# Patient Record
Sex: FEMALE | Race: WHITE | NOT HISPANIC OR LATINO | Employment: PART TIME | URBAN - NONMETROPOLITAN AREA
[De-identification: names, ages, dates, MRNs, and addresses within clinical notes are randomized per-mention and may not be internally consistent; named-entity substitution may affect disease eponyms.]

---

## 2018-10-25 ENCOUNTER — OFFICE VISIT (OUTPATIENT)
Dept: ENDOCRINOLOGY | Facility: CLINIC | Age: 48
End: 2018-10-25

## 2018-10-25 VITALS
WEIGHT: 265.1 LBS | BODY MASS INDEX: 41.61 KG/M2 | SYSTOLIC BLOOD PRESSURE: 124 MMHG | OXYGEN SATURATION: 97 % | DIASTOLIC BLOOD PRESSURE: 78 MMHG | HEIGHT: 67 IN | HEART RATE: 86 BPM

## 2018-10-25 DIAGNOSIS — E11.649 TYPE 2 DIABETES MELLITUS WITH HYPOGLYCEMIA WITHOUT COMA, WITH LONG-TERM CURRENT USE OF INSULIN (HCC): Primary | ICD-10-CM

## 2018-10-25 DIAGNOSIS — Z79.4 TYPE 2 DIABETES MELLITUS WITH HYPOGLYCEMIA WITHOUT COMA, WITH LONG-TERM CURRENT USE OF INSULIN (HCC): Primary | ICD-10-CM

## 2018-10-25 PROBLEM — E11.65 TYPE 2 DIABETES MELLITUS WITH HYPERGLYCEMIA, WITH LONG-TERM CURRENT USE OF INSULIN: Status: ACTIVE | Noted: 2018-10-25

## 2018-10-25 PROCEDURE — 99204 OFFICE O/P NEW MOD 45 MIN: CPT | Performed by: INTERNAL MEDICINE

## 2018-10-25 RX ORDER — FLASH GLUCOSE SENSOR
1 KIT MISCELLANEOUS AS NEEDED
Qty: 2 EACH | Refills: 11 | Status: SHIPPED | OUTPATIENT
Start: 2018-10-25 | End: 2018-10-29 | Stop reason: SDUPTHER

## 2018-10-25 RX ORDER — OXYCODONE AND ACETAMINOPHEN 10; 325 MG/1; MG/1
1 TABLET ORAL EVERY 8 HOURS PRN
Status: ON HOLD | COMMUNITY
End: 2022-08-20 | Stop reason: SDUPTHER

## 2018-10-25 RX ORDER — ALPRAZOLAM 1 MG/1
1 TABLET ORAL 2 TIMES DAILY PRN
COMMUNITY

## 2018-10-25 RX ORDER — FLASH GLUCOSE SENSOR
1 KIT MISCELLANEOUS ONCE
Qty: 1 DEVICE | Refills: 1 | Status: SHIPPED | OUTPATIENT
Start: 2018-10-25 | End: 2018-10-25

## 2018-10-25 RX ORDER — OMEPRAZOLE 40 MG/1
40 CAPSULE, DELAYED RELEASE ORAL DAILY
COMMUNITY

## 2018-10-25 RX ORDER — POLYETHYLENE GLYCOL 3350 17 G/17G
17 POWDER, FOR SOLUTION ORAL DAILY
COMMUNITY
End: 2021-09-01

## 2018-10-25 RX ORDER — BUMETANIDE 1 MG/1
1 TABLET ORAL DAILY PRN
COMMUNITY

## 2018-10-26 ENCOUNTER — TELEPHONE (OUTPATIENT)
Dept: ENDOCRINOLOGY | Facility: CLINIC | Age: 48
End: 2018-10-26

## 2018-10-26 NOTE — TELEPHONE ENCOUNTER
Lucila Julio Key: [id number] - Rx #: [id number] Need help? Call us at [phone]   Outcome   Additional Information Required   This medication is excluded from the patient's benefit. For more information, please reach out to [facility] directly at [phone]. Message from [facility]: Drug is not covered by plan   DrugToujeo Max SoloStar 300UNIT/ML SC SOPN   [employer] Electronic PA Form   Original Claim [id number] Prior Auth In Progress - Check Fax

## 2018-10-29 RX ORDER — FLASH GLUCOSE SENSOR
1 KIT MISCELLANEOUS AS NEEDED
Qty: 2 EACH | Refills: 11 | Status: SHIPPED | OUTPATIENT
Start: 2018-10-29 | End: 2018-11-06

## 2018-11-02 ENCOUNTER — TELEPHONE (OUTPATIENT)
Dept: ENDOCRINOLOGY | Facility: CLINIC | Age: 48
End: 2018-11-02

## 2018-11-02 NOTE — TELEPHONE ENCOUNTER
Pt states that you told her that if she developed a UTI that you would send in an antibiotic if you would send that to [facility] in [place].    Also, she said that you had ordered her a continuous monitor for her BS.  The pharmacy sent it back because the code on it was wrong and they have never received it back.  Could you fix that for her as well?  Thank you.

## 2018-11-02 NOTE — TELEPHONE ENCOUNTER
Pt states that you told her that if she developed a UTI that you would send in an antibiotic if you would send that to [facility] in [place].     Also, she said that you had ordered her a continuous monitor for her BS.  The pharmacy sent it back because the code on it was wrong and they have never received it back.  Could you fix that for her as well?  Thank you.    Above is message from front.    I have not seen anything about the meter.yet  Thanks, Estrella

## 2018-11-05 ENCOUNTER — TELEPHONE (OUTPATIENT)
Dept: ENDOCRINOLOGY | Facility: CLINIC | Age: 48
End: 2018-11-05

## 2018-11-05 RX ORDER — FLUCONAZOLE 150 MG/1
TABLET ORAL
Qty: 2 TABLET | Refills: 6 | Status: SHIPPED | OUTPATIENT
Start: 2018-11-05 | End: 2019-07-10

## 2018-11-05 RX ORDER — NITROFURANTOIN 25; 75 MG/1; MG/1
100 CAPSULE ORAL 2 TIMES DAILY
Qty: 20 CAPSULE | Refills: 0 | Status: SHIPPED | OUTPATIENT
Start: 2018-11-05 | End: 2018-12-07 | Stop reason: SDUPTHER

## 2018-11-06 ENCOUNTER — TELEPHONE (OUTPATIENT)
Dept: ENDOCRINOLOGY | Facility: CLINIC | Age: 48
End: 2018-11-06

## 2018-11-06 RX ORDER — FLASH GLUCOSE SENSOR
1 KIT MISCELLANEOUS
Qty: 2 EACH | Refills: 11 | Status: SHIPPED | OUTPATIENT
Start: 2018-11-06 | End: 2020-10-04

## 2018-11-06 RX ORDER — FLASH GLUCOSE SCANNING READER
1 EACH MISCELLANEOUS DAILY
Qty: 1 DEVICE | Refills: 1 | Status: SHIPPED | OUTPATIENT
Start: 2018-11-06 | End: 2020-10-04

## 2018-11-06 NOTE — TELEPHONE ENCOUNTER
SEFERINO Amaya left voicemail and said they did not get a diagnosis when they received a script for a Freestyle Taiwo. Please call Jose Luis GENTILE [phone]

## 2018-11-06 NOTE — TELEPHONE ENCOUNTER
SEFERINO Amaya left voicemail and said they did not get a diagnosis when they received a script for a Freestyle Taiwo. Please call Jose Luis GENTILE [phone]

## 2018-11-07 ENCOUNTER — TELEPHONE (OUTPATIENT)
Dept: ENDOCRINOLOGY | Facility: CLINIC | Age: 48
End: 2018-11-07

## 2018-11-16 ENCOUNTER — TELEPHONE (OUTPATIENT)
Dept: ENDOCRINOLOGY | Facility: CLINIC | Age: 48
End: 2018-11-16

## 2018-12-07 ENCOUNTER — OFFICE VISIT (OUTPATIENT)
Dept: ENDOCRINOLOGY | Facility: CLINIC | Age: 48
End: 2018-12-07

## 2018-12-07 VITALS
HEIGHT: 67 IN | BODY MASS INDEX: 40.97 KG/M2 | HEART RATE: 88 BPM | WEIGHT: 261 LBS | SYSTOLIC BLOOD PRESSURE: 142 MMHG | DIASTOLIC BLOOD PRESSURE: 80 MMHG

## 2018-12-07 DIAGNOSIS — I10 ESSENTIAL HYPERTENSION: ICD-10-CM

## 2018-12-07 DIAGNOSIS — Z79.4 TYPE 2 DIABETES MELLITUS WITH HYPERGLYCEMIA, WITH LONG-TERM CURRENT USE OF INSULIN (HCC): Primary | ICD-10-CM

## 2018-12-07 DIAGNOSIS — E66.01 CLASS 3 SEVERE OBESITY WITH BODY MASS INDEX (BMI) OF 40.0 TO 44.9 IN ADULT, UNSPECIFIED OBESITY TYPE, UNSPECIFIED WHETHER SERIOUS COMORBIDITY PRESENT (HCC): ICD-10-CM

## 2018-12-07 DIAGNOSIS — E11.65 TYPE 2 DIABETES MELLITUS WITH HYPERGLYCEMIA, WITH LONG-TERM CURRENT USE OF INSULIN (HCC): Primary | ICD-10-CM

## 2018-12-07 PROBLEM — E66.813 CLASS 3 SEVERE OBESITY WITH BODY MASS INDEX (BMI) OF 40.0 TO 44.9 IN ADULT: Status: ACTIVE | Noted: 2018-12-07

## 2018-12-07 PROCEDURE — 99214 OFFICE O/P EST MOD 30 MIN: CPT | Performed by: NURSE PRACTITIONER

## 2018-12-07 RX ORDER — NITROFURANTOIN 25; 75 MG/1; MG/1
100 CAPSULE ORAL 2 TIMES DAILY
Qty: 20 CAPSULE | Refills: 0 | Status: SHIPPED | OUTPATIENT
Start: 2018-12-07 | End: 2020-06-25

## 2018-12-07 RX ORDER — FLUCONAZOLE 150 MG/1
150 TABLET ORAL ONCE
Qty: 1 TABLET | Refills: 2 | Status: SHIPPED | OUTPATIENT
Start: 2018-12-07 | End: 2018-12-07

## 2018-12-07 NOTE — PROGRESS NOTES
Subjective    Lucila Kim is a 48 y.o. female. she is here today for follow-up.    History of Present Illness          Primary Care Provider     Tricia Stokes,      Duration since 2010      Timing - Diabetes is Constant     Quality -  needs improvement     Severity -  severe     Complications - retinopathy     Current symptoms/problems  none      Alleviating Factors: Compliance    July 2018 , had Lap Band Surgery      Aggravating Factors :  None      Side Effects  none     Current diet  Admits to high carb intake      Current exercise walking     Current monitoring regimen: home blood tests - checking 4 x daily       Home blood sugar records:     60 up to 179      Hypoglycemia unawareness, nocturnal and if skipped meals           The following portions of the patient's history were reviewed and updated as appropriate:   Past Medical History:   Diagnosis Date   • Type 2 diabetes mellitus with hyperglycemia, with long-term current use of insulin (CMS/[facility]) 10/25/2018     History reviewed. No pertinent surgical history.  Family History   Problem Relation Age of Onset   • Diabetes Mother      OB History     No data available        Current Outpatient Medications   Medication Sig Dispense Refill   • ALPRAZolam (XANAX) 1 MG tablet Take 1 mg by mouth 2 (Two) Times a Day As Needed for Anxiety.     • bumetanide (BUMEX) 1 MG tablet Take 1 mg by mouth Daily.     • Continuous Blood Gluc  (FREESTYLE AARON 14 DAY READER) device 1 each Daily. Use as indicated, Dx is E11.9 1 Device 1   • Continuous Blood Gluc Sensor (FREESTYLE AARON 14 DAY SENSOR) misc 1 each Every 14 (Fourteen) Days. Dx is E11.9 2 each 11   • Dulaglutide 0.75 MG/0.5ML solution pen-injector Inject 0.75 mg under the skin into the appropriate area as directed 1 (One) Time Per Week. 4 pen 11   • Empagliflozin-Metformin HCl ER (SYNJARDY XR) 5-1000 MG tablet sustained-release 24 hour Take 2 tablet/day by mouth Daily With Breakfast. 180 tablet  11   • fluconazole (DIFLUCAN) 150 MG tablet Take 1 tablet day of infection and 1 tablet 3 days later 2 tablet 6   • fluconazole (DIFLUCAN) 150 MG tablet Take 1 tablet by mouth 1 (One) Time for 1 dose. 1 tablet 2   • Insulin Glargine (TOUJEO MAX SOLOSTAR) 300 UNIT/ML solution pen-injector Inject 100 Units under the skin into the appropriate area as directed Daily. Start with 20 units qhs but may titrate up to 100 units qhs 15 pen 11   • Insulin Regular Human (AFREZZA) 4 & 8 & 12 units powder Inhale 4-32 Units 3 (Three) Times a Day With Meals. Max dose 96 units per day 360 each 11   • nitrofurantoin, macrocrystal-monohydrate, (MACROBID) 100 MG capsule Take 1 capsule by mouth 2 (Two) Times a Day. 20 capsule 0   • omeprazole (priLOSEC) 40 MG capsule Take 40 mg by mouth Daily.     • oxyCODONE-acetaminophen (PERCOCET)  MG per tablet Take 1 tablet by mouth Every 8 (Eight) Hours As Needed for Moderate Pain .     • polyethylene glycol (MIRALAX) packet Take 17 g by mouth Daily.       No current facility-administered medications for this visit.      Allergies   Allergen Reactions   • Ultram [Tramadol Hcl] Shortness Of Breath     Social History     Socioeconomic History   • Marital status: Unknown     Spouse name: Not on file   • Number of children: Not on file   • Years of education: Not on file   • Highest education level: Not on file   Tobacco Use   • Smoking status: Never Smoker   • Smokeless tobacco: Never Used   Substance and Sexual Activity   • Alcohol use: No   • Drug use: No   • Sexual activity: Defer       Review of Systems  Review of Systems   Constitutional: Negative for activity change, appetite change, diaphoresis and fatigue.   HENT: Negative for facial swelling, sneezing, sore throat, tinnitus, trouble swallowing and voice change.    Eyes: Negative for photophobia, pain, discharge, redness, itching and visual disturbance.   Respiratory: Negative for apnea, cough, choking, chest tightness and shortness of  "breath.    Cardiovascular: Negative for chest pain, palpitations and leg swelling.   Gastrointestinal: Negative for abdominal distention, abdominal pain, constipation, diarrhea, nausea and vomiting.   Endocrine: Negative for cold intolerance, heat intolerance, polydipsia, polyphagia and polyuria.   Genitourinary: Negative for difficulty urinating, dysuria, frequency, hematuria and urgency.   Musculoskeletal: Negative for arthralgias, back pain, gait problem, joint swelling, myalgias, neck pain and neck stiffness.   Skin: Negative for color change, pallor, rash and wound.   Neurological: Negative for dizziness, tremors, weakness, light-headedness, numbness and headaches.   Hematological: Negative for adenopathy. Does not bruise/bleed easily.   Psychiatric/Behavioral: Negative for behavioral problems, confusion and sleep disturbance.        Objective    /80 (BP Location: Left arm, Patient Position: Sitting, Cuff Size: Adult)   Pulse 88   Ht 170.2 cm (67\")   Wt 118 kg (261 lb)   BMI 40.88 kg/m²   Physical Exam   Constitutional: She is oriented to person, place, and time. She appears well-developed and well-nourished. No distress.   HENT:   Head: Normocephalic and atraumatic.   Right Ear: External ear normal.   Left Ear: External ear normal.   Nose: Nose normal.   Eyes: Conjunctivae and EOM are normal. Pupils are equal, round, and reactive to light.   Neck: Normal range of motion. Neck supple. No tracheal deviation present. No thyromegaly present.   Cardiovascular: Normal rate, regular rhythm and normal heart sounds.   No murmur heard.  Pulmonary/Chest: Effort normal and breath sounds normal. No respiratory distress. She has no wheezes.   Abdominal: Soft. Bowel sounds are normal. There is no tenderness. There is no rebound and no guarding.   Musculoskeletal: Normal range of motion. She exhibits no edema, tenderness or deformity.   Neurological: She is alert and oriented to person, place, and time. No cranial " nerve deficit.   Skin: Skin is warm and dry. No rash noted.   Psychiatric: She has a normal mood and affect. Her behavior is normal. Judgment and thought content normal.       Lab Review  No results found for: GLUCOSE, NA, K, CL, CO2, BUN, CREATININE, HGBA1C, TRIG, LDL    Assessment/Plan      1. Type 2 diabetes mellitus with hyperglycemia, with long-term current use of insulin (CMS/[facility])    2. Essential hypertension    3. Class 3 severe obesity with body mass index (BMI) of 40.0 to 44.9 in adult, unspecified obesity type, unspecified whether serious comorbidity present (CMS/[facility])    .    Medications prescribed:  Outpatient Encounter Medications as of 12/7/2018   Medication Sig Dispense Refill   • ALPRAZolam (XANAX) 1 MG tablet Take 1 mg by mouth 2 (Two) Times a Day As Needed for Anxiety.     • bumetanide (BUMEX) 1 MG tablet Take 1 mg by mouth Daily.     • Continuous Blood Gluc  (FREESTYLE AARON 14 DAY READER) device 1 each Daily. Use as indicated, Dx is E11.9 1 Device 1   • Continuous Blood Gluc Sensor (FREESTYLE AARON 14 DAY SENSOR) misc 1 each Every 14 (Fourteen) Days. Dx is E11.9 2 each 11   • Dulaglutide 0.75 MG/0.5ML solution pen-injector Inject 0.75 mg under the skin into the appropriate area as directed 1 (One) Time Per Week. 4 pen 11   • Empagliflozin-Metformin HCl ER (SYNJARDY XR) 5-1000 MG tablet sustained-release 24 hour Take 2 tablet/day by mouth Daily With Breakfast. 180 tablet 11   • fluconazole (DIFLUCAN) 150 MG tablet Take 1 tablet day of infection and 1 tablet 3 days later 2 tablet 6   • fluconazole (DIFLUCAN) 150 MG tablet Take 1 tablet by mouth 1 (One) Time for 1 dose. 1 tablet 2   • Insulin Glargine (TOUJEO MAX SOLOSTAR) 300 UNIT/ML solution pen-injector Inject 100 Units under the skin into the appropriate area as directed Daily. Start with 20 units qhs but may titrate up to 100 units qhs 15 pen 11   • Insulin Regular Human (AFREZZA) 4 & 8 & 12 units powder Inhale 4-32 Units 3 (Three) Times  a Day With Meals. Max dose 96 units per day 360 each 11   • nitrofurantoin, macrocrystal-monohydrate, (MACROBID) 100 MG capsule Take 1 capsule by mouth 2 (Two) Times a Day. 20 capsule 0   • omeprazole (priLOSEC) 40 MG capsule Take 40 mg by mouth Daily.     • oxyCODONE-acetaminophen (PERCOCET)  MG per tablet Take 1 tablet by mouth Every 8 (Eight) Hours As Needed for Moderate Pain .     • polyethylene glycol (MIRALAX) packet Take 17 g by mouth Daily.     • [DISCONTINUED] nitrofurantoin, macrocrystal-monohydrate, (MACROBID) 100 MG capsule Take 1 capsule by mouth 2 (Two) Times a Day. 20 capsule 0     No facility-administered encounter medications on file as of 12/7/2018.        Orders placed during this encounter include:  No orders of the defined types were placed in this encounter.    Patient has type 2 diabetes with hyperglycemia and hypoglycemia     Glycemic Management:     On mixed insulin 70/30 --- 80 bid --stopped       Toujeo 75 units once daily --- taking 100 units --- decrease to 90 units      To assess adequacy your sugar should hold steady ( within 40 points ) when not eating     Example , you go to bed 130 -- you wake up 90 to 170      If you drop more than 40 points while not eating - back off by 5 units  If you rise by 40 points while not eating for 3 days - increase by 3 units        ===========================================        Stop metformin       synjardy xr 5/1000, 2 tabs w bkfast--- stop due to chronic yeast infection     Go back to the plain metformin               ===========================================     Trulicity 0.75 mg weekly      If nausea try to eat less     If vomiting, please stop      6% chance of vomiting         ===========================================          Afrezza      Inhale 4 units for every 15 grams of carbohydrate that you eat     Ideally eat up to 45 to 50 grams of carbs per meal     The goal is to not rise more than 50 points , 2hours post meals     If the  2 hour reading is more than 180 --- you can inhale another 4 units of afrezza to bring it down         Since the premeal goal is 80 to 130 this is the ideal scenario     Before the meal 130, eat 45 grams - inhale 12 units, 2 hours post you are 180 , before the next meal down to 130 --------overnight toujeo keeps at 130 -- you wake up 130         Patient checks blood glucose levels 4 times daily and has been for the last 90 days         Approve for [employer] Personal Use        #1  Patient has diabetes mellitus, insulin-dependent.     #2 She performs blood glucose testing 4 times daily and blood glucose log was brought to office with variability from .     #3  She is requiring  Basal insulin  and Prandial Insulin 3 times daily for a total of 4 injections per day.     #4 Based on blood glucose readings we are making adjustments.      #5 I have personally seen patient within the past 6 months     #6 We plan on seeing her every 2-3 months for continuous adjustment of her diabetes regimen      #7 patient has hypoglycemia with unawareness.     #8 patient has day-to-day variation in her mealtime which confounds the degree of insulin dosing with multiple daily injections.     #9 patient has completed diabetes education program with us.     #10 she has demonstrated the ability to self monitor her glucose.         #11 Patient is motivated in improving  diabetes control               Lipid Management       No statin   Blood Pressure Management:         No bp meds           Microvascular Complication Monitoring:       Eye Exam Evaluation, within 1 year     -----------     Last Microalbumin-Proteinuria Assessment     No results found for: MALBCRERATIO     No results found for: UTPCR     -----------        Neuropathy, none                Weight Related:       Patient's Body mass index is 40.88 kg/m². BMI is above normal parameters. Recommendations include: educational material.          Diet interventions: moderate (500 kCal/d)  deficit diet.        Bone Health     No results found for: PTH, CALCIUM, CAION, PHOS, [id number]     Thyroid Health     No results found for: TSH                 Other Diabetes Related Aspects         No results found for: [id number]              4. Follow-up: Return in about 3 months (around 3/7/2019) for Recheck.

## 2019-03-13 ENCOUNTER — OFFICE VISIT (OUTPATIENT)
Dept: ENDOCRINOLOGY | Facility: CLINIC | Age: 49
End: 2019-03-13

## 2019-03-13 VITALS
SYSTOLIC BLOOD PRESSURE: 126 MMHG | BODY MASS INDEX: 43.16 KG/M2 | DIASTOLIC BLOOD PRESSURE: 80 MMHG | HEIGHT: 67 IN | HEART RATE: 96 BPM | WEIGHT: 275 LBS

## 2019-03-13 DIAGNOSIS — E11.65 TYPE 2 DIABETES MELLITUS WITH HYPERGLYCEMIA, WITH LONG-TERM CURRENT USE OF INSULIN (HCC): Primary | ICD-10-CM

## 2019-03-13 DIAGNOSIS — E27.5 HYPERADRENALISM (HCC): ICD-10-CM

## 2019-03-13 DIAGNOSIS — Z79.4 TYPE 2 DIABETES MELLITUS WITH HYPERGLYCEMIA, WITH LONG-TERM CURRENT USE OF INSULIN (HCC): Primary | ICD-10-CM

## 2019-03-13 DIAGNOSIS — M79.10 MYALGIA, UNSPECIFIED SITE: ICD-10-CM

## 2019-03-13 LAB — HBA1C MFR BLD: 8.3 %

## 2019-03-13 PROCEDURE — 99214 OFFICE O/P EST MOD 30 MIN: CPT | Performed by: NURSE PRACTITIONER

## 2019-03-13 RX ORDER — DEXAMETHASONE 1 MG
1 TABLET ORAL NIGHTLY
Qty: 1 TABLET | Refills: 0 | Status: SHIPPED | OUTPATIENT
Start: 2019-03-13 | End: 2019-07-10

## 2019-03-13 NOTE — PROGRESS NOTES
Subjective    Dulcemadi Kim is a 48 y.o. female. she is here today for follow-up.    History of Present Illness        Primary Care Provider     Tricia Stokes,      Duration since 2010      Timing - Diabetes is Constant     Quality -  needs improvement     Severity -  severe     Complications - retinopathy     Current symptoms/problems  none      Alleviating Factors: Compliance       July 2018 , had Lap Band Surgery      Aggravating Factors :  None      Side Effects  none     Current diet  Admits to high carb intake      Current exercise walking     Current monitoring regimen: home blood tests - checking 4 x daily     Lab Results   Component Value Date    HGBA1C 8.3 02/25/2019             Home blood sugar records:     [employer] downloaded and reviewed     Average 218          No lows      Hypoglycemia unawareness, nocturnal and if skipped meals               The following portions of the patient's history were reviewed and updated as appropriate:   Past Medical History:   Diagnosis Date   • Type 2 diabetes mellitus with hyperglycemia, with long-term current use of insulin (CMS/[facility]) 10/25/2018     History reviewed. No pertinent surgical history.  Family History   Problem Relation Age of Onset   • Diabetes Mother      OB History     No data available        Current Outpatient Medications   Medication Sig Dispense Refill   • ALPRAZolam (XANAX) 1 MG tablet Take 1 mg by mouth 2 (Two) Times a Day As Needed for Anxiety.     • bumetanide (BUMEX) 1 MG tablet Take 1 mg by mouth Daily.     • Continuous Blood Gluc  (FREESTYLE AARON 14 DAY READER) device 1 each Daily. Use as indicated, Dx is E11.9 1 Device 1   • Continuous Blood Gluc Sensor (FREESTYLE AARON 14 DAY SENSOR) misc 1 each Every 14 (Fourteen) Days. Dx is E11.9 2 each 11   • dexamethasone (DECADRON) 1 MG tablet Take 1 tablet by mouth Every Night. Take 1  Tablet the night before blood work 1 tablet 0   • Dulaglutide 0.75 MG/0.5ML solution  pen-injector Inject 0.75 mg under the skin into the appropriate area as directed 1 (One) Time Per Week. 4 pen 11   • Dulaglutide 1.5 MG/0.5ML solution pen-injector Inject 1.5 mg under the skin into the appropriate area as directed 1 (One) Time Per Week. 4 pen 11   • Empagliflozin-Metformin HCl ER (SYNJARDY XR) 5-1000 MG tablet sustained-release 24 hour Take 2 tablet/day by mouth Daily With Breakfast. 180 tablet 11   • fluconazole (DIFLUCAN) 150 MG tablet Take 1 tablet day of infection and 1 tablet 3 days later 2 tablet 6   • Insulin Glargine (TOUJEO MAX SOLOSTAR) 300 UNIT/ML solution pen-injector Inject 100 Units under the skin into the appropriate area as directed Daily. Start with 20 units qhs but may titrate up to 100 units qhs 15 pen 11   • Insulin Regular Human (AFREZZA) 4 & 8 & 12 units powder Inhale 4-32 Units 3 (Three) Times a Day With Meals. Max dose 96 units per day 360 each 11   • nitrofurantoin, macrocrystal-monohydrate, (MACROBID) 100 MG capsule Take 1 capsule by mouth 2 (Two) Times a Day. 20 capsule 0   • omeprazole (priLOSEC) 40 MG capsule Take 40 mg by mouth Daily.     • oxyCODONE-acetaminophen (PERCOCET)  MG per tablet Take 1 tablet by mouth Every 8 (Eight) Hours As Needed for Moderate Pain .     • polyethylene glycol (MIRALAX) packet Take 17 g by mouth Daily.       No current facility-administered medications for this visit.      Allergies   Allergen Reactions   • Ultram [Tramadol Hcl] Shortness Of Breath     Social History     Socioeconomic History   • Marital status: Unknown     Spouse name: Not on file   • Number of children: Not on file   • Years of education: Not on file   • Highest education level: Not on file   Tobacco Use   • Smoking status: Never Smoker   • Smokeless tobacco: Never Used   Substance and Sexual Activity   • Alcohol use: No   • Drug use: No   • Sexual activity: Defer       Review of Systems  Review of Systems   Constitutional: Negative for activity change, appetite  "change, diaphoresis and fatigue.   HENT: Negative for facial swelling, sneezing, sore throat, tinnitus, trouble swallowing and voice change.    Eyes: Negative for photophobia, pain, discharge, redness, itching and visual disturbance.   Respiratory: Negative for apnea, cough, choking, chest tightness and shortness of breath.    Cardiovascular: Negative for chest pain, palpitations and leg swelling.   Gastrointestinal: Negative for abdominal distention, abdominal pain, constipation, diarrhea, nausea and vomiting.   Endocrine: Negative for cold intolerance, heat intolerance, polydipsia, polyphagia and polyuria.   Genitourinary: Negative for difficulty urinating, dysuria, frequency, hematuria and urgency.   Musculoskeletal: Negative for arthralgias, back pain, gait problem, joint swelling, myalgias, neck pain and neck stiffness.   Skin: Negative for color change, pallor, rash and wound.   Neurological: Negative for dizziness, tremors, seizures, speech difficulty, weakness, light-headedness, numbness and headaches.   Hematological: Negative for adenopathy. Does not bruise/bleed easily.   Psychiatric/Behavioral: Negative for behavioral problems, confusion and sleep disturbance.        Objective    /80 (BP Location: Right arm, Patient Position: Sitting, Cuff Size: Adult)   Pulse 96   Ht 170.2 cm (67\")   Wt 125 kg (275 lb)   BMI 43.07 kg/m²   Physical Exam   Constitutional: She is oriented to person, place, and time. She appears well-developed and well-nourished. No distress.   HENT:   Head: Normocephalic and atraumatic.   Right Ear: External ear normal.   Left Ear: External ear normal.   Nose: Nose normal.   Eyes: Conjunctivae and EOM are normal. Pupils are equal, round, and reactive to light.   Neck: Normal range of motion. Neck supple. No tracheal deviation present. No thyromegaly present.   Cardiovascular: Normal rate, regular rhythm and normal heart sounds.   No murmur heard.  Pulmonary/Chest: Effort normal " and breath sounds normal. No respiratory distress. She has no wheezes.   Abdominal: Soft. Bowel sounds are normal. There is no tenderness. There is no rebound and no guarding.   Rounded abdomen   Musculoskeletal: Normal range of motion. She exhibits no edema, tenderness or deformity.   Neurological: She is alert and oriented to person, place, and time. No cranial nerve deficit.   Skin: Skin is warm and dry. No rash noted.   Striae on abdomen   Psychiatric: She has a normal mood and affect. Her behavior is normal. Judgment and thought content normal.       Lab Review  Hemoglobin A1C (no units)   Date Value   02/25/2019 8.3       Assessment/Plan      1. Type 2 diabetes mellitus with hyperglycemia, with long-term current use of insulin (CMS/[facility])    2. Hyperadrenalism (CMS/[facility])    3. Myalgia, unspecified site     .    Medications prescribed:  Outpatient Encounter Medications as of 3/13/2019   Medication Sig Dispense Refill   • ALPRAZolam (XANAX) 1 MG tablet Take 1 mg by mouth 2 (Two) Times a Day As Needed for Anxiety.     • bumetanide (BUMEX) 1 MG tablet Take 1 mg by mouth Daily.     • Continuous Blood Gluc  (FREESTYLE AARON 14 DAY READER) device 1 each Daily. Use as indicated, Dx is E11.9 1 Device 1   • Continuous Blood Gluc Sensor (FREESTYLE AARON 14 DAY SENSOR) misc 1 each Every 14 (Fourteen) Days. Dx is E11.9 2 each 11   • dexamethasone (DECADRON) 1 MG tablet Take 1 tablet by mouth Every Night. Take 1  Tablet the night before blood work 1 tablet 0   • Dulaglutide 0.75 MG/0.5ML solution pen-injector Inject 0.75 mg under the skin into the appropriate area as directed 1 (One) Time Per Week. 4 pen 11   • Dulaglutide 1.5 MG/0.5ML solution pen-injector Inject 1.5 mg under the skin into the appropriate area as directed 1 (One) Time Per Week. 4 pen 11   • Empagliflozin-Metformin HCl ER (SYNJARDY XR) 5-1000 MG tablet sustained-release 24 hour Take 2 tablet/day by mouth Daily With Breakfast. 180 tablet 11   •  fluconazole (DIFLUCAN) 150 MG tablet Take 1 tablet day of infection and 1 tablet 3 days later 2 tablet 6   • Insulin Glargine (TOUJEO MAX SOLOSTAR) 300 UNIT/ML solution pen-injector Inject 100 Units under the skin into the appropriate area as directed Daily. Start with 20 units qhs but may titrate up to 100 units qhs 15 pen 11   • Insulin Regular Human (AFREZZA) 4 & 8 & 12 units powder Inhale 4-32 Units 3 (Three) Times a Day With Meals. Max dose 96 units per day 360 each 11   • nitrofurantoin, macrocrystal-monohydrate, (MACROBID) 100 MG capsule Take 1 capsule by mouth 2 (Two) Times a Day. 20 capsule 0   • omeprazole (priLOSEC) 40 MG capsule Take 40 mg by mouth Daily.     • oxyCODONE-acetaminophen (PERCOCET)  MG per tablet Take 1 tablet by mouth Every 8 (Eight) Hours As Needed for Moderate Pain .     • polyethylene glycol (MIRALAX) packet Take 17 g by mouth Daily.       No facility-administered encounter medications on file as of 3/13/2019.        Orders placed during this encounter include:  Orders Placed This Encounter   Procedures   • Hemoglobin A1c     This order was created through External Result Entry   • Cortisol - AM   • Dexamethasone, Serum     Measure after taking dexamethasone 1 mg at 11 p.m. The night before    • Salivary Cortisol X3, Timed - Saliva, Oral Cavity     Collect at 11 p.m. For 3 consecutive nights   And collect at 8 am for 3 consecutive days         Patient has type 2 diabetes with hyperglycemia and hypoglycemia     Glycemic Management:        Lab Results   Component Value Date    HGBA1C 8.3 02/25/2019          On mixed insulin 70/30 --- 80 bid --stopped       Toujeo 75 units once daily --- taking 100 units --- decrease to 90 units      To assess adequacy your sugar should hold steady ( within 40 points ) when not eating     Example , you go to bed 130 -- you wake up 90 to 170      If you drop more than 40 points while not eating - back off by 5 units  If you rise by 40 points while not  eating for 3 days - increase by 3 units        ===========================================        Stop metformin       synjardy xr 5/1000, 2 tabs w bkfast--- stop due to chronic yeast infection      Go back to the plain metformin               ===========================================     Trulicity 0.75 mg weekly --- increase to 1.5 mg weekly     If nausea try to eat less     If vomiting, please stop      6% chance of vomiting         ===========================================          Afrezza      Inhale 4 units for every 15 grams of carbohydrate that you eat---increase to 6 units per 15 grams of CHO     Ideally eat up to 45 to 50 grams of carbs per meal     The goal is to not rise more than 50 points , 2hours post meals     If the 2 hour reading is more than 180 --- you can inhale another 4 units of afrezza to bring it down         Since the premeal goal is 80 to 130 this is the ideal scenario     Before the meal 130, eat 45 grams - inhale 12 units, 2 hours post you are 180 , before the next meal down to 130 --------overnight toujeo keeps at 130 -- you wake up 130         Patient checks blood glucose levels 4 times daily and has been for the last 90 days                 Lipid Management        No statin   Blood Pressure Management:          No bp meds           Microvascular Complication Monitoring:       Eye Exam Evaluation, within 1 year     -----------     Last Microalbumin-Proteinuria Assessment     No results found for: MALBCRERATIO     No results found for: UTPCR     -----------        Neuropathy, none              Weight Related:         Patient's Body mass index is 43.07 kg/m². BMI is above normal parameters. Recommendations include: educational material.             Diet interventions: moderate (500 kCal/d) deficit diet.    Rule out Cushing's --- weigh gain, rounding facies, lose of muscle tone in legs     Will collect salivary time 3 at 8 am and 11 pm     Dexamethasone suppression         Bone  Health     No results found for: PTH, CALCIUM, CAION, PHOS, [id number]     Thyroid Health     No results found for: TSH                 Other Diabetes Related Aspects         No results found for: [id number]                4. Follow-up: Return in about 6 weeks (around 4/24/2019) for Recheck.

## 2019-04-12 ENCOUNTER — APPOINTMENT (OUTPATIENT)
Dept: LAB | Facility: HOSPITAL | Age: 49
End: 2019-04-12

## 2019-04-12 DIAGNOSIS — E27.5 HYPERADRENALISM (HCC): ICD-10-CM

## 2019-04-12 DIAGNOSIS — E66.01 CLASS 3 SEVERE OBESITY WITH BODY MASS INDEX (BMI) OF 40.0 TO 44.9 IN ADULT, UNSPECIFIED OBESITY TYPE, UNSPECIFIED WHETHER SERIOUS COMORBIDITY PRESENT (HCC): ICD-10-CM

## 2019-04-12 DIAGNOSIS — Z79.4 TYPE 2 DIABETES MELLITUS WITH HYPERGLYCEMIA, WITH LONG-TERM CURRENT USE OF INSULIN (HCC): Primary | ICD-10-CM

## 2019-04-12 DIAGNOSIS — I10 ESSENTIAL HYPERTENSION: ICD-10-CM

## 2019-04-12 DIAGNOSIS — E11.65 TYPE 2 DIABETES MELLITUS WITH HYPERGLYCEMIA, WITH LONG-TERM CURRENT USE OF INSULIN (HCC): Primary | ICD-10-CM

## 2019-04-12 PROCEDURE — G0480 DRUG TEST DEF 1-7 CLASSES: HCPCS | Performed by: NURSE PRACTITIONER

## 2019-04-12 PROCEDURE — 36415 COLL VENOUS BLD VENIPUNCTURE: CPT | Performed by: NURSE PRACTITIONER

## 2019-04-12 PROCEDURE — 82533 TOTAL CORTISOL: CPT | Performed by: NURSE PRACTITIONER

## 2019-04-13 LAB — CORTIS AM PEAK SERPL-MCNC: 0.7 UG/DL (ref 6.2–19.4)

## 2019-04-21 LAB — DEXAMETHASONE SERPL-MCNC: 171 NG/DL

## 2019-04-22 ENCOUNTER — TELEPHONE (OUTPATIENT)
Dept: ENDOCRINOLOGY | Facility: CLINIC | Age: 49
End: 2019-04-22

## 2019-04-22 NOTE — TELEPHONE ENCOUNTER
----- Message from IVETTE Allred sent at 4/22/2019  8:07 AM CDT -----  Please let her know the dexamethasone test had a normal supression so no excess cortisol; the salivary test is not results

## 2019-04-25 ENCOUNTER — APPOINTMENT (OUTPATIENT)
Dept: LAB | Facility: HOSPITAL | Age: 49
End: 2019-04-25

## 2019-04-25 PROCEDURE — 36415 COLL VENOUS BLD VENIPUNCTURE: CPT

## 2019-04-26 ENCOUNTER — OFFICE VISIT (OUTPATIENT)
Dept: ENDOCRINOLOGY | Facility: CLINIC | Age: 49
End: 2019-04-26

## 2019-04-26 VITALS
HEIGHT: 67 IN | BODY MASS INDEX: 42.69 KG/M2 | HEART RATE: 85 BPM | SYSTOLIC BLOOD PRESSURE: 142 MMHG | DIASTOLIC BLOOD PRESSURE: 78 MMHG | WEIGHT: 272 LBS

## 2019-04-26 DIAGNOSIS — I10 ESSENTIAL HYPERTENSION: ICD-10-CM

## 2019-04-26 DIAGNOSIS — E55.9 VITAMIN D DEFICIENCY: ICD-10-CM

## 2019-04-26 DIAGNOSIS — E11.65 TYPE 2 DIABETES MELLITUS WITH HYPERGLYCEMIA, WITH LONG-TERM CURRENT USE OF INSULIN (HCC): Primary | ICD-10-CM

## 2019-04-26 DIAGNOSIS — Z79.4 TYPE 2 DIABETES MELLITUS WITH HYPERGLYCEMIA, WITH LONG-TERM CURRENT USE OF INSULIN (HCC): Primary | ICD-10-CM

## 2019-04-26 PROCEDURE — 99214 OFFICE O/P EST MOD 30 MIN: CPT | Performed by: NURSE PRACTITIONER

## 2019-04-26 NOTE — PROGRESS NOTES
Subjective    Dulcemadi Kim is a 48 y.o. female. she is here today for follow-up.    History of Present Illness        Primary Care Provider     Tricia Stokes,      Duration since 2010      Timing - Diabetes is Constant     Quality -  needs improvement     Severity -  severe     Complications - retinopathy     Current symptoms/problems  none      Alleviating Factors: Compliance        July 2018 , had Lap Band Surgery      Aggravating Factors :  None      Side Effects  none     Current diet  Admits to high carb intake      Current exercise walking     Current monitoring regimen: home blood tests - checking 4 x daily            Lab Results   Component Value Date     HGBA1C 8.3 02/25/2019               Home blood sugar records:      Taiwo home use -- did not bring to office         this am was 104     States most goal        Hypoglycemia unawareness, nocturnal and if skipped meals               The following portions of the patient's history were reviewed and updated as appropriate:   Past Medical History:   Diagnosis Date   • Type 2 diabetes mellitus with hyperglycemia, with long-term current use of insulin (CMS/[facility]) 10/25/2018     History reviewed. No pertinent surgical history.  Family History   Problem Relation Age of Onset   • Diabetes Mother      OB History     No data available        Current Outpatient Medications   Medication Sig Dispense Refill   • ALPRAZolam (XANAX) 1 MG tablet Take 1 mg by mouth 2 (Two) Times a Day As Needed for Anxiety.     • bumetanide (BUMEX) 1 MG tablet Take 1 mg by mouth Daily.     • Continuous Blood Gluc  (FREESTYLE TAIWO 14 DAY READER) device 1 each Daily. Use as indicated, Dx is E11.9 1 Device 1   • Continuous Blood Gluc Sensor (FREESTYLE TAIWO 14 DAY SENSOR) misc 1 each Every 14 (Fourteen) Days. Dx is E11.9 2 each 11   • dexamethasone (DECADRON) 1 MG tablet Take 1 tablet by mouth Every Night. Take 1  Tablet the night before blood work 1 tablet 0   •  Dulaglutide 0.75 MG/0.5ML solution pen-injector Inject 0.75 mg under the skin into the appropriate area as directed 1 (One) Time Per Week. 4 pen 11   • Dulaglutide 1.5 MG/0.5ML solution pen-injector Inject 1.5 mg under the skin into the appropriate area as directed 1 (One) Time Per Week. 4 pen 11   • Empagliflozin-Metformin HCl ER (SYNJARDY XR) 5-1000 MG tablet sustained-release 24 hour Take 2 tablet/day by mouth Daily With Breakfast. 180 tablet 11   • fluconazole (DIFLUCAN) 150 MG tablet Take 1 tablet day of infection and 1 tablet 3 days later 2 tablet 6   • Insulin Glargine (TOUJEO MAX SOLOSTAR) 300 UNIT/ML solution pen-injector Inject 100 Units under the skin into the appropriate area as directed Daily. Start with 20 units qhs but may titrate up to 100 units qhs 15 pen 11   • Insulin Regular Human (AFREZZA) 4 & 8 & 12 units powder Inhale 4-32 Units 3 (Three) Times a Day With Meals. Max dose 96 units per day 360 each 11   • nitrofurantoin, macrocrystal-monohydrate, (MACROBID) 100 MG capsule Take 1 capsule by mouth 2 (Two) Times a Day. 20 capsule 0   • omeprazole (priLOSEC) 40 MG capsule Take 40 mg by mouth Daily.     • oxyCODONE-acetaminophen (PERCOCET)  MG per tablet Take 1 tablet by mouth Every 8 (Eight) Hours As Needed for Moderate Pain .     • polyethylene glycol (MIRALAX) packet Take 17 g by mouth Daily.       No current facility-administered medications for this visit.      Allergies   Allergen Reactions   • Ultram [Tramadol Hcl] Shortness Of Breath     Social History     Socioeconomic History   • Marital status: Single     Spouse name: Not on file   • Number of children: Not on file   • Years of education: Not on file   • Highest education level: Not on file   Tobacco Use   • Smoking status: Never Smoker   • Smokeless tobacco: Never Used   Substance and Sexual Activity   • Alcohol use: No   • Drug use: No   • Sexual activity: Defer       Review of Systems  Review of Systems   Constitutional: Negative  "for activity change, appetite change, diaphoresis and fatigue.   HENT: Negative for facial swelling, sneezing, sore throat, tinnitus, trouble swallowing and voice change.    Eyes: Negative for photophobia, pain, discharge, redness, itching and visual disturbance.   Respiratory: Negative for apnea, cough, choking, chest tightness and shortness of breath.    Cardiovascular: Negative for chest pain, palpitations and leg swelling.   Gastrointestinal: Negative for abdominal distention, abdominal pain, constipation, diarrhea, nausea and vomiting.   Endocrine: Negative for cold intolerance, heat intolerance, polydipsia, polyphagia and polyuria.   Genitourinary: Negative for difficulty urinating, dysuria, frequency, hematuria and urgency.   Musculoskeletal: Negative for arthralgias, back pain, gait problem, joint swelling, myalgias, neck pain and neck stiffness.   Skin: Negative for color change, pallor, rash and wound.   Neurological: Negative for dizziness, tremors, weakness, light-headedness, numbness and headaches.   Hematological: Negative for adenopathy. Does not bruise/bleed easily.   Psychiatric/Behavioral: Negative for behavioral problems, confusion and sleep disturbance.        Objective    /78 (BP Location: Left arm, Patient Position: Sitting, Cuff Size: Adult)   Pulse 85   Ht 170.2 cm (67\")   Wt 123 kg (272 lb)   BMI 42.60 kg/m²   Physical Exam   Constitutional: She is oriented to person, place, and time. She appears well-developed and well-nourished. No distress.   HENT:   Head: Normocephalic and atraumatic.   Right Ear: External ear normal.   Left Ear: External ear normal.   Nose: Nose normal.   Eyes: Conjunctivae and EOM are normal. Pupils are equal, round, and reactive to light.   Neck: Normal range of motion. Neck supple. No tracheal deviation present. No thyromegaly present.   Cardiovascular: Normal rate, regular rhythm and normal heart sounds.   No murmur heard.  Pulmonary/Chest: Effort normal " and breath sounds normal. No respiratory distress. She has no wheezes.   Abdominal: Soft. Bowel sounds are normal. There is no tenderness. There is no rebound and no guarding.   Musculoskeletal: Normal range of motion. She exhibits no edema, tenderness or deformity.   Neurological: She is alert and oriented to person, place, and time. No cranial nerve deficit.   Skin: Skin is warm and dry. No rash noted.   Psychiatric: She has a normal mood and affect. Her behavior is normal. Judgment and thought content normal.       Lab Review  Hemoglobin A1C (no units)   Date Value   02/25/2019 8.3       Assessment/Plan      1. Type 2 diabetes mellitus with hyperglycemia, with long-term current use of insulin (CMS/[facility])    2. Essential hypertension    3. Vitamin D deficiency    .    Medications prescribed:  Outpatient Encounter Medications as of 4/26/2019   Medication Sig Dispense Refill   • ALPRAZolam (XANAX) 1 MG tablet Take 1 mg by mouth 2 (Two) Times a Day As Needed for Anxiety.     • bumetanide (BUMEX) 1 MG tablet Take 1 mg by mouth Daily.     • Continuous Blood Gluc  (FREESTYLE AARON 14 DAY READER) device 1 each Daily. Use as indicated, Dx is E11.9 1 Device 1   • Continuous Blood Gluc Sensor (FREESTYLE AARON 14 DAY SENSOR) misc 1 each Every 14 (Fourteen) Days. Dx is E11.9 2 each 11   • dexamethasone (DECADRON) 1 MG tablet Take 1 tablet by mouth Every Night. Take 1  Tablet the night before blood work 1 tablet 0   • Dulaglutide 0.75 MG/0.5ML solution pen-injector Inject 0.75 mg under the skin into the appropriate area as directed 1 (One) Time Per Week. 4 pen 11   • Dulaglutide 1.5 MG/0.5ML solution pen-injector Inject 1.5 mg under the skin into the appropriate area as directed 1 (One) Time Per Week. 4 pen 11   • Empagliflozin-Metformin HCl ER (SYNJARDY XR) 5-1000 MG tablet sustained-release 24 hour Take 2 tablet/day by mouth Daily With Breakfast. 180 tablet 11   • fluconazole (DIFLUCAN) 150 MG tablet Take 1 tablet day  of infection and 1 tablet 3 days later 2 tablet 6   • Insulin Glargine (TOUJEO MAX SOLOSTAR) 300 UNIT/ML solution pen-injector Inject 100 Units under the skin into the appropriate area as directed Daily. Start with 20 units qhs but may titrate up to 100 units qhs 15 pen 11   • Insulin Regular Human (AFREZZA) 4 & 8 & 12 units powder Inhale 4-32 Units 3 (Three) Times a Day With Meals. Max dose 96 units per day 360 each 11   • nitrofurantoin, macrocrystal-monohydrate, (MACROBID) 100 MG capsule Take 1 capsule by mouth 2 (Two) Times a Day. 20 capsule 0   • omeprazole (priLOSEC) 40 MG capsule Take 40 mg by mouth Daily.     • oxyCODONE-acetaminophen (PERCOCET)  MG per tablet Take 1 tablet by mouth Every 8 (Eight) Hours As Needed for Moderate Pain .     • polyethylene glycol (MIRALAX) packet Take 17 g by mouth Daily.       No facility-administered encounter medications on file as of 4/26/2019.        Orders placed during this encounter include:  Orders Placed This Encounter   Procedures   • Comprehensive Metabolic Panel   • Hemoglobin A1c   • TSH   • Protein / Creatinine Ratio, Urine - Urine, Clean Catch   • Microalbumin / Creatinine Urine Ratio - Urine, Clean Catch   • Vitamin B12   • Vitamin D 25 Hydroxy   • Lipid Panel   • CBC & Differential     Order Specific Question:   Manual Differential     Answer:   No     Patient has type 2 diabetes with hyperglycemia and hypoglycemia     Glycemic Management:               Lab Results   Component Value Date     HGBA1C 8.3 02/25/2019            On mixed insulin 70/30 --- 80 bid --stopped       Toujeo taking 100 units      To assess adequacy your sugar should hold steady ( within 40 points ) when not eating     Example , you go to bed 130 -- you wake up 90 to 170      If you drop more than 40 points while not eating - back off by 5 units  If you rise by 40 points while not eating for 3 days - increase by 3 units        ===========================================        Stop  metformin       synjardy xr 5/1000, 2 tabs w bkfast--- stop due to chronic yeast infection      Go back to the plain metformin --- stopped taking               ===========================================     Trulicity  1.5 mg weekly     If nausea try to eat less     If vomiting, please stop      6% chance of vomiting         ===========================================          Afrezza      Inhale 4 units for every 15 grams of carbohydrate that you eat---increase to 6 units per 15 grams of CHO     Ideally eat up to 45 to 50 grams of carbs per meal     The goal is to not rise more than 50 points , 2hours post meals     If the 2 hour reading is more than 180 --- you can inhale another 4 units of afrezza to bring it down         Since the premeal goal is 80 to 130 this is the ideal scenario     Before the meal 130, eat 45 grams - inhale 12 units, 2 hours post you are 180 , before the next meal down to 130 --------overnight toujeo keeps at 130 -- you wake up 130         Patient checks blood glucose levels 4 times daily and has been for the last 90 days                 Lipid Management        No statin   Blood Pressure Management:          No bp meds           Microvascular Complication Monitoring:       Eye Exam Evaluation, within 1 year     -----------     Last Microalbumin-Proteinuria Assessment     No results found for: MALBCRERATIO     No results found for: UTPCR     -----------        Neuropathy, none              Weight Related:         Patient's Body mass index is 42.6 kg/m². BMI is above normal parameters. Recommendations include: educational material.                Diet interventions: moderate (500 kCal/d) deficit diet.     Rule out Cushing's --- weigh gain, rounding facies, lose of muscle tone in legs --ruled out with dex. Stim test     Will collect salivary time 3 at 8 am and 11 pm --no results      Dexamethasone suppression       Component      Latest Ref Rng & Units 4/12/2019   Cortisol - AM      6.2 - 19.4  ug/dL 0.7 (L)   Dexamethasone, Serum      ng/dL 171       Normal response to the dex. Stimulation test           Bone Health     vitamin d def.     Reassess      Thyroid Health     No results found for: TSH                 Other Diabetes Related Aspects         No results found for: [id number]                 4. Follow-up: Return for Recheck.

## 2019-05-13 ENCOUNTER — OFFICE VISIT (OUTPATIENT)
Dept: PRIMARY CARE CLINIC | Age: 49
End: 2019-05-13
Payer: MEDICARE

## 2019-05-13 VITALS
BODY MASS INDEX: 43.47 KG/M2 | OXYGEN SATURATION: 98 % | DIASTOLIC BLOOD PRESSURE: 80 MMHG | TEMPERATURE: 98.4 F | HEART RATE: 92 BPM | WEIGHT: 277 LBS | HEIGHT: 67 IN | SYSTOLIC BLOOD PRESSURE: 138 MMHG

## 2019-05-13 DIAGNOSIS — E66.01 MORBID OBESITY WITH BMI OF 40.0-44.9, ADULT (HCC): ICD-10-CM

## 2019-05-13 DIAGNOSIS — E11.42 TYPE 2 DIABETES MELLITUS WITH DIABETIC POLYNEUROPATHY, WITH LONG-TERM CURRENT USE OF INSULIN (HCC): ICD-10-CM

## 2019-05-13 DIAGNOSIS — R25.2 MUSCLE CRAMPS: ICD-10-CM

## 2019-05-13 DIAGNOSIS — Z79.4 TYPE 2 DIABETES MELLITUS WITH DIABETIC POLYNEUROPATHY, WITH LONG-TERM CURRENT USE OF INSULIN (HCC): ICD-10-CM

## 2019-05-13 DIAGNOSIS — F41.9 ANXIETY: Primary | ICD-10-CM

## 2019-05-13 DIAGNOSIS — B37.31 YEAST VAGINITIS: ICD-10-CM

## 2019-05-13 PROCEDURE — 99204 OFFICE O/P NEW MOD 45 MIN: CPT | Performed by: PEDIATRICS

## 2019-05-13 RX ORDER — PREGABALIN 75 MG/1
75 CAPSULE ORAL EVERY 12 HOURS
COMMUNITY

## 2019-05-13 RX ORDER — OXYCODONE AND ACETAMINOPHEN 10; 325 MG/1; MG/1
1 TABLET ORAL EVERY 8 HOURS PRN
COMMUNITY

## 2019-05-13 RX ORDER — PHENOL 1.4 %
10 AEROSOL, SPRAY (ML) MUCOUS MEMBRANE NIGHTLY
COMMUNITY

## 2019-05-13 RX ORDER — OMEPRAZOLE 40 MG/1
40 CAPSULE, DELAYED RELEASE ORAL DAILY
COMMUNITY

## 2019-05-13 RX ORDER — FLUCONAZOLE 150 MG/1
150 TABLET ORAL DAILY
Qty: 7 TABLET | Refills: 0 | Status: SHIPPED | OUTPATIENT
Start: 2019-05-13 | End: 2019-05-20

## 2019-05-13 RX ORDER — BUMETANIDE 1 MG/1
1 TABLET ORAL PRN
COMMUNITY

## 2019-05-13 RX ORDER — CITALOPRAM 40 MG/1
40 TABLET ORAL DAILY
Qty: 30 TABLET | Refills: 3 | Status: SHIPPED | OUTPATIENT
Start: 2019-05-13 | End: 2019-06-05 | Stop reason: SDUPTHER

## 2019-05-13 RX ORDER — ALPRAZOLAM 1 MG/1
1 TABLET ORAL NIGHTLY PRN
Qty: 60 TABLET | Refills: 0 | Status: SHIPPED | OUTPATIENT
Start: 2019-05-13 | End: 2019-08-29 | Stop reason: SDUPTHER

## 2019-05-13 RX ORDER — HYDROXYZINE HYDROCHLORIDE 25 MG/1
25 TABLET, FILM COATED ORAL EVERY 8 HOURS PRN
Qty: 60 TABLET | Refills: 3 | Status: SHIPPED | OUTPATIENT
Start: 2019-05-13 | End: 2019-06-12

## 2019-05-13 SDOH — HEALTH STABILITY: MENTAL HEALTH: HOW OFTEN DO YOU HAVE A DRINK CONTAINING ALCOHOL?: NEVER

## 2019-05-13 ASSESSMENT — ENCOUNTER SYMPTOMS
VOMITING: 0
EYE REDNESS: 0
EYE ITCHING: 0
SINUS PRESSURE: 0
RHINORRHEA: 0
EYE DISCHARGE: 0
NAUSEA: 0
WHEEZING: 0
ABDOMINAL PAIN: 0
CONSTIPATION: 0
COUGH: 0
DIARRHEA: 0
VOICE CHANGE: 0
TROUBLE SWALLOWING: 0
ALLERGIC/IMMUNOLOGIC NEGATIVE: 1
PHOTOPHOBIA: 0
EYE PAIN: 0
SORE THROAT: 0
ABDOMINAL DISTENTION: 0
BLOOD IN STOOL: 0
EYES NEGATIVE: 1
SHORTNESS OF BREATH: 0

## 2019-05-13 ASSESSMENT — PATIENT HEALTH QUESTIONNAIRE - PHQ9
SUM OF ALL RESPONSES TO PHQ QUESTIONS 1-9: 0
2. FEELING DOWN, DEPRESSED OR HOPELESS: 0
SUM OF ALL RESPONSES TO PHQ9 QUESTIONS 1 & 2: 0
1. LITTLE INTEREST OR PLEASURE IN DOING THINGS: 0
SUM OF ALL RESPONSES TO PHQ QUESTIONS 1-9: 0

## 2019-05-13 NOTE — PROGRESS NOTES
[facility], [address]  Phone [phone]   Fax [phone]      OFFICE VISIT: 2019    Aliza JARVIS Crescencio-: 1970      HPI  Reason For Visit:  Jael Fragoso is a 51 y.o. Health Maintenance    New Patient (New patient here to establish care. PCP was Dr. Carpio Offer before. She sees Dr. Merced Veloz in [facility] for her DM. ) and Discuss Medications (Wants to discuss anxiety medication- she used to take Xanax but has not had this since March)      Patient presents to establish care. She is complaining of anxiety. She is wanting something to take for her anxiety. In the past she has taken Xanax 1 mg which was helpful for her. She is not on any SSRI medication. She does see pain management and takes oxycodone 10 mg and averages 90 of these on a monthly basis. Active cumulative morphine equivalent score is 45. In addition she also takes Lyrica 75 mg twice daily    She is not sleeping well at all  She states that she does not sleep well due to her anxiety. She has had multiple other medications, but nothing else seems to work for her. She has been   Type II diabetes mellitus  Medications: Toujeo insulin 100 units daily   Trulicity 1.5 mg weekly  Symptoms: Still not very well controlled from a diabetes standpoint  Last hemoglobin A1c = 8.5      GERD:  Medication:   Omeprazole 40 mg daily  Symptoms: Excellently controlled on present medication regimen     height is 5' 7\" (1.702 m) and weight is 277 lb (125.6 kg). Her temporal temperature is 98.4 °F (36.9 °C). Her blood pressure is 138/80 and her pulse is 92. Her oxygen saturation is 98%. Body mass index is 43.38 kg/m². I have reviewed the following with the Ms. Crescencio   Lab Review  No visits with results within 6 Month(s) from this visit. Latest known visit with results is:   No results found for any previous visit. Copies of these are in the chart.     Current Outpatient Medications Medication Sig Dispense Refill    Dulaglutide (TRULICITY) 1.5 IO/8.9PJ SOPN Inject 1.5 mg into the skin once a week       Insulin Glargine (TOUJEO SOLOSTAR SC) Inject 100 mg into the skin daily      bumetanide (BUMEX) 1 MG tablet Take 1 mg by mouth as needed      omeprazole (PRILOSEC) 40 MG delayed release capsule Take 40 mg by mouth daily      Melatonin 10 MG TABS Take 10 mg by mouth nightly      oxyCODONE-acetaminophen (PERCOCET)  MG per tablet Take 1 tablet by mouth every 8 hours as needed for Pain (Pain management- [facility]).  pregabalin (LYRICA) 75 MG capsule Take 75 mg by mouth every 12 hours.  citalopram (CELEXA) 40 MG tablet Take 1 tablet by mouth daily 30 tablet 3    ALPRAZolam (XANAX) 1 MG tablet Take 1 tablet by mouth nightly as needed for Sleep for up to 30 days. 60 tablet 0    hydrOXYzine (ATARAX) 25 MG tablet Take 1 tablet by mouth every 8 hours as needed for Itching 60 tablet 3    fluconazole (DIFLUCAN) 150 MG tablet Take 1 tablet by mouth daily for 7 days 7 tablet 0    Semaglutide (OZEMPIC) 1 MG/DOSE SOPN Inject 1 mg into the skin once a week 3 pen 5    empagliflozin (JARDIANCE) 10 MG tablet Take 1 tablet by mouth daily 30 tablet 5     No current facility-administered medications for this visit.         Allergies: Ultram [tramadol hcl]     Past Medical History:   Diagnosis Date    Anxiety     Type 2 diabetes mellitus without complication ([facility] [address]        Family History   Problem Relation Age of Onset    Diabetes Father     Cancer Father         Lung cancer    Cancer Paternal Grandfather         unsure       Past Surgical History:   Procedure Laterality Date     SECTION  2011    GASTRIC BAND  2017    HYSTERECTOMY, TOTAL ABDOMINAL      Still has tubes    SHOULDER SURGERY Left     rotator cuff repair       Social History     Tobacco Use    Smoking status: Never Smoker    Smokeless tobacco: Never Used   Substance Use Topics    Alcohol use: Never     Frequency: Never        Review of Systems   Constitutional: Positive for fatigue. Negative for appetite change (appetite normal), chills, fever and unexpected weight change. Weight is stable   HENT: Negative for congestion, dental problem, ear pain, hearing loss, postnasal drip, rhinorrhea, sinus pressure, sore throat, tinnitus, trouble swallowing and voice change. No headache, lightheadedness or dizziness   Eyes: Negative. Negative for photophobia, pain, discharge, redness, itching and visual disturbance. No change in vision. No blurred vision. No double vision   Respiratory: Negative for cough, shortness of breath (at rest or on exertion that is new or changes) and wheezing. No history of asthma or pneumonia. No orthopnea. No pain with deep excursions   Cardiovascular: Negative for chest pain (or pressure), palpitations and leg swelling. Gastrointestinal: Negative for abdominal distention, abdominal pain, blood in stool (or melena), constipation, diarrhea, nausea and vomiting. No acid reflux   Endocrine: Negative. Negative for cold intolerance, heat intolerance, polydipsia and polyuria. Genitourinary: Negative. Negative for pelvic pain and urgency. No problems urinating. No problems with incontinence. No irregular menses. Musculoskeletal: Positive for arthralgias. Negative for neck pain (no change in range of motion). Skin: Negative. Negative for rash. Allergic/Immunologic: Negative. Neurological: Negative for dizziness, tremors, syncope, weakness, light-headedness, numbness (or tingling sensation) and headaches. Hematological: Negative for adenopathy. Does not bruise/bleed easily. Psychiatric/Behavioral: Positive for dysphoric mood. Negative for confusion (or difficulty with memory) and sleep disturbance. The patient is nervous/anxious. Physical Exam   Constitutional: She is oriented to person, place, and time.  She appears well-developed and reassess. If needed, we can increase this to 60 mg daily. She does have panic attacks now and this may require higher dose in order to control. ALPRAZolam (XANAX) 1 MG tablet  I did agree to prescribe this as going without it has made her very anxious. I did explain to her that my goal is to get her off of this medication over time. hydrOXYzine (ATARAX) 25 MG tablet  This will be provided to help facilitate anxiety relief without using Xanax and it may also help with sleep    2. Type 2 diabetes mellitus with diabetic polyneuropathy, with long-term current use of insulin (HCC) E11.42 Semaglutide (OZEMPIC) 1 MG/DOSE  This will replace Trulicity. This will provide better glycemic control and even greater weight loss relative to Trulicity. Z79.4 empagliflozin (JARDIANCE) 10 MG tablet  We will utilize this medication to facilitate weight loss as well. This will also facilitate improved glycemic control. We did discuss the potential for yeast vaginitis. We will start with Diflucan 150 mg daily ×3 days and then she will take Diflucan 150 mg weekly thereafter for the 1st month. Once blood sugars are better controlled, we may not have as increased risk of mycotic infections      Comprehensive Metabolic Panel     Hemoglobin A1C   3. Yeast vaginitis B37.3 fluconazole (DIFLUCAN) 150 MG tablet  Use as indicated above    4. Muscle cramps R25.2 Comprehensive Metabolic Panel  She is having some muscle cramps. We did recommend potentially using Gatorade like sports drinks with little or no calories to supplement and potentially prevent muscle cramps. We also discussed that she may not need her diuretic with Jardiance onboard. Orders Placed This Encounter   Procedures    Comprehensive Metabolic Panel    Hemoglobin A1C        Return in about 1 month (around 6/13/2019).

## 2019-06-05 DIAGNOSIS — F41.9 ANXIETY: ICD-10-CM

## 2019-06-05 RX ORDER — CITALOPRAM 40 MG/1
40 TABLET ORAL DAILY
Qty: 90 TABLET | Refills: 1 | Status: SHIPPED | OUTPATIENT
Start: 2019-06-05 | End: 2019-08-29

## 2019-06-05 NOTE — TELEPHONE ENCOUNTER
Received fax from pharmacy requesting refill on pts medication(s). Pt was last seen in office on 5/13/2019  and has a follow up scheduled for 6/13/2019. Will send request to  Dr. Lawyer De La Garza  for patient.      Requested Prescriptions     Pending Prescriptions Disp Refills    citalopram (CELEXA) 40 MG tablet 90 tablet 1     Sig: Take 1 tablet by mouth daily

## 2019-07-08 ENCOUNTER — TELEPHONE (OUTPATIENT)
Dept: ADMINISTRATIVE | Age: 49
End: 2019-07-08

## 2019-07-09 ENCOUNTER — TELEPHONE (OUTPATIENT)
Dept: PRIMARY CARE CLINIC | Age: 49
End: 2019-07-09

## 2019-07-09 DIAGNOSIS — Z79.4 TYPE 2 DIABETES MELLITUS WITH DIABETIC POLYNEUROPATHY, WITH LONG-TERM CURRENT USE OF INSULIN (HCC): ICD-10-CM

## 2019-07-09 DIAGNOSIS — E11.42 TYPE 2 DIABETES MELLITUS WITH DIABETIC POLYNEUROPATHY, WITH LONG-TERM CURRENT USE OF INSULIN (HCC): ICD-10-CM

## 2019-07-09 DIAGNOSIS — R25.2 MUSCLE CRAMPS: ICD-10-CM

## 2019-07-09 LAB
ALBUMIN SERPL-MCNC: 4.1 G/DL (ref 3.5–5.2)
ALP BLD-CCNC: 77 U/L (ref 35–104)
ALT SERPL-CCNC: 23 U/L (ref 5–33)
ANION GAP SERPL CALCULATED.3IONS-SCNC: 15 MMOL/L (ref 7–19)
AST SERPL-CCNC: 20 U/L (ref 5–32)
BILIRUB SERPL-MCNC: 0.4 MG/DL (ref 0.2–1.2)
BUN BLDV-MCNC: 9 MG/DL (ref 6–20)
CALCIUM SERPL-MCNC: 8.7 MG/DL (ref 8.6–10)
CHLORIDE BLD-SCNC: 99 MMOL/L (ref 98–111)
CO2: 27 MMOL/L (ref 22–29)
CREAT SERPL-MCNC: 0.7 MG/DL (ref 0.5–0.9)
GFR NON-AFRICAN AMERICAN: >60
GLUCOSE BLD-MCNC: 216 MG/DL (ref 74–109)
HBA1C MFR BLD: 9.9 % (ref 4–6)
POTASSIUM SERPL-SCNC: 3.7 MMOL/L (ref 3.5–5)
SODIUM BLD-SCNC: 141 MMOL/L (ref 136–145)
TOTAL PROTEIN: 7.4 G/DL (ref 6.6–8.7)

## 2019-07-09 NOTE — TELEPHONE ENCOUNTER
----- Message from [address] [facility],[address], DO sent at 7/9/2019  3:30 PM CDT -----  Hemoglobin A1c is 9.9 which indicates poor blood sugar control on present medication regimen.   We are going to need to adjust her medications to maintain better control blood sugars

## 2019-07-09 NOTE — TELEPHONE ENCOUNTER
Pt aware and voiced understanding. Informed patient of any recommendations from providers. Will call with any further questions. Patient sees dr Yuliana Ash tomorrow.

## 2019-07-09 NOTE — TELEPHONE ENCOUNTER
----- Message from [address] [facility],[address], DO sent at 7/9/2019  2:47 PM CDT -----  Your metabolic profile is normal.  This includes kidney and liver functions as well as electrolytes. Blood sugar was 216 at the time of the lab draw.   We are still waiting on the hemoglobin A1c

## 2019-07-10 ENCOUNTER — OFFICE VISIT (OUTPATIENT)
Dept: ENDOCRINOLOGY | Facility: CLINIC | Age: 49
End: 2019-07-10

## 2019-07-10 VITALS
BODY MASS INDEX: 43.7 KG/M2 | WEIGHT: 278.4 LBS | OXYGEN SATURATION: 97 % | HEIGHT: 67 IN | HEART RATE: 84 BPM | DIASTOLIC BLOOD PRESSURE: 76 MMHG | SYSTOLIC BLOOD PRESSURE: 136 MMHG

## 2019-07-10 DIAGNOSIS — E55.9 VITAMIN D DEFICIENCY: ICD-10-CM

## 2019-07-10 DIAGNOSIS — Z79.4 TYPE 2 DIABETES MELLITUS WITH HYPERGLYCEMIA, WITH LONG-TERM CURRENT USE OF INSULIN (HCC): Primary | ICD-10-CM

## 2019-07-10 DIAGNOSIS — E66.01 CLASS 3 SEVERE OBESITY WITH BODY MASS INDEX (BMI) OF 40.0 TO 44.9 IN ADULT, UNSPECIFIED OBESITY TYPE, UNSPECIFIED WHETHER SERIOUS COMORBIDITY PRESENT (HCC): ICD-10-CM

## 2019-07-10 DIAGNOSIS — E27.5 HYPERADRENALISM (HCC): ICD-10-CM

## 2019-07-10 DIAGNOSIS — I10 ESSENTIAL HYPERTENSION: ICD-10-CM

## 2019-07-10 DIAGNOSIS — E11.65 TYPE 2 DIABETES MELLITUS WITH HYPERGLYCEMIA, WITH LONG-TERM CURRENT USE OF INSULIN (HCC): Primary | ICD-10-CM

## 2019-07-10 PROCEDURE — 99214 OFFICE O/P EST MOD 30 MIN: CPT | Performed by: INTERNAL MEDICINE

## 2019-07-10 RX ORDER — FLUCONAZOLE 150 MG/1
TABLET ORAL
Qty: 4 TABLET | Refills: 11 | Status: SHIPPED | OUTPATIENT
Start: 2019-07-10 | End: 2020-10-04 | Stop reason: SDUPTHER

## 2019-07-10 NOTE — PROGRESS NOTES
Subjective    Dulcemadi Kim is a 49 y.o. female. she is here today for follow-up.    Diabetes   Pertinent negatives for hypoglycemia include no confusion, dizziness, headaches, pallor or tremors. Pertinent negatives for diabetes include no chest pain, no fatigue, no polydipsia, no polyphagia, no polyuria and no weakness.           Primary Care Provider     Tricia Stokes, DO     Duration since 2010      Timing - Diabetes is Constant     Quality -  needs improvement     Severity -  severe     Complications - retinopathy     Current symptoms/problems  none      Alleviating Factors: Compliance        July 2018 , had Lap Band Surgery      Aggravating Factors :  None      Side Effects  none     Current diet  Admits to high carb intake      Current exercise walking     Current monitoring regimen: home blood tests - checking 4 x daily      Lab Results   Component Value Date    HGBA1C 9.9 (H) 07/09/2019          Home blood sugar records:      Taiwo home use -- did not bring to office         this am was 104     States most goal        Hypoglycemia unawareness, nocturnal and if skipped meals               The following portions of the patient's history were reviewed and updated as appropriate:   Past Medical History:   Diagnosis Date   • Type 2 diabetes mellitus with hyperglycemia, with long-term current use of insulin (CMS/[facility]) 10/25/2018     No past surgical history on file.  Family History   Problem Relation Age of Onset   • Diabetes Mother      OB History     No data available        Current Outpatient Medications   Medication Sig Dispense Refill   • ALPRAZolam (XANAX) 1 MG tablet Take 1 mg by mouth 2 (Two) Times a Day As Needed for Anxiety.     • bumetanide (BUMEX) 1 MG tablet Take 1 mg by mouth Daily.     • Continuous Blood Gluc  (FREESTYLE TAIWO 14 DAY READER) device 1 each Daily. Use as indicated, Dx is E11.9 1 Device 1   • Continuous Blood Gluc Sensor (FREESTYLE TAIWO 14 DAY SENSOR) misc 1  each Every 14 (Fourteen) Days. Dx is E11.9 2 each 11   • Dulaglutide 1.5 MG/0.5ML solution pen-injector Inject 1.5 mg under the skin into the appropriate area as directed 1 (One) Time Per Week. 4 pen 11   • fluconazole (DIFLUCAN) 150 MG tablet Take 1 tablet day of infection and 1 tablet 3 days later 2 tablet 6   • Insulin Glargine (TOUJEO MAX SOLOSTAR) 300 UNIT/ML solution pen-injector Inject 100 Units under the skin into the appropriate area as directed Daily. Start with 20 units qhs but may titrate up to 100 units qhs 15 pen 11   • nitrofurantoin, macrocrystal-monohydrate, (MACROBID) 100 MG capsule Take 1 capsule by mouth 2 (Two) Times a Day. 20 capsule 0   • omeprazole (priLOSEC) 40 MG capsule Take 40 mg by mouth Daily.     • oxyCODONE-acetaminophen (PERCOCET)  MG per tablet Take 1 tablet by mouth Every 8 (Eight) Hours As Needed for Moderate Pain .     • polyethylene glycol (MIRALAX) packet Take 17 g by mouth Daily.     • dexamethasone (DECADRON) 1 MG tablet Take 1 tablet by mouth Every Night. Take 1  Tablet the night before blood work 1 tablet 0   • Insulin Regular Human (AFREZZA) 4 & 8 & 12 units powder Inhale 4-32 Units 3 (Three) Times a Day With Meals. Max dose 96 units per day 360 each 11     No current facility-administered medications for this visit.      Allergies   Allergen Reactions   • Ultram [Tramadol Hcl] Shortness Of Breath     Social History     Socioeconomic History   • Marital status: Single     Spouse name: Not on file   • Number of children: Not on file   • Years of education: Not on file   • Highest education level: Not on file   Tobacco Use   • Smoking status: Never Smoker   • Smokeless tobacco: Never Used   Substance and Sexual Activity   • Alcohol use: No   • Drug use: No   • Sexual activity: Defer       Review of Systems  Review of Systems   Constitutional: Negative for activity change, appetite change, diaphoresis and fatigue.   HENT: Negative for facial swelling, sneezing, sore  "throat, tinnitus, trouble swallowing and voice change.    Eyes: Negative for photophobia, pain, discharge, redness, itching and visual disturbance.   Respiratory: Negative for apnea, cough, choking, chest tightness and shortness of breath.    Cardiovascular: Negative for chest pain, palpitations and leg swelling.   Gastrointestinal: Negative for abdominal distention, abdominal pain, constipation, diarrhea, nausea and vomiting.   Endocrine: Negative for cold intolerance, heat intolerance, polydipsia, polyphagia and polyuria.   Genitourinary: Negative for difficulty urinating, dysuria, frequency, hematuria and urgency.   Musculoskeletal: Negative for arthralgias, back pain, gait problem, joint swelling, myalgias, neck pain and neck stiffness.   Skin: Negative for color change, pallor, rash and wound.   Neurological: Negative for dizziness, tremors, weakness, light-headedness, numbness and headaches.   Hematological: Negative for adenopathy. Does not bruise/bleed easily.   Psychiatric/Behavioral: Negative for behavioral problems, confusion and sleep disturbance.        Objective    /76   Pulse 84   Ht 170.2 cm (67\")   Wt 126 kg (278 lb 6.4 oz)   SpO2 97%   BMI 43.60 kg/m²   Physical Exam   Constitutional: She is oriented to person, place, and time. She appears well-developed and well-nourished. No distress.   HENT:   Head: Normocephalic and atraumatic.   Right Ear: External ear normal.   Left Ear: External ear normal.   Nose: Nose normal.   Eyes: Conjunctivae and EOM are normal. Pupils are equal, round, and reactive to light.   Neck: Normal range of motion. Neck supple. No tracheal deviation present. No thyromegaly present.   Cardiovascular: Normal rate, regular rhythm and normal heart sounds.   No murmur heard.  Pulmonary/Chest: Effort normal and breath sounds normal. No respiratory distress. She has no wheezes.   Abdominal: Soft. Bowel sounds are normal. There is no tenderness. There is no rebound and no " guarding.   Musculoskeletal: Normal range of motion. She exhibits no edema, tenderness or deformity.   Neurological: She is alert and oriented to person, place, and time. No cranial nerve deficit.   Skin: Skin is warm and dry. No rash noted.   Psychiatric: She has a normal mood and affect. Her behavior is normal. Judgment and thought content normal.       Lab Review  Glucose (mg/dL)   Date Value   07/09/2019 216 (H)     Sodium (mmol/L)   Date Value   07/09/2019 141     Potassium (mmol/L)   Date Value   07/09/2019 3.7     Chloride (mmol/L)   Date Value   07/09/2019 99     CO2 (mmol/L)   Date Value   07/09/2019 27     BUN (mg/dL)   Date Value   07/09/2019 9     Creatinine (mg/dL)   Date Value   07/09/2019 0.7     Hemoglobin A1C   Date Value   07/09/2019 9.9 % (H)   02/25/2019 8.3       Assessment/Plan      1. Type 2 diabetes mellitus with hyperglycemia, with long-term current use of insulin (CMS/[facility])    2. Essential hypertension    3. Vitamin D deficiency    4. Hyperadrenalism (CMS/[facility])    5. Class 3 severe obesity with body mass index (BMI) of 40.0 to 44.9 in adult, unspecified obesity type, unspecified whether serious comorbidity present (CMS/[facility])    .    Medications prescribed:  Outpatient Encounter Medications as of 7/10/2019   Medication Sig Dispense Refill   • ALPRAZolam (XANAX) 1 MG tablet Take 1 mg by mouth 2 (Two) Times a Day As Needed for Anxiety.     • bumetanide (BUMEX) 1 MG tablet Take 1 mg by mouth Daily.     • Continuous Blood Gluc  (FREESTYLE AARON 14 DAY READER) device 1 each Daily. Use as indicated, Dx is E11.9 1 Device 1   • Continuous Blood Gluc Sensor (FREESTYLE AARON 14 DAY SENSOR) misc 1 each Every 14 (Fourteen) Days. Dx is E11.9 2 each 11   • Dulaglutide 1.5 MG/0.5ML solution pen-injector Inject 1.5 mg under the skin into the appropriate area as directed 1 (One) Time Per Week. 4 pen 11   • fluconazole (DIFLUCAN) 150 MG tablet Take 1 tablet day of infection and 1 tablet 3 days later 2  tablet 6   • Insulin Glargine (TOUJEO MAX SOLOSTAR) 300 UNIT/ML solution pen-injector Inject 100 Units under the skin into the appropriate area as directed Daily. Start with 20 units qhs but may titrate up to 100 units qhs 15 pen 11   • nitrofurantoin, macrocrystal-monohydrate, (MACROBID) 100 MG capsule Take 1 capsule by mouth 2 (Two) Times a Day. 20 capsule 0   • omeprazole (priLOSEC) 40 MG capsule Take 40 mg by mouth Daily.     • oxyCODONE-acetaminophen (PERCOCET)  MG per tablet Take 1 tablet by mouth Every 8 (Eight) Hours As Needed for Moderate Pain .     • polyethylene glycol (MIRALAX) packet Take 17 g by mouth Daily.     • dexamethasone (DECADRON) 1 MG tablet Take 1 tablet by mouth Every Night. Take 1  Tablet the night before blood work 1 tablet 0   • Insulin Regular Human (AFREZZA) 4 & 8 & 12 units powder Inhale 4-32 Units 3 (Three) Times a Day With Meals. Max dose 96 units per day 360 each 11   • [DISCONTINUED] Dulaglutide 0.75 MG/0.5ML solution pen-injector Inject 0.75 mg under the skin into the appropriate area as directed 1 (One) Time Per Week. 4 pen 11   • [DISCONTINUED] Empagliflozin-Metformin HCl ER (SYNJARDY XR) 5-1000 MG tablet sustained-release 24 hour Take 2 tablet/day by mouth Daily With Breakfast. 180 tablet 11     No facility-administered encounter medications on file as of 7/10/2019.        Orders placed during this encounter include:  No orders of the defined types were placed in this encounter.    Patient has type 2 diabetes with hyperglycemia and hypoglycemia     Glycemic Management:               Lab Results   Component Value Date     HGBA1C 8.3 02/25/2019              Toujeo taking 100 units           ===========================================            synjardy xr 5/1000, 2 tabs w bkfast--- stop due to chronic yeast infection             ===========================================     Trulicity  1.5 mg weekly       ===========================================          Afrezza          Up to 32 units with meals needed because of lipohypertrophy     Has been using         Lipid Management      No results found for: CHOL, CHLPL, TRIG, HDL, LDL, LDLDIRECT    No statin       Blood Pressure Management:          No bp meds           Microvascular Complication Monitoring:       Eye Exam Evaluation, within 1 year     -----------     Last Microalbumin-Proteinuria Assessment     No results found for: MALBCRERATIO     No results found for: UTPCR     -----------        Neuropathy, none              Weight Related:         Patient's Body mass index is 43.6 kg/m². BMI is above normal parameters. Recommendations include: educational material.                Diet interventions: moderate (500 kCal/d) deficit diet.     Rule out Cushing's --- weigh gain, rounding facies, lose of muscle tone in legs --ruled out with dex. Stim test     Will collect salivary time 3 at 8 am and 11 pm --no results      Dexamethasone suppression       Component      Latest Ref Rng & Units 4/12/2019   Cortisol - AM      6.2 - 19.4 ug/dL 0.7 (L)   Dexamethasone, Serum      ng/dL 171       Normal response to the dex. Stimulation test           Bone Health     vitamin d def.     Reassess      Thyroid Health     No results found for: TSH                 Other Diabetes Related Aspects         No results found for: [id number]                 4. Follow-up: No Follow-up on file.

## 2019-08-29 ENCOUNTER — OFFICE VISIT (OUTPATIENT)
Dept: PRIMARY CARE CLINIC | Age: 49
End: 2019-08-29
Payer: MEDICARE

## 2019-08-29 VITALS
HEIGHT: 67 IN | SYSTOLIC BLOOD PRESSURE: 126 MMHG | DIASTOLIC BLOOD PRESSURE: 80 MMHG | TEMPERATURE: 98 F | HEART RATE: 86 BPM | BODY MASS INDEX: 41.75 KG/M2 | OXYGEN SATURATION: 98 % | WEIGHT: 266 LBS

## 2019-08-29 DIAGNOSIS — F41.9 ANXIETY: ICD-10-CM

## 2019-08-29 DIAGNOSIS — E11.42 TYPE 2 DIABETES MELLITUS WITH DIABETIC POLYNEUROPATHY, WITHOUT LONG-TERM CURRENT USE OF INSULIN (HCC): Primary | ICD-10-CM

## 2019-08-29 DIAGNOSIS — G89.4 CHRONIC PAIN SYNDROME: ICD-10-CM

## 2019-08-29 DIAGNOSIS — M62.08 DIASTASIS RECTI: ICD-10-CM

## 2019-08-29 DIAGNOSIS — G47.10 HYPERSOMNIA: ICD-10-CM

## 2019-08-29 PROCEDURE — 1036F TOBACCO NON-USER: CPT | Performed by: PEDIATRICS

## 2019-08-29 PROCEDURE — 3046F HEMOGLOBIN A1C LEVEL >9.0%: CPT | Performed by: PEDIATRICS

## 2019-08-29 PROCEDURE — 2022F DILAT RTA XM EVC RTNOPTHY: CPT | Performed by: PEDIATRICS

## 2019-08-29 PROCEDURE — G8427 DOCREV CUR MEDS BY ELIG CLIN: HCPCS | Performed by: PEDIATRICS

## 2019-08-29 PROCEDURE — 3017F COLORECTAL CA SCREEN DOC REV: CPT | Performed by: PEDIATRICS

## 2019-08-29 PROCEDURE — G8417 CALC BMI ABV UP PARAM F/U: HCPCS | Performed by: PEDIATRICS

## 2019-08-29 PROCEDURE — 99214 OFFICE O/P EST MOD 30 MIN: CPT | Performed by: PEDIATRICS

## 2019-08-29 RX ORDER — ALPRAZOLAM 1 MG/1
1 TABLET ORAL NIGHTLY PRN
Qty: 60 TABLET | Refills: 0 | Status: CANCELLED | OUTPATIENT
Start: 2019-08-29 | End: 2019-09-28

## 2019-08-29 RX ORDER — ESCITALOPRAM OXALATE 20 MG/1
20 TABLET ORAL DAILY
Qty: 30 TABLET | Refills: 3 | Status: SHIPPED | OUTPATIENT
Start: 2019-08-29 | End: 2019-09-23 | Stop reason: ALTCHOICE

## 2019-08-29 RX ORDER — ALPRAZOLAM 1 MG/1
1 TABLET ORAL NIGHTLY PRN
Qty: 60 TABLET | Refills: 0 | Status: SHIPPED | OUTPATIENT
Start: 2019-08-29 | End: 2019-09-28

## 2019-08-29 ASSESSMENT — ENCOUNTER SYMPTOMS
ALLERGIC/IMMUNOLOGIC NEGATIVE: 1
EYE PAIN: 0
ABDOMINAL PAIN: 0
EYE ITCHING: 0
SHORTNESS OF BREATH: 0
BLOOD IN STOOL: 0
ABDOMINAL DISTENTION: 0
EYE REDNESS: 0
VOICE CHANGE: 0
SINUS PRESSURE: 0
RHINORRHEA: 0
EYES NEGATIVE: 1
PHOTOPHOBIA: 0
SORE THROAT: 0
TROUBLE SWALLOWING: 0
WHEEZING: 0
DIARRHEA: 0
EYE DISCHARGE: 0
CONSTIPATION: 0
NAUSEA: 0
VOMITING: 0
COUGH: 0

## 2019-09-23 ENCOUNTER — OFFICE VISIT (OUTPATIENT)
Dept: PRIMARY CARE CLINIC | Age: 49
End: 2019-09-23
Payer: MEDICARE

## 2019-09-23 VITALS
HEIGHT: 67 IN | HEART RATE: 85 BPM | OXYGEN SATURATION: 97 % | WEIGHT: 266 LBS | DIASTOLIC BLOOD PRESSURE: 86 MMHG | SYSTOLIC BLOOD PRESSURE: 136 MMHG | BODY MASS INDEX: 41.75 KG/M2 | TEMPERATURE: 97.6 F

## 2019-09-23 DIAGNOSIS — G47.10 HYPERSOMNIA: ICD-10-CM

## 2019-09-23 DIAGNOSIS — F41.9 ANXIETY: Primary | ICD-10-CM

## 2019-09-23 PROCEDURE — 99213 OFFICE O/P EST LOW 20 MIN: CPT | Performed by: PEDIATRICS

## 2019-09-23 PROCEDURE — G8427 DOCREV CUR MEDS BY ELIG CLIN: HCPCS | Performed by: PEDIATRICS

## 2019-09-23 PROCEDURE — G8417 CALC BMI ABV UP PARAM F/U: HCPCS | Performed by: PEDIATRICS

## 2019-09-23 PROCEDURE — 3017F COLORECTAL CA SCREEN DOC REV: CPT | Performed by: PEDIATRICS

## 2019-09-23 PROCEDURE — 1036F TOBACCO NON-USER: CPT | Performed by: PEDIATRICS

## 2019-09-23 RX ORDER — CLONAZEPAM 1 MG/1
1 TABLET ORAL NIGHTLY PRN
Qty: 30 TABLET | Refills: 0 | Status: SHIPPED | OUTPATIENT
Start: 2019-09-23 | End: 2019-11-04 | Stop reason: ALTCHOICE

## 2019-09-23 RX ORDER — FLUOXETINE HYDROCHLORIDE 40 MG/1
40 CAPSULE ORAL DAILY
Qty: 30 CAPSULE | Refills: 3 | Status: SHIPPED | OUTPATIENT
Start: 2019-09-23 | End: 2019-10-25 | Stop reason: SDUPTHER

## 2019-09-23 ASSESSMENT — ENCOUNTER SYMPTOMS
EYES NEGATIVE: 1
ALLERGIC/IMMUNOLOGIC NEGATIVE: 1
GASTROINTESTINAL NEGATIVE: 1
RESPIRATORY NEGATIVE: 1

## 2019-09-23 NOTE — PROGRESS NOTES
ABDOMINAL      Still has tubes    SHOULDER SURGERY Left 1999    rotator cuff repair       Social History     Tobacco Use    Smoking status: Never Smoker    Smokeless tobacco: Never Used   Substance Use Topics    Alcohol use: Never     Frequency: Never        Review of Systems   Constitutional: Positive for fatigue. HENT: Negative. Eyes: Negative. Respiratory: Negative. Cardiovascular: Negative. Gastrointestinal: Negative. Endocrine: Negative. Genitourinary: Negative. Musculoskeletal: Positive for arthralgias. Skin: Negative. Allergic/Immunologic: Negative. Neurological: Negative. Hematological: Negative. Psychiatric/Behavioral: Positive for sleep disturbance (she feels tired all the time. ). The patient is nervous/anxious (with panic attacks). Physical Exam   Constitutional: She is oriented to person, place, and time. She appears well-developed and well-nourished. No distress. HENT:   Head: Normocephalic and atraumatic. Right Ear: External ear normal.   Left Ear: External ear normal.   Nose: Nose normal.   Mouth/Throat: Oropharynx is clear and moist.   Eyes: Conjunctivae are normal. Right eye exhibits no discharge. Left eye exhibits no discharge. Neck: Normal range of motion. Neck supple. No JVD present. No thyromegaly present. Cardiovascular: Normal rate, regular rhythm and normal heart sounds. Exam reveals no gallop and no friction rub. No murmur heard. Pulmonary/Chest: Effort normal and breath sounds normal. No respiratory distress. She has no wheezes. She has no rales. Abdominal: Soft. Bowel sounds are normal. She exhibits no mass. There is no tenderness. There is no rebound and no guarding. Abdominal obesity with diastases recti   Musculoskeletal: Normal range of motion. She exhibits no edema or tenderness. Lymphadenopathy:     She has no cervical adenopathy. Neurological: She is alert and oriented to person, place, and time.  She exhibits normal muscle tone. Skin: Skin is warm and dry. No rash noted. Nursing note and vitals reviewed. ASSESSMENT      ICD-10-CM    1. Anxiety F41.9 FLUoxetine (PROZAC) 40 MG capsule     clonazePAM (KLONOPIN) 1 MG tablet   2. Hypersomnia G47.10        PLAN      ICD-10-CM    1. Anxiety F41.9 FLUoxetine (PROZAC) 40 MG capsule     clonazePAM (KLONOPIN) 1 MG tablet   2. Hypersomnia G47.10 There may be a component of michelle, but she does not feel so. Declined sleep study for now. Will follow        No orders of the defined types were placed in this encounter. No follow-ups on file.

## 2019-09-23 NOTE — PATIENT INSTRUCTIONS
Imagine yourself in any setting that helps you feel calm. You can use audiotapes, books, or a teacher to guide you. How to relax your body  · Do something active. Exercise or activity can help reduce stress. Walking is a great way to get started. Even everyday activities such as housecleaning or yard work can help. · Do breathing exercises. For example:  ? From a standing position, bend forward from the waist with your knees slightly bent. Let your arms dangle close to the floor. ? Breathe in slowly and deeply as you return to a standing position. Roll up slowly and lift your head last.  ? Hold your breath for just a few seconds in the standing position. ? Breathe out slowly and bend forward from the waist.  · Try yoga or chad chi. These techniques combine exercise and meditation. You may need some training at first to learn them. What can you do to prevent stress? · Manage your time. This helps you find time to do the things you want and need to do. · Get enough sleep. Your body recovers from the stresses of the day while you are sleeping. · Get support. Your family, friends, and community can make a difference in how you experience stress. Where can you learn more? Go to https://Akellapepiceweb.Nistica. org and sign in to your [employer] account. Enter O757 in the [employer] box to learn more about \"Learning About Stress. \"     If you do not have an account, please click on the \"Sign Up Now\" link. Current as of: June 28, 2018  Content Version: 12.1  © 0652-8801 Healthwise, Incorporated. Care instructions adapted under license by [facility] ([facility]). If you have questions about a medical condition or this instruction, always ask your healthcare professional. [address] any warranty or liability for your use of this information. Patient Education        Learning About Guided Imagery for Stress  What are guided imagery and stress?     Stress is what you feel when you If you can, close your eyes. Put your arms on the armrests, or fold your hands in your lap. · Take a deep breath through your nose. Breathe in slowly, and then let the air out completely through your mouth. · Do it again slowly. Keep breathing like this. Gather up any tension in your body, and send it out with every breath. · Let a feeling of warmth spread from your lungs to your neck and head, down your arms to your fingertips, through your body and into your legs, all the way down to your toes. Stay this way for a moment. · Now imagine a pleasant day. You're at a park or at a place you've visited in the past where you felt at peace. · In your mind's eye, look at what lies in front of you. Maybe you see the sun, filtered through trees. Maybe clouds are drifting by. · Look to one side, and then the other. Notice the feel of the air around you. Notice how it feels on your face and on your arms. · Stay here for a while. Let it become real for you. Follow-up care is a key part of your treatment and safety. Be sure to make and go to all appointments, and call your doctor if you are having problems. It's also a good idea to know your test results and keep a list of the medicines you take. Where can you learn more? Go to https://Nereus [email or web address] org and sign in to your [employer] account. Enter N291 in the [employer] box to learn more about \"Learning About Guided Imagery for Stress. \"     If you do not have an account, please click on the \"Sign Up Now\" link. Current as of: June 28, 2018  Content Version: 12.1  © 6891-7609 Healthwise, [employer]. Care instructions adapted under license by [facility] ([facility]). If you have questions about a medical condition or this instruction, always ask your healthcare professional. [address] any warranty or liability for your use of this information.          Patient Education        Learning About Mindfulness for Stress  What are mindfulness and stress? Stress is what you feel when you have to handle more than you are used to. A lot of things can cause stress. You may feel stress when you go on a job interview, take a test, or run a race. This kind of short-term stress is normal and even useful. It can help you if you need to work hard or react quickly. Stress also can last a long time. Long-term stress is caused by stressful situations or events. Examples of long-term stress include long-term health problems, ongoing problems at work, and conflicts in your family. Long-term stress can harm your health. Mindfulness is a focus only on things happening in the present moment. It's a process of purposefully paying attention to and being aware of your surroundings, your emotions, your thoughts, and how your body feels. You are aware of these things, but you aren't judging these experiences as \"good\" or \"bad. \" Mindfulness can help you learn to calm your mind and body to help you cope with illness, pain, and stress. How does mindfulness help to relieve stress? Mindfulness can help quiet your mind and relax your body. Studies show that it can help some people sleep better, feel less anxious, and bring their blood pressure down. And it's been shown to help some people live and cope better with certain health problems like heart disease, depression, chronic pain, and cancer. How do you practice mindfulness? To be mindful is to pay attention, to be present, and to be accepting. · When you're mindful, you do just one thing and you pay close attention to that one thing. For example, you may sit quietly and notice your emotions or how your food tastes and smells. · When you're present, you focus on the things that are happening right now. You let go of your thoughts about the past and the future. When you dwell on the past or the future, you miss moments that can heal and strengthen you.  You may miss moments like hearing a child laugh or seeing a friendly face when you think you're all alone. · When you're accepting, you don't  the present moment. Instead you accept your thoughts and feelings as they come. You can practice anytime, anywhere, and in any way you choose. You can practice in many ways. Here are a few ideas:  · While doing your chores, like washing the dishes, let your mind focus on what's in your hand. What does the dish feel like? Is the water warm or cold? · Go outside and take a few deep breaths. What is the air like? Is it warm or cold? · When you can, take some time at the start of your day to sit alone and think. · Take a slow walk by yourself. Count your steps while you breathe in and out. · Try yoga breathing exercises, stretches, and poses to strengthen and relax your muscles. · At work, if you can, try to stop for a few moments each hour. Note how your body feels. Let yourself regroup and let your mind settle before you return to what you were doing. · If you struggle with anxiety or \"worry thoughts,\" imagine your mind as a blue rich and your worry thoughts as clouds. Now imagine those worry thoughts floating across your mind's rich. Just let them pass by as you watch. Follow-up care is a key part of your treatment and safety. Be sure to make and go to all appointments, and call your doctor if you are having problems. It's also a good idea to know your test results and keep a list of the medicines you take. Where can you learn more? Go to https://Crediteraaden.Gentis. org and sign in to your [employer] account. Enter A226 in the [employer] box to learn more about \"Learning About Mindfulness for Stress. \"     If you do not have an account, please click on the \"Sign Up Now\" link. Current as of: June 28, 2018  Content Version: 12.1  © 7373-6701 Healthwise, Incorporated. Care instructions adapted under license by [facility] ([facility]).  If you have questions about a medical condition or this instruction, always ask

## 2019-10-25 ENCOUNTER — OFFICE VISIT (OUTPATIENT)
Dept: PRIMARY CARE CLINIC | Age: 49
End: 2019-10-25
Payer: MEDICARE

## 2019-10-25 ENCOUNTER — TELEPHONE (OUTPATIENT)
Dept: ENDOCRINOLOGY | Facility: CLINIC | Age: 49
End: 2019-10-25

## 2019-10-25 VITALS
TEMPERATURE: 96.2 F | OXYGEN SATURATION: 96 % | BODY MASS INDEX: 41.21 KG/M2 | WEIGHT: 262.6 LBS | HEIGHT: 67 IN | HEART RATE: 80 BPM

## 2019-10-25 DIAGNOSIS — F41.9 ANXIETY: ICD-10-CM

## 2019-10-25 DIAGNOSIS — Z12.31 ENCOUNTER FOR SCREENING MAMMOGRAM FOR MALIGNANT NEOPLASM OF BREAST: ICD-10-CM

## 2019-10-25 DIAGNOSIS — F41.0 PANIC ATTACKS: Primary | ICD-10-CM

## 2019-10-25 DIAGNOSIS — Z12.11 ENCOUNTER FOR SCREENING FOR MALIGNANT NEOPLASM OF COLON: ICD-10-CM

## 2019-10-25 DIAGNOSIS — F33.1 MODERATE EPISODE OF RECURRENT MAJOR DEPRESSIVE DISORDER (HCC): ICD-10-CM

## 2019-10-25 DIAGNOSIS — F51.02 ADJUSTMENT INSOMNIA: ICD-10-CM

## 2019-10-25 DIAGNOSIS — Z12.39 ENCOUNTER FOR SCREENING BREAST EXAMINATION: ICD-10-CM

## 2019-10-25 DIAGNOSIS — K44.9 HIATAL HERNIA: ICD-10-CM

## 2019-10-25 PROCEDURE — 1036F TOBACCO NON-USER: CPT | Performed by: PEDIATRICS

## 2019-10-25 PROCEDURE — 3017F COLORECTAL CA SCREEN DOC REV: CPT | Performed by: PEDIATRICS

## 2019-10-25 PROCEDURE — G8427 DOCREV CUR MEDS BY ELIG CLIN: HCPCS | Performed by: PEDIATRICS

## 2019-10-25 PROCEDURE — G8417 CALC BMI ABV UP PARAM F/U: HCPCS | Performed by: PEDIATRICS

## 2019-10-25 PROCEDURE — 99214 OFFICE O/P EST MOD 30 MIN: CPT | Performed by: PEDIATRICS

## 2019-10-25 PROCEDURE — G8484 FLU IMMUNIZE NO ADMIN: HCPCS | Performed by: PEDIATRICS

## 2019-10-25 RX ORDER — FLUOXETINE HYDROCHLORIDE 40 MG/1
80 CAPSULE ORAL DAILY
Qty: 60 CAPSULE | Refills: 5 | Status: SHIPPED | OUTPATIENT
Start: 2019-10-25 | End: 2019-11-05 | Stop reason: SDUPTHER

## 2019-10-25 RX ORDER — LORAZEPAM 1 MG/1
1 TABLET ORAL EVERY 8 HOURS PRN
Qty: 30 TABLET | Refills: 0 | Status: SHIPPED | OUTPATIENT
Start: 2019-10-25 | End: 2019-11-24

## 2019-10-25 ASSESSMENT — ENCOUNTER SYMPTOMS
EYES NEGATIVE: 1
GASTROINTESTINAL NEGATIVE: 1
ALLERGIC/IMMUNOLOGIC NEGATIVE: 1
RESPIRATORY NEGATIVE: 1

## 2019-10-25 NOTE — TELEPHONE ENCOUNTER
LM with patient. Received a call from [employer] [facility] stating they need a letter from Michael confirming that the patient dropped and broke her sohan reader and that is why pt is requesting new reader. Please call office back to let us know.

## 2019-11-04 ENCOUNTER — OFFICE VISIT (OUTPATIENT)
Dept: GASTROENTEROLOGY | Age: 49
End: 2019-11-04
Payer: MEDICARE

## 2019-11-04 VITALS
HEART RATE: 84 BPM | HEIGHT: 67 IN | DIASTOLIC BLOOD PRESSURE: 78 MMHG | OXYGEN SATURATION: 98 % | WEIGHT: 262 LBS | BODY MASS INDEX: 41.12 KG/M2 | SYSTOLIC BLOOD PRESSURE: 120 MMHG

## 2019-11-04 DIAGNOSIS — K92.0 HEMATEMESIS, PRESENCE OF NAUSEA NOT SPECIFIED: ICD-10-CM

## 2019-11-04 DIAGNOSIS — K21.9 GASTROESOPHAGEAL REFLUX DISEASE, ESOPHAGITIS PRESENCE NOT SPECIFIED: ICD-10-CM

## 2019-11-04 DIAGNOSIS — R13.10 DYSPHAGIA, UNSPECIFIED TYPE: ICD-10-CM

## 2019-11-04 DIAGNOSIS — Z12.11 COLON CANCER SCREENING: Primary | ICD-10-CM

## 2019-11-04 PROCEDURE — 99204 OFFICE O/P NEW MOD 45 MIN: CPT | Performed by: NURSE PRACTITIONER

## 2019-11-04 ASSESSMENT — ENCOUNTER SYMPTOMS
BLOOD IN STOOL: 0
RECTAL PAIN: 0
CONSTIPATION: 1
COUGH: 0
NAUSEA: 0
ABDOMINAL PAIN: 0
TROUBLE SWALLOWING: 1
SHORTNESS OF BREATH: 0
ABDOMINAL DISTENTION: 0
DIARRHEA: 0
ANAL BLEEDING: 0
CHOKING: 0
VOMITING: 1

## 2019-11-05 DIAGNOSIS — F41.9 ANXIETY: ICD-10-CM

## 2019-11-05 DIAGNOSIS — F33.1 MODERATE EPISODE OF RECURRENT MAJOR DEPRESSIVE DISORDER (HCC): ICD-10-CM

## 2019-11-05 RX ORDER — FLUOXETINE HYDROCHLORIDE 40 MG/1
80 CAPSULE ORAL DAILY
Qty: 180 CAPSULE | Refills: 1 | Status: SHIPPED | OUTPATIENT
Start: 2019-11-05 | End: 2020-07-15

## 2019-11-07 ENCOUNTER — OFFICE VISIT (OUTPATIENT)
Dept: ENDOCRINOLOGY | Facility: CLINIC | Age: 49
End: 2019-11-07

## 2019-11-07 VITALS
HEIGHT: 67 IN | BODY MASS INDEX: 41.11 KG/M2 | HEART RATE: 79 BPM | WEIGHT: 261.9 LBS | OXYGEN SATURATION: 99 % | DIASTOLIC BLOOD PRESSURE: 80 MMHG | SYSTOLIC BLOOD PRESSURE: 126 MMHG

## 2019-11-07 DIAGNOSIS — E55.9 VITAMIN D DEFICIENCY: ICD-10-CM

## 2019-11-07 DIAGNOSIS — Z79.4 TYPE 2 DIABETES MELLITUS WITH HYPERGLYCEMIA, WITH LONG-TERM CURRENT USE OF INSULIN (HCC): Primary | ICD-10-CM

## 2019-11-07 DIAGNOSIS — E78.5 HYPERLIPIDEMIA, UNSPECIFIED HYPERLIPIDEMIA TYPE: ICD-10-CM

## 2019-11-07 DIAGNOSIS — E11.65 TYPE 2 DIABETES MELLITUS WITH HYPERGLYCEMIA, WITH LONG-TERM CURRENT USE OF INSULIN (HCC): Primary | ICD-10-CM

## 2019-11-07 DIAGNOSIS — I10 ESSENTIAL HYPERTENSION: ICD-10-CM

## 2019-11-07 PROCEDURE — 99214 OFFICE O/P EST MOD 30 MIN: CPT | Performed by: NURSE PRACTITIONER

## 2019-11-07 RX ORDER — AMOXICILLIN 875 MG/1
875 TABLET, COATED ORAL 2 TIMES DAILY
COMMUNITY
End: 2019-11-07 | Stop reason: ALTCHOICE

## 2019-11-07 RX ORDER — LORAZEPAM 1 MG/1
1 TABLET ORAL 2 TIMES DAILY
COMMUNITY
Start: 2019-10-25 | End: 2019-11-24

## 2019-11-07 RX ORDER — FLUOXETINE HYDROCHLORIDE 40 MG/1
80 CAPSULE ORAL DAILY
Refills: 5 | COMMUNITY
Start: 2019-10-25 | End: 2020-09-29

## 2019-11-07 NOTE — PROGRESS NOTES
Subjective    Lucila JESSICA Kim is a 49 y.o. female. she is here today for follow-up.    History of Present Illness       Primary Care Provider     Tricia Stokes,     Reason -- diabetes        Duration since 2010      Timing - Diabetes is Constant    She had a steroid shot on Monday of this week      Quality -  needs improvement     Severity -  severe     Complications - retinopathy     Current symptoms/problems  none      Alleviating Factors: Compliance        July 2018 , had Lap Band Surgery      Aggravating Factors :  None      Side Effects  none     Current diet  Admits to high carb intake      Current exercise walking     Current monitoring regimen: home blood tests - checking 4 x daily       Lab Results   Component Value Date    HGBA1C 9.9 (H) 07/09/2019                Home blood sugar records:      Taiwo home use --she  Lost the taiwo reader        143 yesterday       This am was 209          Hypoglycemia unawareness, nocturnal and if skipped meals            The following portions of the patient's history were reviewed and updated as appropriate:   Past Medical History:   Diagnosis Date   • Type 2 diabetes mellitus with hyperglycemia, with long-term current use of insulin (CMS/[facility]) 10/25/2018     No past surgical history on file.  Family History   Problem Relation Age of Onset   • Diabetes Mother      OB History     No data available        Current Outpatient Medications   Medication Sig Dispense Refill   • ALPRAZolam (XANAX) 1 MG tablet Take 1 mg by mouth 2 (Two) Times a Day As Needed for Anxiety.     • bumetanide (BUMEX) 1 MG tablet Take 1 mg by mouth Daily.     • Continuous Blood Gluc  (FREESTYLE TAIWO 14 DAY READER) device 1 each Daily. Use as indicated, Dx is E11.9 1 Device 1   • Continuous Blood Gluc Sensor (FREESTYLE TAIWO 14 DAY SENSOR) misc 1 each Every 14 (Fourteen) Days. Dx is E11.9 2 each 11   • Dulaglutide 1.5 MG/0.5ML solution pen-injector Inject 1.5 mg under the skin  into the appropriate area as directed 1 (One) Time Per Week. 4 pen 11   • Empagliflozin (JARDIANCE) 10 MG tablet Take 1 tablet by mouth Daily. Before bkfast 30 tablet 11   • fluconazole (DIFLUCAN) 150 MG tablet Take 1 tablet weekly 4 tablet 11   • FLUoxetine (PROzac) 40 MG capsule Take 80 mg by mouth Daily.  5   • Insulin Glargine (TOUJEO MAX SOLOSTAR) 300 UNIT/ML solution pen-injector Inject 100 Units under the skin into the appropriate area as directed Daily. Start with 20 units qhs but may titrate up to 100 units qhs 15 pen 11   • Insulin Regular Human (AFREZZA) 4 & 8 & 12 units powder Inhale 4-32 Units 3 (Three) Times a Day With Meals. Max dose 96 units per day 360 each 11   • LORazepam (ATIVAN) 1 MG tablet Take 1 mg by mouth 2 (Two) Times a Day.     • metFORMIN (GLUCOPHAGE) 1000 MG tablet Take 1 tablet by mouth 2 (Two) Times a Day With Meals. 60 tablet 11   • nitrofurantoin, macrocrystal-monohydrate, (MACROBID) 100 MG capsule Take 1 capsule by mouth 2 (Two) Times a Day. 20 capsule 0   • omeprazole (priLOSEC) 40 MG capsule Take 40 mg by mouth Daily.     • oxyCODONE-acetaminophen (PERCOCET)  MG per tablet Take 1 tablet by mouth Every 8 (Eight) Hours As Needed for Moderate Pain .     • polyethylene glycol (MIRALAX) packet Take 17 g by mouth Daily.       No current facility-administered medications for this visit.      Allergies   Allergen Reactions   • Ultram [Tramadol Hcl] Shortness Of Breath     Social History     Socioeconomic History   • Marital status: Single     Spouse name: Not on file   • Number of children: Not on file   • Years of education: Not on file   • Highest education level: Not on file   Tobacco Use   • Smoking status: Never Smoker   • Smokeless tobacco: Never Used   Substance and Sexual Activity   • Alcohol use: No   • Drug use: No   • Sexual activity: Defer       Review of Systems  Review of Systems   Constitutional: Negative for activity change, appetite change, diaphoresis and fatigue.  "  HENT: Negative for facial swelling, sneezing, sore throat, tinnitus, trouble swallowing and voice change.    Eyes: Negative for photophobia, pain, discharge, redness, itching and visual disturbance.   Respiratory: Negative for apnea, cough, choking, chest tightness and shortness of breath.    Cardiovascular: Negative for chest pain, palpitations and leg swelling.   Gastrointestinal: Negative for abdominal distention, abdominal pain, constipation, diarrhea, nausea and vomiting.   Endocrine: Negative for cold intolerance, heat intolerance, polydipsia, polyphagia and polyuria.   Genitourinary: Negative for difficulty urinating, dysuria, frequency, hematuria and urgency.   Musculoskeletal: Negative for arthralgias, back pain, gait problem, joint swelling, myalgias, neck pain and neck stiffness.   Skin: Negative for color change, pallor, rash and wound.   Neurological: Negative for dizziness, tremors, weakness, light-headedness, numbness and headaches.   Hematological: Negative for adenopathy. Does not bruise/bleed easily.   Psychiatric/Behavioral: Negative for behavioral problems, confusion and sleep disturbance.        Objective    /80   Pulse 79   Ht 170.2 cm (67\")   Wt 119 kg (261 lb 14.4 oz)   SpO2 99%   BMI 41.02 kg/m²   Physical Exam   Constitutional: She is oriented to person, place, and time. She appears well-developed and well-nourished. No distress.   HENT:   Head: Normocephalic and atraumatic.   Right Ear: External ear normal.   Left Ear: External ear normal.   Nose: Nose normal.   Eyes: Conjunctivae and EOM are normal. Pupils are equal, round, and reactive to light.   Neck: Normal range of motion. Neck supple. No tracheal deviation present. No thyromegaly present.   Cardiovascular: Normal rate, regular rhythm and normal heart sounds.   No murmur heard.  Pulmonary/Chest: Effort normal and breath sounds normal. No respiratory distress. She has no wheezes.   Abdominal: Soft. Bowel sounds are " normal. There is no tenderness. There is no rebound and no guarding.   Musculoskeletal: Normal range of motion. She exhibits no edema, tenderness or deformity.   Neurological: She is alert and oriented to person, place, and time. No cranial nerve deficit.   Skin: Skin is warm and dry. No rash noted.   Psychiatric: She has a normal mood and affect. Her behavior is normal. Judgment and thought content normal.       Lab Review  Glucose (mg/dL)   Date Value   07/09/2019 216 (H)     Sodium (mmol/L)   Date Value   07/09/2019 141     Potassium (mmol/L)   Date Value   07/09/2019 3.7     Chloride (mmol/L)   Date Value   07/09/2019 99     CO2 (mmol/L)   Date Value   07/09/2019 27     BUN (mg/dL)   Date Value   07/09/2019 9     Creatinine (mg/dL)   Date Value   07/09/2019 0.7     Hemoglobin A1C   Date Value   07/09/2019 9.9 % (H)   02/25/2019 8.3       Assessment/Plan      1. Type 2 diabetes mellitus with hyperglycemia, with long-term current use of insulin (CMS/[facility])    2. Essential hypertension    3. Vitamin D deficiency    4. Hyperlipidemia, unspecified hyperlipidemia type    .    Medications prescribed:  Outpatient Encounter Medications as of 11/7/2019   Medication Sig Dispense Refill   • ALPRAZolam (XANAX) 1 MG tablet Take 1 mg by mouth 2 (Two) Times a Day As Needed for Anxiety.     • bumetanide (BUMEX) 1 MG tablet Take 1 mg by mouth Daily.     • Continuous Blood Gluc  (FREESTYLE AARON 14 DAY READER) device 1 each Daily. Use as indicated, Dx is E11.9 1 Device 1   • Continuous Blood Gluc Sensor (FREESTYLE AARON 14 DAY SENSOR) misc 1 each Every 14 (Fourteen) Days. Dx is E11.9 2 each 11   • Dulaglutide 1.5 MG/0.5ML solution pen-injector Inject 1.5 mg under the skin into the appropriate area as directed 1 (One) Time Per Week. 4 pen 11   • Empagliflozin (JARDIANCE) 10 MG tablet Take 1 tablet by mouth Daily. Before bkfast 30 tablet 11   • fluconazole (DIFLUCAN) 150 MG tablet Take 1 tablet weekly 4 tablet 11   •  FLUoxetine (PROzac) 40 MG capsule Take 80 mg by mouth Daily.  5   • Insulin Glargine (TOUJEO MAX SOLOSTAR) 300 UNIT/ML solution pen-injector Inject 100 Units under the skin into the appropriate area as directed Daily. Start with 20 units qhs but may titrate up to 100 units qhs 15 pen 11   • Insulin Regular Human (AFREZZA) 4 & 8 & 12 units powder Inhale 4-32 Units 3 (Three) Times a Day With Meals. Max dose 96 units per day 360 each 11   • LORazepam (ATIVAN) 1 MG tablet Take 1 mg by mouth 2 (Two) Times a Day.     • metFORMIN (GLUCOPHAGE) 1000 MG tablet Take 1 tablet by mouth 2 (Two) Times a Day With Meals. 60 tablet 11   • nitrofurantoin, macrocrystal-monohydrate, (MACROBID) 100 MG capsule Take 1 capsule by mouth 2 (Two) Times a Day. 20 capsule 0   • omeprazole (priLOSEC) 40 MG capsule Take 40 mg by mouth Daily.     • oxyCODONE-acetaminophen (PERCOCET)  MG per tablet Take 1 tablet by mouth Every 8 (Eight) Hours As Needed for Moderate Pain .     • polyethylene glycol (MIRALAX) packet Take 17 g by mouth Daily.     • [DISCONTINUED] amoxicillin (AMOXIL) 875 MG tablet Take 875 mg by mouth 2 (Two) Times a Day.       No facility-administered encounter medications on file as of 11/7/2019.        Orders placed during this encounter include:  Orders Placed This Encounter   Procedures   • Comprehensive Metabolic Panel     Standing Status:   Future     Standing Expiration Date:   11/7/2020   • Hemoglobin A1c     Standing Status:   Future     Standing Expiration Date:   11/7/2020   • Lipid Panel     Standing Status:   Future     Standing Expiration Date:   11/7/2020   • Vitamin D 25 Hydroxy     Standing Status:   Future     Standing Expiration Date:   11/7/2020   • Vitamin B12     Standing Status:   Future     Standing Expiration Date:   11/7/2020   • TSH     Standing Status:   Future     Standing Expiration Date:   11/7/2020   • Protein / Creatinine Ratio, Urine - Urine, Clean Catch     Standing Status:   Future      Standing Expiration Date:   11/7/2020   • Microalbumin / Creatinine Urine Ratio - Urine, Clean Catch     Standing Status:   Future     Standing Expiration Date:   11/7/2020   • CBC & Differential     Standing Status:   Future     Standing Expiration Date:   11/7/2020     Order Specific Question:   Manual Differential     Answer:   No     Patient has type 2 diabetes with hyperglycemia and hypoglycemia     Glycemic Management:      Lab Results   Component Value Date    HGBA1C 9.9 (H) 07/09/2019                  Toujeo taking 100 units           ===========================================            synjardy xr 5/1000, 2 tabs w bkfast--- stop due to chronic yeast infection            ===========================================     Trulicity  1.5 mg weekly       ===========================================          Afrezza         Up to 32 units with meals needed because of lipohypertrophy      Not taking like she is supposed           Patient had the taiwo and has lost the reader she has searched and cannot find it       Approve for Taiwo Personal Use        #1  Patient has diabetes mellitus, insulin-dependent.     #2 She performs blood glucose testing 4 times daily and blood glucose log was brought to office with variability from .     #3  She injects Basal insulin  and Prandial Insulin 3 times daily for a total of 4 injections per day.     #4 Based on blood glucose readings we are making adjustments.      #5 I have personally seen patient within the past 6 months     #6 We plan on seeing her every 2-3 months for continuous adjustment of her diabetes regimen      #7 patient has hypoglycemia with unawareness.     #8 patient has day-to-day variation in her mealtime which confounds the degree of insulin dosing with multiple daily injections.     #9 patient has completed diabetes education program with us.     #10 she has demonstrated the ability to self monitor her glucose.         #11 Patient is motivated in  improving  diabetes control             This document has been electronically signed by IVETTE Allred on November 7, 2019 1:49 PM         Lipid Management      No results found for: CHOL, CHLPL, TRIG, HDL, LDL, LDLDIRECT     No statin         Blood Pressure Management:          No bp meds           Microvascular Complication Monitoring:       Eye Exam Evaluation, within 1 year     -----------     Last Microalbumin-Proteinuria Assessment     No results found for: MALBCRERATIO     No results found for: UTPCR     -----------        Neuropathy, none              Weight Related:      Patient's Body mass index is 41.02 kg/m². BMI is above normal parameters. Recommendations include: educational material.     Rule out Cushing's --- weigh gain, rounding facies, lose of muscle tone in legs --ruled out with dex. Stim test     Will collect salivary time 3 at 8 am and 11 pm --no results      Dexamethasone suppression         Component      Latest Ref Rng & Units 4/12/2019   Cortisol - AM      6.2 - 19.4 ug/dL 0.7 (L)   Dexamethasone, Serum      ng/dL 171         Normal response to the dex. Stimulation test            Bone Health     vitamin d def.      Reassess      Thyroid Health     No results found for: TSH                   Other Diabetes Related Aspects         No results found for: [id number]                 4. Follow-up: Return for Recheck.

## 2019-11-15 RX ORDER — INSULIN GLARGINE 300 U/ML
INJECTION, SOLUTION SUBCUTANEOUS
Qty: 30 ML | Refills: 5 | Status: SHIPPED | OUTPATIENT
Start: 2019-11-15 | End: 2020-10-04 | Stop reason: SDUPTHER

## 2020-03-23 ENCOUNTER — OFFICE VISIT (OUTPATIENT)
Dept: ENDOCRINOLOGY | Facility: CLINIC | Age: 50
End: 2020-03-23

## 2020-03-23 VITALS
BODY MASS INDEX: 40.62 KG/M2 | SYSTOLIC BLOOD PRESSURE: 120 MMHG | HEIGHT: 67 IN | HEART RATE: 101 BPM | DIASTOLIC BLOOD PRESSURE: 70 MMHG | WEIGHT: 258.8 LBS

## 2020-03-23 DIAGNOSIS — E78.5 HYPERLIPIDEMIA, UNSPECIFIED HYPERLIPIDEMIA TYPE: ICD-10-CM

## 2020-03-23 DIAGNOSIS — I10 ESSENTIAL HYPERTENSION: ICD-10-CM

## 2020-03-23 DIAGNOSIS — E55.9 VITAMIN D DEFICIENCY: ICD-10-CM

## 2020-03-23 DIAGNOSIS — E27.5 HYPERADRENALISM (HCC): ICD-10-CM

## 2020-03-23 DIAGNOSIS — E11.65 TYPE 2 DIABETES MELLITUS WITH HYPERGLYCEMIA, WITH LONG-TERM CURRENT USE OF INSULIN (HCC): Primary | ICD-10-CM

## 2020-03-23 DIAGNOSIS — Z79.4 TYPE 2 DIABETES MELLITUS WITH HYPERGLYCEMIA, WITH LONG-TERM CURRENT USE OF INSULIN (HCC): Primary | ICD-10-CM

## 2020-03-23 LAB — HBA1C MFR BLD: 7.6 %

## 2020-03-23 PROCEDURE — 99214 OFFICE O/P EST MOD 30 MIN: CPT | Performed by: INTERNAL MEDICINE

## 2020-03-23 RX ORDER — PREGABALIN 75 MG/1
150 CAPSULE ORAL 2 TIMES DAILY
COMMUNITY
Start: 2020-02-23

## 2020-03-23 RX ORDER — LISINOPRIL 10 MG/1
10 TABLET ORAL DAILY
COMMUNITY

## 2020-03-23 NOTE — PROGRESS NOTES
Subjective    Dulcemadi Kim is a 49 y.o. female. she is here today for follow-up.    Diabetes   Pertinent negatives for hypoglycemia include no confusion, dizziness, headaches, pallor or tremors. Pertinent negatives for diabetes include no chest pain, no fatigue, no polydipsia, no polyphagia, no polyuria and no weakness.          Primary Care Provider   Tricia Stokes, DO      Diabetes        Duration since 2010      Timing - Diabetes is Constant    Quality -  needs improvement     Severity -  severe     Complications - retinopathy     Alleviating Factors: Compliance        July 2018 , had Lap Band Surgery      Aggravating Factors :  Gets steroid injections for right shoulder pain        Current diet  Admits to high carb intake      Current exercise walking     Current monitoring regimen: home blood tests - checking 4 x daily       Lab Results   Component Value Date    HGBA1C 7.6 03/13/2020                Home blood sugar records:      [employer]wo home use but didn't bring it     Has kept losing weight                The following portions of the patient's history were reviewed and updated as appropriate:   Past Medical History:   Diagnosis Date   • Type 2 diabetes mellitus with hyperglycemia, with long-term current use of insulin (CMS/[facility]) 10/25/2018     No past surgical history on file.  Family History   Problem Relation Age of Onset   • Diabetes Mother      OB History    None       Current Outpatient Medications   Medication Sig Dispense Refill   • ALPRAZolam (XANAX) 1 MG tablet Take 1 mg by mouth 2 (Two) Times a Day As Needed for Anxiety.     • bumetanide (BUMEX) 1 MG tablet Take 1 mg by mouth Daily.     • Continuous Blood Gluc  (FREESTYLE TAIWO 14 DAY READER) device 1 each Daily. Use as indicated, Dx is E11.9 1 Device 1   • Continuous Blood Gluc Sensor (FREESTYLE TAIWO 14 DAY SENSOR) misc 1 each Every 14 (Fourteen) Days. Dx is E11.9 2 each 11   • Dulaglutide 1.5 MG/0.5ML solution pen-injector  Inject 1.5 mg under the skin into the appropriate area as directed 1 (One) Time Per Week. 4 pen 11   • Empagliflozin (JARDIANCE) 10 MG tablet Take 1 tablet by mouth Daily. Before bkfast 30 tablet 11   • fluconazole (DIFLUCAN) 150 MG tablet Take 1 tablet weekly 4 tablet 11   • FLUoxetine (PROzac) 40 MG capsule Take 80 mg by mouth Daily.  5   • Insulin Regular Human (AFREZZA) 4 & 8 & 12 units powder Inhale 4-32 Units 3 (Three) Times a Day With Meals. Max dose 96 units per day 360 each 11   • lisinopril (PRINIVIL,ZESTRIL) 10 MG tablet Take 10 mg by mouth Daily.     • metFORMIN (GLUCOPHAGE) 1000 MG tablet Take 1 tablet by mouth 2 (Two) Times a Day With Meals. 60 tablet 11   • nitrofurantoin, macrocrystal-monohydrate, (MACROBID) 100 MG capsule Take 1 capsule by mouth 2 (Two) Times a Day. 20 capsule 0   • omeprazole (priLOSEC) 40 MG capsule Take 40 mg by mouth Daily.     • oxyCODONE-acetaminophen (PERCOCET)  MG per tablet Take 1 tablet by mouth Every 8 (Eight) Hours As Needed for Moderate Pain .     • polyethylene glycol (MIRALAX) packet Take 17 g by mouth Daily.     • pregabalin (LYRICA) 75 MG capsule      • TOUJEO MAX SOLOSTAR 300 UNIT/ML solution pen-injector injection INJECT 100 UNITS SUBQ DAILY *BEGIN WITH 20 UNITS AT BED BY MAY TITRATE UP  UNITS AT BED 30 mL 5     No current facility-administered medications for this visit.      Allergies   Allergen Reactions   • Ultram [Tramadol Hcl] Shortness Of Breath     Social History     Socioeconomic History   • Marital status: Single     Spouse name: Not on file   • Number of children: Not on file   • Years of education: Not on file   • Highest education level: Not on file   Tobacco Use   • Smoking status: Never Smoker   • Smokeless tobacco: Never Used   Substance and Sexual Activity   • Alcohol use: No   • Drug use: No   • Sexual activity: Defer       Review of Systems  Review of Systems   Constitutional: Negative for activity change, appetite change,  "diaphoresis and fatigue.   HENT: Negative for facial swelling, sneezing, sore throat, tinnitus, trouble swallowing and voice change.    Eyes: Negative for photophobia, pain, discharge, redness, itching and visual disturbance.   Respiratory: Negative for apnea, cough, choking, chest tightness and shortness of breath.    Cardiovascular: Negative for chest pain, palpitations and leg swelling.   Gastrointestinal: Negative for abdominal distention, abdominal pain, constipation, diarrhea, nausea and vomiting.   Endocrine: Negative for cold intolerance, heat intolerance, polydipsia, polyphagia and polyuria.   Genitourinary: Negative for difficulty urinating, dysuria, frequency, hematuria and urgency.   Musculoskeletal: Positive for arthralgias. Negative for back pain, gait problem, joint swelling, myalgias, neck pain and neck stiffness.        Right shoulder pain    Skin: Negative for color change, pallor, rash and wound.   Neurological: Negative for dizziness, tremors, weakness, light-headedness, numbness and headaches.   Hematological: Negative for adenopathy. Does not bruise/bleed easily.   Psychiatric/Behavioral: Negative for behavioral problems, confusion and sleep disturbance.        Objective    /70   Pulse 101   Ht 170.2 cm (67\")   Wt 117 kg (258 lb 12.8 oz)   BMI 40.53 kg/m²   Physical Exam   Constitutional: She is oriented to person, place, and time. She appears well-developed and well-nourished. No distress.   HENT:   Head: Normocephalic and atraumatic.   Right Ear: External ear normal.   Left Ear: External ear normal.   Nose: Nose normal.   Eyes: Pupils are equal, round, and reactive to light. Conjunctivae and EOM are normal.   Neck: Normal range of motion. Neck supple. No tracheal deviation present. No thyromegaly present.   Cardiovascular: Normal rate, regular rhythm and normal heart sounds.   No murmur heard.  Pulmonary/Chest: Effort normal and breath sounds normal. No respiratory distress. She has " no wheezes.   Abdominal: Soft. Bowel sounds are normal. There is no tenderness. There is no rebound and no guarding.   Musculoskeletal: Normal range of motion. She exhibits no edema, tenderness or deformity.   Neurological: She is alert and oriented to person, place, and time. No cranial nerve deficit.   Skin: Skin is warm and dry. No rash noted.   Psychiatric: She has a normal mood and affect. Her behavior is normal. Judgment and thought content normal.       Lab Review  Glucose (mg/dL)   Date Value   07/09/2019 216 (H)     Sodium (mmol/L)   Date Value   07/09/2019 141     Potassium (mmol/L)   Date Value   07/09/2019 3.7     Chloride (mmol/L)   Date Value   07/09/2019 99     CO2 (mmol/L)   Date Value   07/09/2019 27     BUN (mg/dL)   Date Value   07/09/2019 9     Creatinine (mg/dL)   Date Value   07/09/2019 0.7     Hemoglobin A1C   Date Value   03/13/2020 7.6   07/09/2019 9.9 % (H)   02/25/2019 8.3       Assessment/Plan      1. Type 2 diabetes mellitus with hyperglycemia, with long-term current use of insulin (CMS/[facility])    2. Essential hypertension    3. Hyperlipidemia, unspecified hyperlipidemia type    4. Hyperadrenalism (CMS/[facility])    5. Vitamin D deficiency    .    Medications prescribed:  Outpatient Encounter Medications as of 3/23/2020   Medication Sig Dispense Refill   • ALPRAZolam (XANAX) 1 MG tablet Take 1 mg by mouth 2 (Two) Times a Day As Needed for Anxiety.     • bumetanide (BUMEX) 1 MG tablet Take 1 mg by mouth Daily.     • Continuous Blood Gluc  (FREESTYLE AARON 14 DAY READER) device 1 each Daily. Use as indicated, Dx is E11.9 1 Device 1   • Continuous Blood Gluc Sensor (FREESTYLE AARON 14 DAY SENSOR) misc 1 each Every 14 (Fourteen) Days. Dx is E11.9 2 each 11   • Dulaglutide 1.5 MG/0.5ML solution pen-injector Inject 1.5 mg under the skin into the appropriate area as directed 1 (One) Time Per Week. 4 pen 11   • Empagliflozin (JARDIANCE) 10 MG tablet Take 1 tablet by mouth Daily. Before bkfast 30  tablet 11   • fluconazole (DIFLUCAN) 150 MG tablet Take 1 tablet weekly 4 tablet 11   • FLUoxetine (PROzac) 40 MG capsule Take 80 mg by mouth Daily.  5   • Insulin Regular Human (AFREZZA) 4 & 8 & 12 units powder Inhale 4-32 Units 3 (Three) Times a Day With Meals. Max dose 96 units per day 360 each 11   • lisinopril (PRINIVIL,ZESTRIL) 10 MG tablet Take 10 mg by mouth Daily.     • metFORMIN (GLUCOPHAGE) 1000 MG tablet Take 1 tablet by mouth 2 (Two) Times a Day With Meals. 60 tablet 11   • nitrofurantoin, macrocrystal-monohydrate, (MACROBID) 100 MG capsule Take 1 capsule by mouth 2 (Two) Times a Day. 20 capsule 0   • omeprazole (priLOSEC) 40 MG capsule Take 40 mg by mouth Daily.     • oxyCODONE-acetaminophen (PERCOCET)  MG per tablet Take 1 tablet by mouth Every 8 (Eight) Hours As Needed for Moderate Pain .     • polyethylene glycol (MIRALAX) packet Take 17 g by mouth Daily.     • pregabalin (LYRICA) 75 MG capsule      • TOUJEO MAX SOLOSTAR 300 UNIT/ML solution pen-injector injection INJECT 100 UNITS SUBQ DAILY *BEGIN WITH 20 UNITS AT BED BY MAY TITRATE UP  UNITS AT BED 30 mL 5     No facility-administered encounter medications on file as of 3/23/2020.        Orders placed during this encounter include:  Orders Placed This Encounter   Procedures   • Hemoglobin A1c     This order was created through External Result Entry     Patient has type 2 diabetes with hyperglycemia and hypoglycemia     Glycemic Management:      Lab Results   Component Value Date    HGBA1C 7.6 03/13/2020                  Toujeo taking 100 units     Now doing 50          ===========================================            synjardy xr 5/1000, 2 tabs w bkfast--- stop due to chronic yeast infection            ===========================================     Trulicity  1.5 mg weekly       ===========================================          Afrezza         Up to 32 units with meals needed because of lipohypertrophy      Doing only 8 w  meals , eating much better       Metformin      Lipid Management      No results found for: CHOL, CHLPL, TRIG, HDL, LDL, LDLDIRECT     No statin    labs with PCP , not on statin      Blood Pressure Management:          On lisinopril 10 mg daily         Microvascular Complication Monitoring:       Eye Exam Evaluation, within 1 year     -----------     Last Microalbumin-Proteinuria Assessment     No results found for: MALBCRERATIO     No results found for: UTPCR     -----------        Neuropathy, none              Weight Related:      Patient's Body mass index is 40.53 kg/m². BMI is above normal parameters. Recommendations include: educational material.     Rule out Cushing's --- weigh gain, rounding facies, lose of muscle tone in legs --ruled out with dex. Stim test     Will collect salivary time 3 at 8 am and 11 pm --no results      Dexamethasone suppression         Component      Latest Ref Rng & Units 4/12/2019   Cortisol - AM      6.2 - 19.4 ug/dL 0.7 (L)   Dexamethasone, Serum      ng/dL 171         Normal response to the dex. Stimulation test            Bone Health     vitamin d def.      Reassess      Thyroid Health     No results found for: TSH                   Other Diabetes Related Aspects         No results found for: [id number]                 4. Follow-up: No follow-ups on file.

## 2020-06-25 ENCOUNTER — OFFICE VISIT (OUTPATIENT)
Dept: ENDOCRINOLOGY | Facility: CLINIC | Age: 50
End: 2020-06-25

## 2020-06-25 VITALS
DIASTOLIC BLOOD PRESSURE: 80 MMHG | HEART RATE: 74 BPM | BODY MASS INDEX: 40.78 KG/M2 | OXYGEN SATURATION: 99 % | HEIGHT: 67 IN | SYSTOLIC BLOOD PRESSURE: 124 MMHG | WEIGHT: 259.8 LBS

## 2020-06-25 DIAGNOSIS — E78.5 HYPERLIPIDEMIA, UNSPECIFIED HYPERLIPIDEMIA TYPE: ICD-10-CM

## 2020-06-25 DIAGNOSIS — E27.5 HYPERADRENALISM (HCC): ICD-10-CM

## 2020-06-25 DIAGNOSIS — E65 LIPOHYPERTROPHY: ICD-10-CM

## 2020-06-25 DIAGNOSIS — Z79.4 TYPE 2 DIABETES MELLITUS WITH HYPERGLYCEMIA, WITH LONG-TERM CURRENT USE OF INSULIN (HCC): Primary | ICD-10-CM

## 2020-06-25 DIAGNOSIS — I10 ESSENTIAL HYPERTENSION: ICD-10-CM

## 2020-06-25 DIAGNOSIS — E55.9 VITAMIN D DEFICIENCY: ICD-10-CM

## 2020-06-25 DIAGNOSIS — F40.231 FEAR OF INJECTIONS: ICD-10-CM

## 2020-06-25 DIAGNOSIS — E11.65 TYPE 2 DIABETES MELLITUS WITH HYPERGLYCEMIA, WITH LONG-TERM CURRENT USE OF INSULIN (HCC): Primary | ICD-10-CM

## 2020-06-25 PROCEDURE — 95251 CONT GLUC MNTR ANALYSIS I&R: CPT | Performed by: INTERNAL MEDICINE

## 2020-06-25 PROCEDURE — 99214 OFFICE O/P EST MOD 30 MIN: CPT | Performed by: INTERNAL MEDICINE

## 2020-06-25 NOTE — PROGRESS NOTES
Subjective    Dulcemadi Kim is a 50 y.o. female. she is here today for follow-up.    Diabetes   Pertinent negatives for hypoglycemia include no confusion, dizziness, headaches, pallor or tremors. Pertinent negatives for diabetes include no chest pain, no fatigue, no polydipsia, no polyphagia, no polyuria and no weakness.          Primary Care Provider   Tricia Stokes, DO      Diabetes        Duration since 2010      Timing - Diabetes is Constant    Quality -  Near goal, see taiwo      Severity -  severe     Complications - retinopathy     Alleviating Factors: Compliance        July 2018 , had Lap Band Surgery      Aggravating Factors :  Gets steroid injections for right shoulder pain        Current diet  Admits to high carb intake      Current exercise walking     Current monitoring regimen: home blood tests - up to 12 x daily       Lab Results   Component Value Date    HGBA1C 7.6 03/13/2020                Home blood sugar records:      Taiwo for the past 2 weeks     June 12, to June 25    In range 64%    low2 %    High 34%    Rise mainly post lunch and supper     Hypoglycemia in early am , MN to 2 am                The following portions of the patient's history were reviewed and updated as appropriate:   Past Medical History:   Diagnosis Date   • Type 2 diabetes mellitus with hyperglycemia, with long-term current use of insulin (CMS/[facility]) 10/25/2018     No past surgical history on file.  Family History   Problem Relation Age of Onset   • Diabetes Mother      OB History    None       Current Outpatient Medications   Medication Sig Dispense Refill   • ALPRAZolam (XANAX) 1 MG tablet Take 1 mg by mouth 2 (Two) Times a Day As Needed for Anxiety.     • bumetanide (BUMEX) 1 MG tablet Take 1 mg by mouth Daily.     • Continuous Blood Gluc  (FREESTYLE TAIWO 14 DAY READER) device 1 each Daily. Use as indicated, Dx is E11.9 1 Device 1   • Continuous Blood Gluc Sensor (FREESTYLE TAIWO 14 DAY SENSOR)  misc 1 each Every 14 (Fourteen) Days. Dx is E11.9 2 each 11   • Dulaglutide 1.5 MG/0.5ML solution pen-injector Inject 1.5 mg under the skin into the appropriate area as directed 1 (One) Time Per Week. 4 pen 11   • Empagliflozin (JARDIANCE) 10 MG tablet Take 1 tablet by mouth Daily. Before bkfast 30 tablet 11   • fluconazole (DIFLUCAN) 150 MG tablet Take 1 tablet weekly 4 tablet 11   • FLUoxetine (PROzac) 40 MG capsule Take 80 mg by mouth Daily.  5   • Insulin Regular Human (Afrezza) 4 & 8 & 12 units powder Inhale 4-32 Units 3 (Three) Times a Day With Meals. Max dose 96 units per day 360 each 11   • lisinopril (PRINIVIL,ZESTRIL) 10 MG tablet Take 10 mg by mouth Daily.     • metFORMIN (GLUCOPHAGE) 1000 MG tablet Take 1 tablet by mouth 2 (Two) Times a Day With Meals. 60 tablet 11   • omeprazole (priLOSEC) 40 MG capsule Take 40 mg by mouth Daily.     • oxyCODONE-acetaminophen (PERCOCET)  MG per tablet Take 1 tablet by mouth Every 8 (Eight) Hours As Needed for Moderate Pain .     • polyethylene glycol (MIRALAX) packet Take 17 g by mouth Daily.     • pregabalin (LYRICA) 75 MG capsule      • TOUJEO MAX SOLOSTAR 300 UNIT/ML solution pen-injector injection INJECT 100 UNITS SUBQ DAILY *BEGIN WITH 20 UNITS AT BED BY MAY TITRATE UP  UNITS AT BED 30 mL 5     No current facility-administered medications for this visit.      Allergies   Allergen Reactions   • Ultram [Tramadol Hcl] Shortness Of Breath     Social History     Socioeconomic History   • Marital status: Single     Spouse name: Not on file   • Number of children: Not on file   • Years of education: Not on file   • Highest education level: Not on file   Tobacco Use   • Smoking status: Never Smoker   • Smokeless tobacco: Never Used   Substance and Sexual Activity   • Alcohol use: No   • Drug use: No   • Sexual activity: Defer       Review of Systems  Review of Systems   Constitutional: Negative for activity change, appetite change, diaphoresis and fatigue.  "  HENT: Negative for facial swelling, sneezing, sore throat, tinnitus, trouble swallowing and voice change.    Eyes: Negative for photophobia, pain, discharge, redness, itching and visual disturbance.   Respiratory: Negative for apnea, cough, choking, chest tightness and shortness of breath.    Cardiovascular: Negative for chest pain, palpitations and leg swelling.   Gastrointestinal: Negative for abdominal distention, abdominal pain, constipation, diarrhea, nausea and vomiting.   Endocrine: Negative for cold intolerance, heat intolerance, polydipsia, polyphagia and polyuria.   Genitourinary: Negative for difficulty urinating, dysuria, frequency, hematuria and urgency.   Musculoskeletal: Positive for arthralgias. Negative for back pain, gait problem, joint swelling, myalgias, neck pain and neck stiffness.        Right shoulder pain    Skin: Negative for color change, pallor, rash and wound.   Neurological: Negative for dizziness, tremors, weakness, light-headedness, numbness and headaches.   Hematological: Negative for adenopathy. Does not bruise/bleed easily.   Psychiatric/Behavioral: Negative for behavioral problems, confusion and sleep disturbance.        Objective    /80 (BP Location: Right arm, Patient Position: Sitting, Cuff Size: Large Adult)   Pulse 74   Ht 170.2 cm (67\")   Wt 118 kg (259 lb 12.8 oz)   SpO2 99%   BMI 40.69 kg/m²   Physical Exam   Constitutional: She is oriented to person, place, and time. She appears well-developed and well-nourished. No distress.   HENT:   Head: Normocephalic and atraumatic.   Right Ear: External ear normal.   Left Ear: External ear normal.   Nose: Nose normal.   Eyes: Pupils are equal, round, and reactive to light. Conjunctivae and EOM are normal.   Neck: Normal range of motion. Neck supple. No tracheal deviation present. No thyromegaly present.   Cardiovascular: Normal rate, regular rhythm and normal heart sounds.   No murmur heard.  Pulmonary/Chest: Effort " normal and breath sounds normal. No respiratory distress. She has no wheezes.   Abdominal: Soft. Bowel sounds are normal. There is no tenderness. There is no rebound and no guarding.   Musculoskeletal: Normal range of motion. She exhibits no edema, tenderness or deformity.   Neurological: She is alert and oriented to person, place, and time. No cranial nerve deficit.   Skin: Skin is warm and dry. No rash noted.   Psychiatric: She has a normal mood and affect. Her behavior is normal. Judgment and thought content normal.       Lab Review  Glucose (mg/dL)   Date Value   07/09/2019 216 (H)     Sodium (mmol/L)   Date Value   07/09/2019 141     Potassium (mmol/L)   Date Value   07/09/2019 3.7     Chloride (mmol/L)   Date Value   07/09/2019 99     CO2 (mmol/L)   Date Value   07/09/2019 27     BUN (mg/dL)   Date Value   07/09/2019 9     Creatinine (mg/dL)   Date Value   07/09/2019 0.7     Hemoglobin A1C   Date Value   03/13/2020 7.6   07/09/2019 9.9 % (H)   02/25/2019 8.3       Assessment/Plan      1. Type 2 diabetes mellitus with hyperglycemia, with long-term current use of insulin (CMS/[facility])    2. Essential hypertension    3. Hyperlipidemia, unspecified hyperlipidemia type    4. Hyperadrenalism (CMS/[facility])    5. Vitamin D deficiency    .    Medications prescribed:  Outpatient Encounter Medications as of 6/25/2020   Medication Sig Dispense Refill   • ALPRAZolam (XANAX) 1 MG tablet Take 1 mg by mouth 2 (Two) Times a Day As Needed for Anxiety.     • bumetanide (BUMEX) 1 MG tablet Take 1 mg by mouth Daily.     • Continuous Blood Gluc  (FREESTYLE AARON 14 DAY READER) device 1 each Daily. Use as indicated, Dx is E11.9 1 Device 1   • Continuous Blood Gluc Sensor (FREESTYLE AARON 14 DAY SENSOR) misc 1 each Every 14 (Fourteen) Days. Dx is E11.9 2 each 11   • Dulaglutide 1.5 MG/0.5ML solution pen-injector Inject 1.5 mg under the skin into the appropriate area as directed 1 (One) Time Per Week. 4 pen 11   • Empagliflozin  (JARDIANCE) 10 MG tablet Take 1 tablet by mouth Daily. Before bkfast 30 tablet 11   • fluconazole (DIFLUCAN) 150 MG tablet Take 1 tablet weekly 4 tablet 11   • FLUoxetine (PROzac) 40 MG capsule Take 80 mg by mouth Daily.  5   • Insulin Regular Human (Afrezza) 4 & 8 & 12 units powder Inhale 4-32 Units 3 (Three) Times a Day With Meals. Max dose 96 units per day 360 each 11   • lisinopril (PRINIVIL,ZESTRIL) 10 MG tablet Take 10 mg by mouth Daily.     • metFORMIN (GLUCOPHAGE) 1000 MG tablet Take 1 tablet by mouth 2 (Two) Times a Day With Meals. 60 tablet 11   • omeprazole (priLOSEC) 40 MG capsule Take 40 mg by mouth Daily.     • oxyCODONE-acetaminophen (PERCOCET)  MG per tablet Take 1 tablet by mouth Every 8 (Eight) Hours As Needed for Moderate Pain .     • polyethylene glycol (MIRALAX) packet Take 17 g by mouth Daily.     • pregabalin (LYRICA) 75 MG capsule      • TOUJEO MAX SOLOSTAR 300 UNIT/ML solution pen-injector injection INJECT 100 UNITS SUBQ DAILY *BEGIN WITH 20 UNITS AT BED BY MAY TITRATE UP  UNITS AT BED 30 mL 5   • [DISCONTINUED] nitrofurantoin, macrocrystal-monohydrate, (MACROBID) 100 MG capsule Take 1 capsule by mouth 2 (Two) Times a Day. 20 capsule 0     No facility-administered encounter medications on file as of 6/25/2020.        Orders placed during this encounter include:  No orders of the defined types were placed in this encounter.    Patient has type 2 diabetes with hyperglycemia and hypoglycemia     Glycemic Management:      Lab Results   Component Value Date    HGBA1C 7.6 03/13/2020            Taiwo for the past 2 weeks     June 12, to June 25    In range 64%    low2 %    High 34%    Rise mainly post lunch and supper     Hypoglycemia in early am , MN to 2 am             Toujeo taking 100 units am           ===========================================           Metformin and Jardiance    taking weekly diflucan, try to stop         ===========================================     Trulicity   1.5 mg weekly       ===========================================          Afrezza         Up to 32 units with meals needed because of lipohypertrophy  , max dose 96 units     I sent to [facility] but if not covered, send to [facility]        needs afrezza since lipohypertrophy and fear of injections      Lipid Management      No results found for: CHOL, CHLPL, TRIG, HDL, LDL, LDLDIRECT     No statin    labs with PCP , not on statin      Blood Pressure Management:          On lisinopril 10 mg daily         Microvascular Complication Monitoring:       Eye Exam Evaluation, within 1 year     -----------     Last Microalbumin-Proteinuria Assessment     No results found for: MALBCRERATIO     No results found for: UTPCR     -----------        Neuropathy, none              Weight Related:      Patient's Body mass index is 40.69 kg/m². BMI is above normal parameters. Recommendations include: educational material.     Rule out Cushing's --- weigh gain, rounding facies, lose of muscle tone in legs --ruled out with dex. Stim test     Will collect salivary time 3 at 8 am and 11 pm --no results      Dexamethasone suppression         Component      Latest Ref Rng & Units 2019   Cortisol - AM      6.2 - 19.4 ug/dL 0.7 (L)   Dexamethasone, Serum      ng/dL 171         Normal response to the dex. Stimulation test            Bone Health     vitamin d def.      Reassess      Thyroid Health     No results found for: TSH                   Other Diabetes Related Aspects         No results found for: [id number]                 4. Follow-up: No follow-ups on file.        Patient Demographics     Patient Name  Lucila Kim Sex  Female   1970 [address]   Address  [address] [address] Phone  [phone] (Home)   Emergency Contact(s)     Name Relation Home Work Mobile   LaurenKandice Friend   [phone]   PCP and Center     Primary Care Provider Phone Center   Tricia Stokes [phone] None   Patient  Employment     Status Employer Address   Part Time [employer] [facility] [address] [address]   Active Insurance as of 6/25/2020     MEDICARE - MEDICARE A & B     Payor Plan Insurance Group Employer/Plan Group   MEDICARE MEDICARE A & B     Payor Plan Address Payor Plan Phone Number Payor Plan Fax Number Effective Dates   [address] [phone]  10/1/2016 - None Entered   [facility] [id number]      Subscriber Name Subscriber Birth Date Member ID    DAY WLAL 1970 [id number]    [place]     Pay[place] Plan Insurance Group Employer/Plan Group   [place]     Payor Plan Address Payor Plan Phone Number Payor Plan Fax Number Effective Dates   [address] [phone]  4/25/2019 - None Entered   [facility] [id number]      Subscriber Name Subscriber Birth Date Member ID    DAY WALL 1970 [id number]    Current Medications     ALPRAZolam (XANAX) 1 MG tablet   bumetanide (BUMEX) 1 MG tablet   Continuous Blood Gluc  (FREESTYLE AARON 14 DAY READER) device   Continuous Blood Gluc Sensor (FREESTYLE AARON 14 DAY SENSOR) misc   Dulaglutide 1.5 MG/0.5ML solution pen-injector   Empagliflozin (JARDIANCE) 10 MG tablet   fluconazole (DIFLUCAN) 150 MG tablet   FLUoxetine (PROzac) 40 MG capsule   lisinopril (PRINIVIL,ZESTRIL) 10 MG tablet   metFORMIN (GLUCOPHAGE) 1000 MG tablet   omeprazole (priLOSEC) 40 MG capsule   oxyCODONE-acetaminophen (PERCOCET)  MG per tablet   polyethylene glycol (MIRALAX) packet   pregabalin (LYRICA) 75 MG capsule   TOUJEO MAX SOLOSTAR 300 UNIT/ML solution pen-injector injection   Insulin Regular Human (Afrezza) 4 & 8 & 12 units powder (Discontinued)   Insulin Regular Human (Afrezza) 4 & 8 & 12 units powder (Discontinued)   Insulin Regular Human 4 & 8 & 12 units powder   nitrofurantoin, macrocrystal-monohydrate, (MACROBID) 100 MG capsule (Discontinued)   Allergies     Ultram [tramadol Hcl]  Shortness Of Breath

## 2020-06-30 ENCOUNTER — TELEPHONE (OUTPATIENT)
Dept: FAMILY MEDICINE CLINIC | Facility: CLINIC | Age: 50
End: 2020-06-30

## 2020-06-30 ENCOUNTER — TELEPHONE (OUTPATIENT)
Dept: ENDOCRINOLOGY | Facility: CLINIC | Age: 50
End: 2020-06-30

## 2020-07-01 NOTE — TELEPHONE ENCOUNTER
I called and spoke with pharmacy and let them know they could cancel the script for afrezza because  The coverage is better there

## 2020-07-15 RX ORDER — FLUOXETINE HYDROCHLORIDE 40 MG/1
80 CAPSULE ORAL DAILY
Qty: 180 CAPSULE | Refills: 0 | Status: SHIPPED | OUTPATIENT
Start: 2020-07-15

## 2020-07-15 NOTE — TELEPHONE ENCOUNTER
Received fax from pharmacy requesting refill on pts medication(s). Pt was last seen in office on 10/25/2019  and has a follow up scheduled for Visit date not found. Will send request to  Dr. Lyla Rodriguez  for patient.      Requested Prescriptions     Pending Prescriptions Disp Refills    FLUoxetine (PROZAC) 40 MG capsule [Pharmacy Med Name: FLUOXETINE HCL 40 MG CAPSULE] 180 capsule 0     Sig: Take 2 capsules by mouth daily

## 2020-08-05 RX ORDER — EMPAGLIFLOZIN 10 MG/1
TABLET, FILM COATED ORAL
Qty: 30 TABLET | Refills: 11 | Status: SHIPPED | OUTPATIENT
Start: 2020-08-05 | End: 2021-06-04

## 2020-09-29 ENCOUNTER — TELEPHONE (OUTPATIENT)
Dept: ENDOCRINOLOGY | Facility: CLINIC | Age: 50
End: 2020-09-29

## 2020-09-29 ENCOUNTER — OFFICE VISIT (OUTPATIENT)
Dept: ENDOCRINOLOGY | Facility: CLINIC | Age: 50
End: 2020-09-29

## 2020-09-29 VITALS
HEART RATE: 72 BPM | DIASTOLIC BLOOD PRESSURE: 90 MMHG | HEIGHT: 67 IN | WEIGHT: 253.8 LBS | SYSTOLIC BLOOD PRESSURE: 140 MMHG | BODY MASS INDEX: 39.83 KG/M2 | OXYGEN SATURATION: 98 %

## 2020-09-29 DIAGNOSIS — Z79.4 TYPE 2 DIABETES MELLITUS WITH HYPERGLYCEMIA, WITH LONG-TERM CURRENT USE OF INSULIN (HCC): Primary | ICD-10-CM

## 2020-09-29 DIAGNOSIS — I10 ESSENTIAL HYPERTENSION: ICD-10-CM

## 2020-09-29 DIAGNOSIS — E11.65 TYPE 2 DIABETES MELLITUS WITH HYPERGLYCEMIA, WITH LONG-TERM CURRENT USE OF INSULIN (HCC): Primary | ICD-10-CM

## 2020-09-29 DIAGNOSIS — E27.5 HYPERADRENALISM (HCC): ICD-10-CM

## 2020-09-29 DIAGNOSIS — E78.5 HYPERLIPIDEMIA, UNSPECIFIED HYPERLIPIDEMIA TYPE: ICD-10-CM

## 2020-09-29 PROCEDURE — 95251 CONT GLUC MNTR ANALYSIS I&R: CPT | Performed by: INTERNAL MEDICINE

## 2020-09-29 PROCEDURE — 99214 OFFICE O/P EST MOD 30 MIN: CPT | Performed by: INTERNAL MEDICINE

## 2020-10-04 RX ORDER — FLASH GLUCOSE SENSOR
1 KIT MISCELLANEOUS
Qty: 2 EACH | Refills: 11 | Status: SHIPPED | OUTPATIENT
Start: 2020-10-04 | End: 2021-09-01

## 2020-10-04 RX ORDER — INSULIN GLARGINE 300 U/ML
INJECTION, SOLUTION SUBCUTANEOUS
Qty: 7 PEN | Refills: 3 | Status: SHIPPED | OUTPATIENT
Start: 2020-10-04 | End: 2020-12-11

## 2020-10-04 RX ORDER — FLUCONAZOLE 150 MG/1
TABLET ORAL
Qty: 4 TABLET | Refills: 11 | Status: SHIPPED | OUTPATIENT
Start: 2020-10-04 | End: 2021-12-16

## 2020-10-04 RX ORDER — METFORMIN HYDROCHLORIDE 500 MG/1
TABLET, EXTENDED RELEASE ORAL
Qty: 120 TABLET | Refills: 11 | Status: SHIPPED | OUTPATIENT
Start: 2020-10-04 | End: 2021-06-04

## 2020-10-15 ENCOUNTER — DOCUMENTATION (OUTPATIENT)
Dept: ENDOCRINOLOGY | Facility: CLINIC | Age: 50
End: 2020-10-15

## 2020-11-20 ENCOUNTER — DOCUMENTATION (OUTPATIENT)
Dept: ENDOCRINOLOGY | Facility: CLINIC | Age: 50
End: 2020-11-20

## 2020-12-11 RX ORDER — INSULIN GLARGINE 300 U/ML
INJECTION, SOLUTION SUBCUTANEOUS
Qty: 4 PEN | Refills: 5 | Status: SHIPPED | OUTPATIENT
Start: 2020-12-11 | End: 2021-09-01

## 2021-03-05 ENCOUNTER — DOCUMENTATION (OUTPATIENT)
Dept: ENDOCRINOLOGY | Facility: CLINIC | Age: 51
End: 2021-03-05

## 2021-03-05 ENCOUNTER — OFFICE VISIT (OUTPATIENT)
Dept: ENDOCRINOLOGY | Facility: CLINIC | Age: 51
End: 2021-03-05

## 2021-03-05 VITALS
HEIGHT: 67 IN | OXYGEN SATURATION: 97 % | WEIGHT: 252.2 LBS | SYSTOLIC BLOOD PRESSURE: 120 MMHG | DIASTOLIC BLOOD PRESSURE: 80 MMHG | BODY MASS INDEX: 39.58 KG/M2 | HEART RATE: 90 BPM

## 2021-03-05 DIAGNOSIS — E11.9 WELL CONTROLLED TYPE 2 DIABETES MELLITUS (HCC): Primary | ICD-10-CM

## 2021-03-05 DIAGNOSIS — I10 ESSENTIAL HYPERTENSION: ICD-10-CM

## 2021-03-05 DIAGNOSIS — E78.5 DYSLIPIDEMIA: ICD-10-CM

## 2021-03-05 LAB — HBA1C MFR BLD: 8.7 %

## 2021-03-05 PROCEDURE — 83036 HEMOGLOBIN GLYCOSYLATED A1C: CPT | Performed by: INTERNAL MEDICINE

## 2021-03-05 PROCEDURE — 95251 CONT GLUC MNTR ANALYSIS I&R: CPT | Performed by: INTERNAL MEDICINE

## 2021-03-05 PROCEDURE — 99214 OFFICE O/P EST MOD 30 MIN: CPT | Performed by: INTERNAL MEDICINE

## 2021-03-05 RX ORDER — EMPAGLIFLOZIN, METFORMIN HYDROCHLORIDE 5; 1000 MG/1; MG/1
2 TABLET, EXTENDED RELEASE ORAL
Qty: 60 TABLET | Refills: 11 | Status: SHIPPED | OUTPATIENT
Start: 2021-03-05 | End: 2021-06-04 | Stop reason: SDUPTHER

## 2021-03-05 NOTE — PROGRESS NOTES
"    Subjective    Corinthy D Julio is a 50 y.o. female. she is here today for follow-up.    Diabetes  Pertinent negatives for hypoglycemia include no confusion, dizziness, headaches, pallor or tremors. Pertinent negatives for diabetes include no chest pain, no fatigue, no polydipsia, no polyphagia, no polyuria and no weakness.          Primary Care Provider   Tricia Stkoes, DO        Duration since 2010      Timing - Diabetes is Constant    Quality - at goal , Aic misleading      Complications - retinopathy     Alleviating Factors: Compliance        July 2018 , had Lap Band Surgery      Aggravating Factors :  Gets steroid injections for right shoulder pain , not lately       Current diet  Admits to high carb intake but trying to limit      Current exercise walking     Current monitoring regimen: home blood tests - up to 12 x daily - w  sohan    Lab Results   Component Value Date    HGBA1C 7.6 03/13/2020                Home blood sugar records:      Sohan for the past 2 weeks   Detailed below              Objective    /80   Pulse 90   Ht 170.2 cm (67\")   Wt 114 kg (252 lb 3.2 oz)   SpO2 97%   BMI 39.50 kg/m²   Physical Exam   Constitutional: She is oriented to person, place, and time. She appears well-developed. No distress.   HENT:   Head: Normocephalic and atraumatic.   Right Ear: External ear normal.   Left Ear: External ear normal.   Nose: Nose normal.   Eyes: Pupils are equal, round, and reactive to light. Conjunctivae are normal.   Neck: Normal range of motion. Neck supple. No tracheal deviation present. No thyromegaly present.   Cardiovascular: Normal rate, regular rhythm and normal heart sounds.   No murmur heard.  Pulmonary/Chest: Effort normal and breath sounds normal. No respiratory distress. She has no wheezes.   Abdominal: Soft. Bowel sounds are normal. There is no abdominal tenderness. There is no rebound and no guarding.   Musculoskeletal: Normal range of motion. No tenderness " or deformity.   Neurological: She is alert and oriented to person, place, and time. No cranial nerve deficit.   Skin: Skin is warm and dry. No rash noted.   Psychiatric: Her behavior is normal. Judgment and thought content normal.       Lab Review      Assessment/Plan      1. Well controlled type 2 diabetes mellitus (CMS/[facility])    2. Essential hypertension    3. Dyslipidemia    .    Medications prescribed:      Orders placed during this encounter include:  No orders of the defined types were placed in this encounter.    Patient has type 2 diabetes with hyperglycemia and hypoglycemia     Glycemic Management:      Lab Results   Component Value Date    HGBA1C 7.6 03/13/2020            Taiwo 2 weeks reviewed and excellent, time in range more than 70% without hypoglycemia    Variability less than 36%.    Doing an excellent job    Aic not reliable           Toujeo taking 100 units am - 70          ===========================================           Metformin and Jardiance    taking weekly diflucan, try to stop    combine as synjardy      ===========================================     Trulicity  1.5 mg weekly - increased to 3 mg weekly        ===========================================          Afrezza         Up to 32 units with meals needed because of lipohypertrophy  , max dose 96 units       We will change to Omni pod    Approve for  Insulin pump and or Continuous Glucose Sensor     #1  Patient has diabetes mellitus, insulin-dependent.    #2 She performs blood glucose testing at least 4 times daily .    #3  She is requiring  Basal insulin  and Prandial Insulin for a total of at least  4 injections or boluses per day       #4 Patient tests blood sugars at least 4 times daily and makes frequent self-adjustments based on information provided by taiwo    #5 I have personally seen patient within the past 6 months    #6 We plan on seeing her every 2-3 months for continuous adjustment of her diabetes regimen    #8 patient  has day-to-day variation in her mealtime which confounds the degree of insulin dosing with multiple daily injections.    #9 patient has completed diabetes education program with us.    #10 she has demonstrated the ability to self monitor her glucose.        #11 Patient is motivated in improving  diabetes control            Lipid Management      No results found for: CHOL, CHLPL, TRIG, HDL, LDL, LDLDIRECT     No statin    labs with PCP , not on statin      Blood Pressure Management:          On lisinopril 10 mg daily         Microvascular Complication Monitoring:       Eye Exam Evaluation, within 1 year     -----------     Last Microalbumin-Proteinuria Assessment     No results found for: MALBCRERATIO     No results found for: UTPCR     -----------        Neuropathy, none              Weight Related:      Patient's Body mass index is 39.5 kg/m². BMI is above normal parameters. Recommendations include: educational material.     Rule out Cushing's --- weigh gain, rounding facies, lose of muscle tone in legs --ruled out with dex. Stim test     Will collect salivary time 3 at 8 am and 11 pm --no results      Dexamethasone suppression         Component      Latest Ref Rng & Units 4/12/2019   Cortisol - AM      6.2 - 19.4 ug/dL 0.7 (L)   Dexamethasone, Serum      ng/dL 171         Normal response to the dex. Stimulation test            Bone Health     vitamin d def.      Reassess      Thyroid Health     No results found for: TSH                   Other Diabetes Related Aspects         No results found for: [id number]                 4. Follow-up: No follow-ups on file.

## 2021-04-02 ENCOUNTER — TELEPHONE (OUTPATIENT)
Dept: ENDOCRINOLOGY | Facility: CLINIC | Age: 51
End: 2021-04-02

## 2021-04-02 NOTE — TELEPHONE ENCOUNTER
Pt left a vm, she is needing insulin for her pump to be called in please. She did not state pharmacy so I tried to call and had to leave her a voicemail asking what pharmacy.

## 2021-04-05 NOTE — TELEPHONE ENCOUNTER
Pt is needing her insulin in vials sent into     [facility]Y #[id number] - [place], [place] - [address] [phone]  - [phone] FX

## 2021-05-13 ENCOUNTER — DOCUMENTATION (OUTPATIENT)
Dept: ENDOCRINOLOGY | Facility: CLINIC | Age: 51
End: 2021-05-13

## 2021-05-13 ENCOUNTER — TELEPHONE (OUTPATIENT)
Dept: ENDOCRINOLOGY | Facility: CLINIC | Age: 51
End: 2021-05-13

## 2021-05-13 NOTE — TELEPHONE ENCOUNTER
Pt left a vm stating that insurance is refusing to pay for novolog, and that the pharmacy has been trying to reach out to our office and nobody will do anything in regards to this. Says this has been going on since late March.

## 2021-06-04 ENCOUNTER — OFFICE VISIT (OUTPATIENT)
Dept: ENDOCRINOLOGY | Facility: CLINIC | Age: 51
End: 2021-06-04

## 2021-06-04 ENCOUNTER — TELEPHONE (OUTPATIENT)
Dept: ENDOCRINOLOGY | Facility: CLINIC | Age: 51
End: 2021-06-04

## 2021-06-04 VITALS
OXYGEN SATURATION: 97 % | BODY MASS INDEX: 38.45 KG/M2 | HEIGHT: 67 IN | DIASTOLIC BLOOD PRESSURE: 76 MMHG | HEART RATE: 96 BPM | SYSTOLIC BLOOD PRESSURE: 110 MMHG | WEIGHT: 245 LBS

## 2021-06-04 DIAGNOSIS — E55.9 VITAMIN D DEFICIENCY: ICD-10-CM

## 2021-06-04 DIAGNOSIS — I10 ESSENTIAL HYPERTENSION: ICD-10-CM

## 2021-06-04 DIAGNOSIS — E78.5 DYSLIPIDEMIA: ICD-10-CM

## 2021-06-04 DIAGNOSIS — E11.9 WELL CONTROLLED TYPE 2 DIABETES MELLITUS (HCC): Primary | ICD-10-CM

## 2021-06-04 PROCEDURE — 95251 CONT GLUC MNTR ANALYSIS I&R: CPT | Performed by: INTERNAL MEDICINE

## 2021-06-04 PROCEDURE — 99214 OFFICE O/P EST MOD 30 MIN: CPT | Performed by: INTERNAL MEDICINE

## 2021-06-04 RX ORDER — DULAGLUTIDE 4.5 MG/.5ML
4.5 INJECTION, SOLUTION SUBCUTANEOUS WEEKLY
Qty: 12 PEN | Refills: 3 | Status: SHIPPED | OUTPATIENT
Start: 2021-06-04 | End: 2022-09-28 | Stop reason: SDUPTHER

## 2021-06-04 RX ORDER — EMPAGLIFLOZIN, METFORMIN HYDROCHLORIDE 5; 1000 MG/1; MG/1
2 TABLET, EXTENDED RELEASE ORAL
Qty: 180 TABLET | Refills: 3 | Status: SHIPPED | OUTPATIENT
Start: 2021-06-04 | End: 2022-02-17

## 2021-06-04 NOTE — PROGRESS NOTES
"    Subjective    Lucila LOPEZ Julio is a 50 y.o. female. she is here today for follow-up of T2DM    HPI     T2DM     Duration since 2010      Complications - retinopathy     July 2018 , had Lap Band Surgery but will need to take out due to nausea that preceded trulicity      Aggravating Factors :  Gets steroid injections for right shoulder pain , not lately       Current diet  Admits to high carb intake but trying to limit      Current exercise walking     Current monitoring regimen: home blood tests - up to 12 x daily - w  taiwo    Lab Results   Component Value Date    HGBA1C 8.7 03/05/2021        Home blood sugar records:      Taiwo for the past 2 weeks   Detailed below      PE    /76   Pulse 96   Ht 170.2 cm (67\")   Wt 111 kg (245 lb)   SpO2 97%   BMI 38.37 kg/m²   AOx3  No visible goiter  RRR  CTA  No Edema    Labs    No results found for: WBC, HGB, HCT, MCV, PLT  Lab Results   Component Value Date    GLUCOSE 216 (H) 07/09/2019    BUN 9 07/09/2019    CREATININE 0.7 07/09/2019    EGFRIFNONA >60 07/09/2019    K 3.7 07/09/2019    CO2 27 07/09/2019    CALCIUM 8.7 07/09/2019    ALBUMIN 4.1 07/09/2019    AST 20 07/09/2019    ALT 23 07/09/2019            Lab Review      Assessment/Plan      1. Well controlled type 2 diabetes mellitus (CMS/HCC)    2. Essential hypertension    3. Dyslipidemia    4. Vitamin D deficiency    .    T2DM       Glycemic Management:      Lab Results   Component Value Date    HGBA1C 8.7 03/05/2021            Taiwo 2 weeks reviewed , well controlled type 2 DM        Toujeo taking 100 units am - 80 back off to 56           ===========================================     synjardy 5/1000 mg, 2 tabs w bkfast , much less GMI      ===========================================     Trulicity  1.5 mg weekly - increased to 3 mg weekly - 4.5 mg weekly        ===========================================          Afrezza 12 w meals rather than reacting         Up to 32 units with meals needed because " of lipohypertrophy  , max dose 96 units       Will switch to omnipod, can continue afrezza        Lipid Management      No results found for: CHOL, CHLPL, TRIG, HDL, LDL, LDLDIRECT     No statin    labs with PCP , not on statin      Blood Pressure Management:          On lisinopril 10 mg daily         Microvascular Complication Monitoring:       Eye Exam Evaluation, within 1 year     -----------     Last Microalbumin-Proteinuria Assessment     No results found for: MALBCRERATIO     No results found for: UTPCR     -----------        Neuropathy, none              Weight Related:      Patient's Body mass index is 38.37 kg/m². BMI is above normal parameters. Recommendations include: educational material.     Rule out Cushing's --- weigh gain, rounding facies, lose of muscle tone in legs --ruled out with dex. Stim test     Will collect salivary time 3 at 8 am and 11 pm --no results      Dexamethasone suppression         Component      Latest Ref Rng & Units 4/12/2019   Cortisol - AM      6.2 - 19.4 ug/dL 0.7 (L)   Dexamethasone, Serum      ng/dL 171         Normal response to the dex. Stimulation test            Bone Health     vitamin d def.      Reassess      Thyroid Health     No results found for: TSH                   Other Diabetes Related Aspects         No results found for: [id number]                 4. Follow-up: No follow-ups on file.     Tricia wilkinson

## 2021-06-04 NOTE — TELEPHONE ENCOUNTER
[facility] Left a vm requesting most recent office visit notes faxed back, says they already sent the request.

## 2021-07-02 ENCOUNTER — TELEPHONE (OUTPATIENT)
Dept: ENDOCRINOLOGY | Facility: CLINIC | Age: 51
End: 2021-07-02

## 2021-07-02 NOTE — TELEPHONE ENCOUNTER
LM for pt to increase from 1.8 un basal to 2.0 un basal all day. Any questions, call us back. Number provided.

## 2021-07-02 NOTE — TELEPHONE ENCOUNTER
Pt wants a script for mastisol that keeps sohan on   Has been to 3 pharmacies and non carry it .  Was told [facility] or [facility]  but call [facility] first [phone]     Question for Luci   Insulin pump may not be set right woke up in the ams and was 200 fasting for the last few mornings   This morning was in the 165   Was over 300 last night and took extra insulin .    Currently set at 1.8  [phone]

## 2021-07-05 ENCOUNTER — NURSE TRIAGE (OUTPATIENT)
Dept: CALL CENTER | Facility: HOSPITAL | Age: 51
End: 2021-07-05

## 2021-07-06 NOTE — TELEPHONE ENCOUNTER
"0013- States she ate a little bit of a candybar because she vomited a hot dog. States blood sugar is 191.        States she is wearing an insulin pump and it  and was without her insulin until approximately 630 tonight. States at 730 it was reading \"high\", so she gave herself 30 units of regular insulin.     States now sugar is 142.    1922- high  2001- high  2030- 387  2227- 186  2302- 142    States she just ate a hot dog. Discussed eating complex carbs and protein.     Instructions given to eat, recheck blood sugar in 30-60 minutes unless starts feeling bad. Call back with blood sugar. She is agreeable.     Reason for Disposition  • [1] Low blood sugar symptoms with no other adult present AND [2] hasn't tried Care Advice    Additional Information  • Negative: Unconscious or difficult to awaken  • Negative: Seizure occurs  • Negative: Acting confused (e.g., disoriented, slurred speech)  • Negative: Very weak (e.g., can't stand)  • Negative: Sounds like a life-threatening emergency to the triager  • Negative: [1] Vomiting AND [2] signs of dehydration (e.g., very dry mouth, lightheaded, dark urine)  • Negative: [1] Low blood sugar symptoms persist > 30 minutes AND [2] using low blood sugar Care Advice  • Negative: [1] Low blood glucose (< 70 mg/dL  or 3.9 mmol/L) persists > 30 minutes AND [2] using low blood sugar Care Advice  • Negative: Patient sounds very sick or weak to the triager  • Negative: [1] Low blood glucose (< 70 mg/dL  or 3.9 mmol/L) with no other adult present AND [2] hasn't tried Care Advice  • Negative: Diabetes drug error or overdose (e.g., insulin error or extra dose)  • Negative: [1] Caller has URGENT medication or insulin pump question AND [2] triager unable to answer question  • Negative: [1] Blood glucose < 70  mg/dL (3.9 mmol/L) or symptomatic, now improved with Care Advice AND [2] cause unknown  • Negative: [1] Caller has NON-URGENT medication or insulin pump question AND [2] triager " "unable to answer question  • Negative: [1] Morning (before breakfast) blood glucose < 80 mg/dL (4.4 mmol/L) AND [2] more than once in past week  • Negative: [1] Evening (after bedtime snack) blood glucose < 100 mg/dL (5.6 mmol/L) AND [2] more than once in past week    Answer Assessment - Initial Assessment Questions  1. SYMPTOMS: \"What symptoms are you concerned about?\"      See note  2. ONSET:  \"When did the symptoms start?\"      n/a  3. BLOOD GLUCOSE: \"What is your blood glucose level?\"       n/a  4. USUAL RANGE: \"What is your blood glucose level usually?\" (e.g., usual fasting morning value, usual evening value)      n/a  5. TYPE 1 or 2:  \"Do you know what type of diabetes you have?\"  (e.g., Type 1, Type 2, Gestational; doesn't know)       n/a  6. INSULIN: \"Do you take insulin?\" \"What type of insulin(s) do you use? What is the mode of delivery? (syringe, pen; injection or pump) \"When did you last give yourself an insulin dose?\" (i.e., time or hours/minutes ago) \"How much did you give?\" (i.e., how many units)      n/a  7. DIABETES PILLS: \"Do you take any pills for your diabetes?\"      n/a  8. OTHER SYMPTOMS: \"Do you have any symptoms?\" (e.g., fever, frequent urination, difficulty breathing, vomiting)      n/a  9. LOW BLOOD GLUCOSE TREATMENT: \"What have you done so far to treat the low blood glucose level?\"      n/a  10. FOOD: \"When did you last eat or drink?\"        n/a  11. ALONE: \"Are you alone right now or is someone with you?\"         son  12. PREGNANCY: \"Is there any chance you are pregnant?\" \"When was your last menstrual period?\"        n/    Protocols used: DIABETES - LOW BLOOD SUGAR-ADULT-AH      "

## 2021-09-01 ENCOUNTER — OFFICE VISIT (OUTPATIENT)
Dept: ENDOCRINOLOGY | Facility: CLINIC | Age: 51
End: 2021-09-01

## 2021-09-01 VITALS
HEART RATE: 85 BPM | WEIGHT: 246 LBS | DIASTOLIC BLOOD PRESSURE: 70 MMHG | OXYGEN SATURATION: 97 % | BODY MASS INDEX: 38.61 KG/M2 | SYSTOLIC BLOOD PRESSURE: 130 MMHG | HEIGHT: 67 IN

## 2021-09-01 DIAGNOSIS — I10 ESSENTIAL HYPERTENSION: ICD-10-CM

## 2021-09-01 DIAGNOSIS — E78.5 DYSLIPIDEMIA: ICD-10-CM

## 2021-09-01 DIAGNOSIS — E11.9 WELL CONTROLLED TYPE 2 DIABETES MELLITUS (HCC): Primary | ICD-10-CM

## 2021-09-01 PROCEDURE — 99214 OFFICE O/P EST MOD 30 MIN: CPT | Performed by: INTERNAL MEDICINE

## 2021-09-01 PROCEDURE — 95251 CONT GLUC MNTR ANALYSIS I&R: CPT | Performed by: INTERNAL MEDICINE

## 2021-09-01 RX ORDER — ROSUVASTATIN CALCIUM 10 MG/1
10 TABLET, COATED ORAL NIGHTLY
Qty: 30 TABLET | Refills: 11 | Status: SHIPPED | OUTPATIENT
Start: 2021-09-01 | End: 2022-03-15 | Stop reason: SDUPTHER

## 2021-09-01 NOTE — PROGRESS NOTES
"    Subjective    Lucila LOPEZ Julio is a 51 y.o. female. she is here today for follow-up of T2DM    HPI     T2DM     Duration since 2010      Complications - retinopathy     July 2018 , had Lap Band Surgery but will need to take out due to nausea that preceded trulicity      Aggravating Factors :  Gets steroid injections for right shoulder pain , not lately       Current diet  Admits to high carb intake but trying to limit      Current exercise walking     Current monitoring regimen: home blood tests - up to 12 x daily - w  taiwo    Lab Results   Component Value Date    HGBA1C 8.7 03/05/2021        Home blood sugar records:      Taiwo for the past 2 weeks   Detailed below      PE    /70   Pulse 85   Ht 170.2 cm (67\")   Wt 112 kg (246 lb)   SpO2 97%   BMI 38.53 kg/m²   AOx3  No visible goiter  RRR  CTA  No Edema    Labs    No results found for: WBC, HGB, HCT, MCV, PLT  Lab Results   Component Value Date    GLUCOSE 216 (H) 07/09/2019    BUN 9 07/09/2019    CREATININE 0.7 07/09/2019    EGFRIFNONA >60 07/09/2019    K 3.7 07/09/2019    CO2 27 07/09/2019    CALCIUM 8.7 07/09/2019    ALBUMIN 4.1 07/09/2019    AST 20 07/09/2019    ALT 23 07/09/2019            Lab Review      Assessment/Plan      1. Well controlled type 2 diabetes mellitus (CMS/HCC)    2. Essential hypertension    3. Dyslipidemia    .    T2DM       Glycemic Management:      Lab Results   Component Value Date    HGBA1C 8.7 03/05/2021            Taiwo 2 weeks reviewed , well controlled type 2 DM    In range 70%  No lows       Toujeo taking 100 units am - 80 back off to 56   Now basal 48 units per hour on pump           ===========================================     synjardy 5/1000 mg, 2 tabs w bkfast , much less GMI      ===========================================     Trulicity  1.5 mg weekly - increased to 3 mg weekly - 4.5 mg weekly        ===========================================     omnipod   2 u per hour   Carb ratio 8                Lipid " Management      No results found for: CHOL, CHLPL, TRIG, HDL, LDL, LDLDIRECT     No statin    labs with PCP , not on statin    add crestor 10 mg qhs  March 2021,    Blood Pressure Management:          On lisinopril 10 mg daily         Microvascular Complication Monitoring:       Eye Exam Evaluation, within 1 year   no retinopathy   -----------     Last Microalbumin-Proteinuria Assessment     No results found for: MALBCRERATIO     No results found for: UTPCR   gfr more than 60 and no protein in March 2021   -----------        Neuropathy, none              Weight Related:      Patient's Body mass index is 38.53 kg/m². BMI is above normal parameters. Recommendations include: educational material.     Rule out Cushing's --- weigh gain, rounding facies, lose of muscle tone in legs --ruled out with dex. Stim test     Will collect salivary time 3 at 8 am and 11 pm --no results      Dexamethasone suppression         Component      Latest Ref Rng & Units 4/12/2019   Cortisol - AM      6.2 - 19.4 ug/dL 0.7 (L)   Dexamethasone, Serum      ng/dL 171         Normal response to the dex. Stimulation test            Bone Health     vitamin d def.      Reassess      Thyroid Health     No results found for: TSH                   Other Diabetes Related Aspects         No results found for: [id number]                 4. Follow-up: No follow-ups on file.     Tricia wilkinson

## 2021-10-11 ENCOUNTER — HOSPITAL ENCOUNTER (EMERGENCY)
Facility: HOSPITAL | Age: 51
Discharge: HOME | End: 2021-10-11
Attending: EMERGENCY MEDICINE | Admitting: EMERGENCY MEDICINE

## 2021-10-11 ENCOUNTER — APPOINTMENT (OUTPATIENT)
Dept: GENERAL RADIOLOGY | Facility: HOSPITAL | Age: 51
End: 2021-10-11

## 2021-10-11 ENCOUNTER — APPOINTMENT (OUTPATIENT)
Dept: ULTRASOUND IMAGING | Facility: HOSPITAL | Age: 51
End: 2021-10-11

## 2021-10-11 ENCOUNTER — APPOINTMENT (OUTPATIENT)
Dept: CT IMAGING | Facility: HOSPITAL | Age: 51
End: 2021-10-11

## 2021-10-11 VITALS
RESPIRATION RATE: 16 BRPM | HEART RATE: 75 BPM | SYSTOLIC BLOOD PRESSURE: 112 MMHG | OXYGEN SATURATION: 98 % | WEIGHT: 250 LBS | HEIGHT: 67 IN | DIASTOLIC BLOOD PRESSURE: 79 MMHG | TEMPERATURE: 97.7 F | BODY MASS INDEX: 39.24 KG/M2

## 2021-10-11 DIAGNOSIS — M25.562 ACUTE PAIN OF LEFT KNEE: ICD-10-CM

## 2021-10-11 DIAGNOSIS — S29.012A STRAIN OF THORACIC BACK REGION: ICD-10-CM
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
## 2021-10-11 PROCEDURE — 93971 EXTREMITY STUDY: CPT

## 2021-10-11 PROCEDURE — 72131 CT LUMBAR SPINE W/O DYE: CPT

## 2021-10-11 PROCEDURE — 73562 X-RAY EXAM OF KNEE 3: CPT

## 2021-10-11 PROCEDURE — 72128 CT CHEST SPINE W/O DYE: CPT

## 2021-10-11 PROCEDURE — 96372 THER/PROPH/DIAG INJ SC/IM: CPT

## 2021-10-11 PROCEDURE — 93971 EXTREMITY STUDY: CPT | Performed by: SURGERY

## 2021-10-11 PROCEDURE — 99282 EMERGENCY DEPT VISIT SF MDM: CPT

## 2021-10-11 PROCEDURE — 25010000003 HYDROMORPHONE 1 MG/ML SOLUTION: Performed by: EMERGENCY MEDICINE

## 2021-10-11 RX ADMIN — HYDROMORPHONE HYDROCHLORIDE 1 MG: 1 INJECTION, SOLUTION INTRAMUSCULAR; INTRAVENOUS; SUBCUTANEOUS at 13:28

## 2021-10-11 NOTE — ED PROVIDER NOTES
Subjective   Patient is a 51-year-old female who presents to the ER status post a fall.  Patient states she slipped in a puddle on  and fell.  She landed on her back and left knee.  She did not hit her head or have any loss of consciousness.  Patient states she has had upper back pain since that time radiating to her lower back as well as left knee pain.  Patient been taking Percocet at home with minimal improvement.  Patient states since the pain has persisted she decided to come to the ER for evaluation.  She denies any fever, chest pain, shortness of air, abdominal pain, nausea vomiting diarrhea, urinary changes, neurologic changes, neck pain, other extremity pain.          Review of Systems   Constitutional: Negative.    HENT: Negative.    Eyes: Negative.    Respiratory: Negative.    Cardiovascular: Negative.    Gastrointestinal: Negative.    Endocrine: Negative.    Genitourinary: Negative.    Musculoskeletal: Positive for arthralgias, back pain and myalgias.   Skin: Negative.    Allergic/Immunologic: Negative.    Neurological: Negative.    Hematological: Negative.    Psychiatric/Behavioral: Negative.    All other systems reviewed and are negative.      Past Medical History:   Diagnosis Date   • Hyperlipidemia    • Hypertension    • Type 2 diabetes mellitus with hyperglycemia, with long-term current use of insulin ([facility]) 10/25/2018       Allergies   Allergen Reactions   • Ultram [Tramadol Hcl] Shortness Of Breath       Past Surgical History:   Procedure Laterality Date   •  SECTION     • HYSTERECTOMY     • LAPAROSCOPIC GASTRIC BANDING     • SHOULDER ARTHROSCOPY Left        Family History   Problem Relation Age of Onset   • Diabetes Mother        Social History     Socioeconomic History   • Marital status: Single   Tobacco Use   • Smoking status: Never Smoker   • Smokeless tobacco: Never Used   Substance and Sexual Activity   • Alcohol use: No   • Drug use: No   • Sexual activity: Defer            Objective   Physical Exam  Vitals and nursing note reviewed.   Constitutional:       Appearance: She is well-developed.   HENT:      Head: Normocephalic and atraumatic.   Eyes:      Conjunctiva/sclera: Conjunctivae normal.      Pupils: Pupils are equal, round, and reactive to light.   Cardiovascular:      Rate and Rhythm: Normal rate and regular rhythm.      Heart sounds: Normal heart sounds.   Pulmonary:      Effort: Pulmonary effort is normal.      Breath sounds: Normal breath sounds.   Abdominal:      Palpations: Abdomen is soft.      Tenderness: There is no abdominal tenderness.   Musculoskeletal:         General: No deformity. Normal range of motion.      Cervical back: Normal range of motion.      Comments: Tender to palpation left medial knee and T and L spines, nontender to palpation elsewhere including other extremities and C-spine, normal range of motion, neurovascularly intact, stable joint   Skin:     General: Skin is warm.   Neurological:      Mental Status: She is alert and oriented to person, place, and time.      Sensory: Sensation is intact.      Motor: Motor function is intact.   Psychiatric:         Behavior: Behavior normal.         Procedures           ED Course      Patient was given Dilaudid.  Mild improvement.     CT Thoracic Spine Without Contrast   Final Result       No acute osseous injury or malalignment in the thoracic spine.           This report was finalized on 10/11/2021 13:27 by Dr Mark Yee, .      CT Lumbar Spine Without Contrast   Final Result   1. No evidence of lumbar spine fracture.   2. Degenerative changes, as described.       The full report of this exam was immediately signed and available to the   emergency room. The patient is currently in the emergency room.   This report was finalized on 10/11/2021 13:30 by Dr. Yosvany Capps MD.      XR Knee 3 View Left   Final Result   1. No acute osseous injury or malalignment.   2. Grade 2 osteoarthritis.            This report was finalized on 10/11/2021 13:15 by Dr Mark Yee, .      US Venous Doppler Lower Extremity Left (duplex)    (Results Pending)     CT scan of the T and L-spine showed no acute findings.  Did show degenerative changes.  X-rays of the left knee showed no acute findings.  Did show grade 2 osteoarthritis.  Ultrasound the left lower extremity was negative for DVT.  No cause for pain found.  Patient is already taking Percocet at home and advised to continue.  Patient will be discharged home to follow-up with her PCP and orthopedic surgery.  She is to return to the ER immediately for any worsening pain or other concerns.  Patient agreeable.                                     MDM    Final diagnoses:   Fall, initial encounter   Strain of thoracic back region   Acute pain of left knee       ED Disposition  ED Disposition     ED Disposition Condition Comment    Discharge Tricia Collier,   [address] [facility] [address] [phone]    Schedule an appointment as soon as possible for a visit       Freddy Orozco MD  [address] [facility] [id number] [phone]    Schedule an appointment as soon as possible for a visit            Medication List      No changes were made to your prescriptions during this visit.          Rosanna Ocampo MD  10/11/21 6171

## 2021-10-15 ENCOUNTER — DOCUMENTATION (OUTPATIENT)
Dept: ENDOCRINOLOGY | Facility: CLINIC | Age: 51
End: 2021-10-15

## 2021-12-01 ENCOUNTER — OFFICE VISIT (OUTPATIENT)
Dept: ENDOCRINOLOGY | Facility: CLINIC | Age: 51
End: 2021-12-01

## 2021-12-01 ENCOUNTER — LAB (OUTPATIENT)
Dept: LAB | Facility: HOSPITAL | Age: 51
End: 2021-12-01

## 2021-12-01 VITALS
SYSTOLIC BLOOD PRESSURE: 120 MMHG | OXYGEN SATURATION: 98 % | DIASTOLIC BLOOD PRESSURE: 60 MMHG | BODY MASS INDEX: 39.24 KG/M2 | WEIGHT: 250 LBS | HEIGHT: 67 IN | HEART RATE: 86 BPM

## 2021-12-01 DIAGNOSIS — I10 ESSENTIAL HYPERTENSION: ICD-10-CM

## 2021-12-01 DIAGNOSIS — E11.9 WELL CONTROLLED TYPE 2 DIABETES MELLITUS (HCC): Primary | ICD-10-CM

## 2021-12-01 DIAGNOSIS — E78.5 DYSLIPIDEMIA: ICD-10-CM

## 2021-12-01 DIAGNOSIS — E55.9 VITAMIN D DEFICIENCY: ICD-10-CM

## 2021-12-01 LAB
ALBUMIN SERPL-MCNC: 3.9 G/DL (ref 3.5–5.2)
ALBUMIN/GLOB SERPL: 1.3 G/DL
ALP SERPL-CCNC: 88 U/L (ref 39–117)
ALT SERPL W P-5'-P-CCNC: 19 U/L (ref 1–33)
ANION GAP SERPL CALCULATED.3IONS-SCNC: 7 MMOL/L (ref 5–15)
ANISOCYTOSIS BLD QL: NORMAL
AST SERPL-CCNC: 18 U/L (ref 1–32)
BASOPHILS # BLD AUTO: 0.04 10*3/MM3 (ref 0–0.2)
BASOPHILS NFR BLD AUTO: 0.5 % (ref 0–1.5)
BILIRUB SERPL-MCNC: 0.2 MG/DL (ref 0–1.2)
BUN SERPL-MCNC: 12 MG/DL (ref 6–20)
BUN/CREAT SERPL: 12.9 (ref 7–25)
CALCIUM SPEC-SCNC: 9 MG/DL (ref 8.6–10.5)
CHLORIDE SERPL-SCNC: 103 MMOL/L (ref 98–107)
CO2 SERPL-SCNC: 24 MMOL/L (ref 22–29)
CREAT SERPL-MCNC: 0.93 MG/DL (ref 0.57–1)
DEPRECATED RDW RBC AUTO: 46 FL (ref 37–54)
EOSINOPHIL # BLD AUTO: 0.17 10*3/MM3 (ref 0–0.4)
EOSINOPHIL NFR BLD AUTO: 2.1 % (ref 0.3–6.2)
ERYTHROCYTE [DISTWIDTH] IN BLOOD BY AUTOMATED COUNT: 15.2 % (ref 12.3–15.4)
GFR SERPL CREATININE-BSD FRML MDRD: 63 ML/MIN/1.73
GLOBULIN UR ELPH-MCNC: 3 GM/DL
GLUCOSE SERPL-MCNC: 395 MG/DL (ref 65–99)
HCT VFR BLD AUTO: 39 % (ref 34–46.6)
HGB BLD-MCNC: 12.7 G/DL (ref 12–15.9)
IMM GRANULOCYTES # BLD AUTO: 0.02 10*3/MM3 (ref 0–0.05)
IMM GRANULOCYTES NFR BLD AUTO: 0.2 % (ref 0–0.5)
LYMPHOCYTES # BLD AUTO: 2.04 10*3/MM3 (ref 0.7–3.1)
LYMPHOCYTES NFR BLD AUTO: 25.1 % (ref 19.6–45.3)
MCH RBC QN AUTO: 27.4 PG (ref 26.6–33)
MCHC RBC AUTO-ENTMCNC: 32.6 G/DL (ref 31.5–35.7)
MCV RBC AUTO: 84.1 FL (ref 79–97)
MONOCYTES # BLD AUTO: 0.44 10*3/MM3 (ref 0.1–0.9)
MONOCYTES NFR BLD AUTO: 5.4 % (ref 5–12)
NEUTROPHILS NFR BLD AUTO: 5.43 10*3/MM3 (ref 1.7–7)
NEUTROPHILS NFR BLD AUTO: 66.7 % (ref 42.7–76)
NRBC BLD AUTO-RTO: 0 /100 WBC (ref 0–0.2)
PLAT MORPH BLD: NORMAL
PLATELET # BLD AUTO: 190 10*3/MM3 (ref 140–450)
PMV BLD AUTO: 13.2 FL (ref 6–12)
POTASSIUM SERPL-SCNC: 4.8 MMOL/L (ref 3.5–5.2)
PROT SERPL-MCNC: 6.9 G/DL (ref 6–8.5)
RBC # BLD AUTO: 4.64 10*6/MM3 (ref 3.77–5.28)
SODIUM SERPL-SCNC: 134 MMOL/L (ref 136–145)
WBC MORPH BLD: NORMAL
WBC NRBC COR # BLD: 8.14 10*3/MM3 (ref 3.4–10.8)

## 2021-12-01 PROCEDURE — 82043 UR ALBUMIN QUANTITATIVE: CPT | Performed by: INTERNAL MEDICINE

## 2021-12-01 PROCEDURE — 82570 ASSAY OF URINE CREATININE: CPT | Performed by: INTERNAL MEDICINE

## 2021-12-01 PROCEDURE — 36415 COLL VENOUS BLD VENIPUNCTURE: CPT | Performed by: INTERNAL MEDICINE

## 2021-12-01 PROCEDURE — 85007 BL SMEAR W/DIFF WBC COUNT: CPT | Performed by: INTERNAL MEDICINE

## 2021-12-01 PROCEDURE — 80061 LIPID PANEL: CPT | Performed by: INTERNAL MEDICINE

## 2021-12-01 PROCEDURE — 82607 VITAMIN B-12: CPT | Performed by: INTERNAL MEDICINE

## 2021-12-01 PROCEDURE — 82306 VITAMIN D 25 HYDROXY: CPT | Performed by: INTERNAL MEDICINE

## 2021-12-01 PROCEDURE — 80053 COMPREHEN METABOLIC PANEL: CPT | Performed by: INTERNAL MEDICINE

## 2021-12-01 PROCEDURE — 99214 OFFICE O/P EST MOD 30 MIN: CPT | Performed by: INTERNAL MEDICINE

## 2021-12-01 PROCEDURE — 85025 COMPLETE CBC W/AUTO DIFF WBC: CPT | Performed by: INTERNAL MEDICINE

## 2021-12-01 PROCEDURE — 83036 HEMOGLOBIN GLYCOSYLATED A1C: CPT | Performed by: INTERNAL MEDICINE

## 2021-12-01 PROCEDURE — 84443 ASSAY THYROID STIM HORMONE: CPT | Performed by: INTERNAL MEDICINE

## 2021-12-01 NOTE — PROGRESS NOTES
"    Subjective    Lucila LOPEZ Julio is a 51 y.o. female. she is here today for follow-up of T2DM    HPI     T2DM     Duration since 2010      Complications - retinopathy     July 2018 , had Lap Band Surgery but will need to take out due to nausea that preceded trulicity      Aggravating Factors :  Gets steroid injections for right shoulder pain , not lately       Current diet  Admits to high carb intake but trying to limit      Current exercise walking     Current monitoring regimen: home blood tests - up to 12 x daily - w  sohan    Lab Results   Component Value Date    HGBA1C 8.7 03/05/2021        Home blood sugar records:      Sohan for the past 2 weeks   , hasn't been able to use reader. Now we will use the vicente      PE    /60   Pulse 86   Ht 170.2 cm (67\")   Wt 113 kg (250 lb)   SpO2 98%   BMI 39.16 kg/m²   AOx3  No visible goiter  RRR  CTA  No Edema    Labs    No results found for: WBC, HGB, HCT, MCV, PLT  Lab Results   Component Value Date    GLUCOSE 216 (H) 07/09/2019    BUN 9 07/09/2019    CREATININE 0.7 07/09/2019    EGFRIFNONA >60 07/09/2019    K 3.7 07/09/2019    CO2 27 07/09/2019    CALCIUM 8.7 07/09/2019    ALBUMIN 4.1 07/09/2019    AST 20 07/09/2019    ALT 23 07/09/2019            Lab Review      Assessment/Plan      1. Well controlled type 2 diabetes mellitus (HCC)    2. Essential hypertension    3. Dyslipidemia    .    T2DM       Glycemic Management:      Lab Results   Component Value Date    HGBA1C 8.7 03/05/2021            Sohan 2 weeks reviewed , well controlled type 2 DM    Sohan, just restarted       Toujeo taking 100 units am - 80 back off to 56   Now basal 48 units per hour on pump           ===========================================     synjardy 5/1000 mg, 2 tabs w bkfast , much less GMI      ===========================================     Trulicity  1.5 mg weekly - increased to 3 mg weekly - 4.5 mg weekly        ===========================================     omnipod   2 u per " "hour   Carb ratio 8                Lipid Management   crestor 10 mg qhs  March 2021,    Blood Pressure Management:     /60   Pulse 86   Ht 170.2 cm (67\")   Wt 113 kg (250 lb)   SpO2 98%   BMI 39.16 kg/m²        On lisinopril 10 mg daily         Microvascular Complication Monitoring:       Eye Exam Evaluation, within 1 year   no retinopathy   -----------     Last Microalbumin-Proteinuria Assessment     No results found for: MALBCRERATIO     No results found for: UTPCR   gfr more than 60 and no protein in March 2021   -----------        Neuropathy, none              Weight Related:      Patient's Body mass index is 39.16 kg/m². BMI is above normal parameters. Recommendations include: educational material.     Rule out Cushing's --- weigh gain, rounding facies, lose of muscle tone in legs --ruled out with dex. Stim test     Will collect salivary time 3 at 8 am and 11 pm --no results      Dexamethasone suppression         Component      Latest Ref Rng & Units 4/12/2019   Cortisol - AM      6.2 - 19.4 ug/dL 0.7 (L)   Dexamethasone, Serum      ng/dL 171         Normal response to the dex. Stimulation test            Bone Health     vitamin d def.      Reassess      Thyroid Health     No results found for: TSH                   Other Diabetes Related Aspects         No results found for: [id number]                 4. Follow-up: No follow-ups on file.     Tricia wilkinson   "

## 2021-12-02 LAB
25(OH)D3 SERPL-MCNC: 66 NG/ML (ref 30–100)
ALBUMIN UR-MCNC: 5.3 MG/DL
CHOLEST SERPL-MCNC: 144 MG/DL (ref 0–200)
CREAT UR-MCNC: 182.4 MG/DL
HBA1C MFR BLD: 9.66 % (ref 4.8–5.6)
HDLC SERPL-MCNC: 46 MG/DL (ref 40–60)
LDLC SERPL CALC-MCNC: 66 MG/DL (ref 0–100)
LDLC/HDLC SERPL: 1.3 {RATIO}
MICROALBUMIN/CREAT UR: 29.1 MG/G
TRIGL SERPL-MCNC: 190 MG/DL (ref 0–150)
TSH SERPL DL<=0.05 MIU/L-ACNC: 1.77 UIU/ML (ref 0.27–4.2)
VIT B12 BLD-MCNC: 530 PG/ML (ref 211–946)
VLDLC SERPL-MCNC: 32 MG/DL (ref 5–40)

## 2021-12-07 ENCOUNTER — DOCUMENTATION (OUTPATIENT)
Dept: ENDOCRINOLOGY | Facility: CLINIC | Age: 51
End: 2021-12-07

## 2021-12-16 RX ORDER — FLUCONAZOLE 150 MG/1
TABLET ORAL
Qty: 4 TABLET | Refills: 8 | Status: SHIPPED | OUTPATIENT
Start: 2021-12-16 | End: 2023-01-05

## 2022-02-17 RX ORDER — EMPAGLIFLOZIN, METFORMIN HYDROCHLORIDE 5; 1000 MG/1; MG/1
2 TABLET, EXTENDED RELEASE ORAL
Qty: 60 TABLET | Refills: 11 | Status: SHIPPED | OUTPATIENT
Start: 2022-02-17 | End: 2022-03-15 | Stop reason: SDUPTHER

## 2022-03-02 ENCOUNTER — TELEMEDICINE (OUTPATIENT)
Dept: ENDOCRINOLOGY | Facility: CLINIC | Age: 52
End: 2022-03-02

## 2022-03-02 DIAGNOSIS — E11.65 TYPE 2 DIABETES MELLITUS WITH HYPERGLYCEMIA, WITH LONG-TERM CURRENT USE OF INSULIN: Primary | ICD-10-CM

## 2022-03-02 DIAGNOSIS — Z79.4 TYPE 2 DIABETES MELLITUS WITH HYPERGLYCEMIA, WITH LONG-TERM CURRENT USE OF INSULIN: Primary | ICD-10-CM

## 2022-03-02 DIAGNOSIS — I10 ESSENTIAL HYPERTENSION: ICD-10-CM

## 2022-03-02 DIAGNOSIS — E78.5 DYSLIPIDEMIA: ICD-10-CM

## 2022-03-02 DIAGNOSIS — E55.9 VITAMIN D DEFICIENCY: ICD-10-CM

## 2022-03-02 PROCEDURE — 99214 OFFICE O/P EST MOD 30 MIN: CPT | Performed by: INTERNAL MEDICINE

## 2022-03-02 PROCEDURE — 95251 CONT GLUC MNTR ANALYSIS I&R: CPT | Performed by: INTERNAL MEDICINE

## 2022-03-02 NOTE — PROGRESS NOTES
This was a Telehealth Encounter. Benefits and Disadvantages of a Telehealth Visit were discussed and accepted by patient. .  Patient agreed to receive service through Telehealth visit as patient is being compliant with social distancing recommendations imparted by CDC.     You have chosen to receive care through a telehealth visit.  Do you consent to use a video/audio connection for your medical care today? Yes        Cristian Cardonamadi Kim is a 51 y.o. female. she is here today for follow-up of T2DM    HPI     T2DM     Duration since 2010      Complications - retinopathy     July 2018 , had Lap Band Surgery but will need to take out due to nausea that preceded trulicity      Aggravating Factors :  Gets steroid injections for right shoulder pain , not lately       Current diet  Admits to high carb intake but trying to limit      Current exercise walking     Current monitoring regimen: home blood tests - up to 12 x daily - w  sohan    Lab Results   Component Value Date    HGBA1C 9.66 (H) 12/01/2021        Home blood sugar records:      Using sohan but falling     PE    There were no vitals taken for this visit.  AOx3  No visible goiter  RRR  CTA  No Edema    Labs    Lab Results   Component Value Date    WBC 8.14 12/01/2021    HGB 12.7 12/01/2021    HCT 39.0 12/01/2021    MCV 84.1 12/01/2021     12/01/2021     Lab Results   Component Value Date    GLUCOSE 395 (H) 12/01/2021    BUN 12 12/01/2021    CREATININE 0.93 12/01/2021    EGFRIFNONA 63 12/01/2021    BCR 12.9 12/01/2021    K 4.8 12/01/2021    CO2 24.0 12/01/2021    CALCIUM 9.0 12/01/2021    ALBUMIN 3.90 12/01/2021    AST 18 12/01/2021    ALT 19 12/01/2021            Lab Review      Assessment/Plan      1. Well controlled type 2 diabetes mellitus (HCC)    2. Essential hypertension    3. Dyslipidemia    4. Vitamin D deficiency    .    T2DM       Glycemic Management:      Lab Results   Component Value Date    HGBA1C  9.66 (H) 12/01/2021            Taiwo 2 weeks reviewed , type 2 diabetes w hyperglycemia               ===========================================     synjardy 5/1000 mg, 2 tabs w bkfast , much less GMI      ===========================================     Trulicity  1.5 mg weekly - increased to 3 mg weekly - 4.5 mg weekly        ===========================================     omnipod   2 u per hour , change to 2.2   Carb ratio 8  - change to 6    Correction 35 - 25              Lipid Management   crestor 10 mg qhs    Blood Pressure Management:     There were no vitals taken for this visit.       On lisinopril 10 mg daily         Microvascular Complication Monitoring:       Eye Exam Evaluation, within 1 year   no retinopathy   -----------     Last Microalbumin-Proteinuria Assessment     No results found for: MALBCRERATIO     No results found for: UTPCR   gfr more than 60 and no protein in March 2021   -----------        Neuropathy, none              Weight Related:      Patient's There is no height or weight on file to calculate BMI. BMI is above normal parameters. Recommendations include: educational material.     Rule out Cushing's --- weigh gain, rounding facies, lose of muscle tone in legs --ruled out with dex. Stim test     Will collect salivary time 3 at 8 am and 11 pm --no results      Dexamethasone suppression         Component      Latest Ref Rng & Units 4/12/2019   Cortisol - AM      6.2 - 19.4 ug/dL 0.7 (L)   Dexamethasone, Serum      ng/dL 171         Normal response to the dex. Stimulation test            Bone Health     vitamin d def.      Reassess      Thyroid Health       Lab Results   Component Value Date    TSH 1.770 12/01/2021                      Other Diabetes Related Aspects         No results found for: [id number]                 4. Follow-up: No follow-ups on file.     Tricia wilkinson

## 2022-03-15 ENCOUNTER — TELEPHONE (OUTPATIENT)
Dept: ENDOCRINOLOGY | Facility: CLINIC | Age: 52
End: 2022-03-15

## 2022-03-15 DIAGNOSIS — E11.65 TYPE 2 DIABETES MELLITUS WITH HYPERGLYCEMIA, WITH LONG-TERM CURRENT USE OF INSULIN: Primary | ICD-10-CM

## 2022-03-15 DIAGNOSIS — Z79.4 TYPE 2 DIABETES MELLITUS WITH HYPERGLYCEMIA, WITH LONG-TERM CURRENT USE OF INSULIN: Primary | ICD-10-CM

## 2022-03-15 RX ORDER — EMPAGLIFLOZIN, METFORMIN HYDROCHLORIDE 5; 1000 MG/1; MG/1
2 TABLET, EXTENDED RELEASE ORAL
Qty: 60 TABLET | Refills: 11 | Status: SHIPPED | OUTPATIENT
Start: 2022-03-15 | End: 2022-09-28 | Stop reason: SDUPTHER

## 2022-03-15 RX ORDER — ROSUVASTATIN CALCIUM 10 MG/1
10 TABLET, COATED ORAL NIGHTLY
Qty: 30 TABLET | Refills: 11 | Status: SHIPPED | OUTPATIENT
Start: 2022-03-15 | End: 2023-03-31

## 2022-03-15 NOTE — TELEPHONE ENCOUNTER
Pt needs synjardy and rosucastatin sent to [facility] in [facility]. Thank you

## 2022-05-31 ENCOUNTER — TELEPHONE (OUTPATIENT)
Dept: ENDOCRINOLOGY | Facility: CLINIC | Age: 52
End: 2022-05-31

## 2022-05-31 NOTE — TELEPHONE ENCOUNTER
Pt needs refills on Omnipod sent to [facility]. Pt is also inquiring about glue to help Omnipod stick, she says she cannot find it anywhere OTC.         Thanks

## 2022-06-01 DIAGNOSIS — Z79.4 TYPE 2 DIABETES MELLITUS WITH HYPERGLYCEMIA, WITH LONG-TERM CURRENT USE OF INSULIN: Primary | ICD-10-CM

## 2022-06-01 DIAGNOSIS — E11.65 TYPE 2 DIABETES MELLITUS WITH HYPERGLYCEMIA, WITH LONG-TERM CURRENT USE OF INSULIN: Primary | ICD-10-CM

## 2022-06-01 RX ORDER — INSULIN PUMP CONTROLLER
1 EACH MISCELLANEOUS
Qty: 2 EACH | Refills: 3 | Status: SHIPPED | OUTPATIENT
Start: 2022-06-01

## 2022-06-14 ENCOUNTER — OFFICE VISIT (OUTPATIENT)
Dept: ENDOCRINOLOGY | Facility: CLINIC | Age: 52
End: 2022-06-14

## 2022-06-14 VITALS
HEIGHT: 67 IN | DIASTOLIC BLOOD PRESSURE: 80 MMHG | HEART RATE: 84 BPM | SYSTOLIC BLOOD PRESSURE: 130 MMHG | WEIGHT: 250 LBS | BODY MASS INDEX: 39.24 KG/M2 | OXYGEN SATURATION: 96 %

## 2022-06-14 DIAGNOSIS — I10 ESSENTIAL HYPERTENSION: ICD-10-CM

## 2022-06-14 DIAGNOSIS — E11.65 TYPE 2 DIABETES MELLITUS WITH HYPERGLYCEMIA, WITH LONG-TERM CURRENT USE OF INSULIN: Primary | ICD-10-CM

## 2022-06-14 DIAGNOSIS — E55.9 VITAMIN D DEFICIENCY: ICD-10-CM

## 2022-06-14 DIAGNOSIS — Z79.4 TYPE 2 DIABETES MELLITUS WITH HYPERGLYCEMIA, WITH LONG-TERM CURRENT USE OF INSULIN: Primary | ICD-10-CM

## 2022-06-14 DIAGNOSIS — E78.5 DYSLIPIDEMIA: ICD-10-CM

## 2022-06-14 LAB
EXPIRATION DATE: ABNORMAL
HBA1C MFR BLD: 9 %
Lab: ABNORMAL

## 2022-06-14 PROCEDURE — 3046F HEMOGLOBIN A1C LEVEL >9.0%: CPT | Performed by: INTERNAL MEDICINE

## 2022-06-14 PROCEDURE — 99214 OFFICE O/P EST MOD 30 MIN: CPT | Performed by: INTERNAL MEDICINE

## 2022-06-14 PROCEDURE — 83036 HEMOGLOBIN GLYCOSYLATED A1C: CPT | Performed by: INTERNAL MEDICINE

## 2022-06-14 NOTE — PROGRESS NOTES
"                                  Mode of Visit: Video  Location of patient: Home  You have chosen to receive care through a telehealth visit.  Does the patient consent to use a video/audio connection for your medical care today? Yes  The visit included audio and video interaction. No technical issues occurred during this visit        Cristian Kim is a 52 y.o. female. she is here today for follow-up of T2DM    HPI     T2DM     Duration since 2010      Complications - retinopathy     July 2018 , had Lap Band Surgery but will need to take out due to nausea that preceded trulicity      Aggravating Factors :  Gets steroid injections for right shoulder pain , not lately       Current diet  Admits to high carb intake but trying to limit      Current exercise walking     Current monitoring regimen: home blood tests - up to 12 x daily - w  sohan    Lab Results   Component Value Date    HGBA1C 9.66 (H) 12/01/2021        Home blood sugar records:      Using sohan but falling     PE    /80   Pulse 84   Ht 170.2 cm (67\")   Wt 113 kg (250 lb)   SpO2 96%   BMI 39.16 kg/m²   AOx3  No visible goiter  RRR  CTA  No Edema    Labs    Lab Results   Component Value Date    WBC 8.14 12/01/2021    HGB 12.7 12/01/2021    HCT 39.0 12/01/2021    MCV 84.1 12/01/2021     12/01/2021     Lab Results   Component Value Date    GLUCOSE 395 (H) 12/01/2021    BUN 12 12/01/2021    CREATININE 0.93 12/01/2021    EGFRIFNONA 63 12/01/2021    BCR 12.9 12/01/2021    K 4.8 12/01/2021    CO2 24.0 12/01/2021    CALCIUM 9.0 12/01/2021    ALBUMIN 3.90 12/01/2021    AST 18 12/01/2021    ALT 19 12/01/2021            Lab Review      Assessment & Plan      1. Type 2 diabetes mellitus with hyperglycemia, with long-term current use of insulin (HCC)    2. Essential hypertension    3. Dyslipidemia    4. Vitamin D deficiency    .    T2DM       Glycemic Management:      Lab Results   Component Value Date    HGBA1C 9.66 (H) 12/01/2021 " "           Taiwo 2 weeks , keeps falling off              ===========================================     synjardy 5/1000 mg, 2 tabs w bkfast , much less GMI      ===========================================     Trulicity  1.5 mg weekly - increased to 3 mg weekly - 4.5 mg weekly        ===========================================     omnipod   2 u per hour , change to 2.2   Carb ratio 8  - change to 6    Correction 35 - 25              Lipid Management   crestor 10 mg qhs  Elevated, but missing at night   Take to am   Blood Pressure Management:     /80   Pulse 84   Ht 170.2 cm (67\")   Wt 113 kg (250 lb)   SpO2 96%   BMI 39.16 kg/m²        On lisinopril 10 mg daily         Microvascular Complication Monitoring:       Eye Exam Evaluation, within 1 year   no retinopathy   -----------     Last Microalbumin-Proteinuria Assessment     No results found for: MALBCRERATIO     No results found for: UTPCR   gfr more than 60 and no protein in March 2021   -----------        Neuropathy, none              Weight Related:      Patient's Body mass index is 39.16 kg/m². BMI is above normal parameters. Recommendations include: educational material.     Rule out Cushing's --- weigh gain, rounding facies, lose of muscle tone in legs --ruled out with dex. Stim test     Will collect salivary time 3 at 8 am and 11 pm --no results      Dexamethasone suppression         Component      Latest Ref Rng & Units 4/12/2019   Cortisol - AM      6.2 - 19.4 ug/dL 0.7 (L)   Dexamethasone, Serum      ng/dL 171         Normal response to the dex. Stimulation test            Bone Health     vitamin d def.      Reassess      Thyroid Health       Lab Results   Component Value Date    TSH 1.770 12/01/2021                      Other Diabetes Related Aspects         No results found for: [id number]                 4. Follow-up: No follow-ups on file.     Tricia wilkinson           This document has been electronically signed by Parker Paiz" MD Abel on September 24, 2022 04:55 CDT

## 2022-07-25 ENCOUNTER — APPOINTMENT (OUTPATIENT)
Dept: PREADMISSION TESTING | Facility: HOSPITAL | Age: 52
End: 2022-07-25

## 2022-07-26 ENCOUNTER — PRE-ADMISSION TESTING (OUTPATIENT)
Dept: PREADMISSION TESTING | Facility: HOSPITAL | Age: 52
End: 2022-07-26

## 2022-07-26 ENCOUNTER — HOSPITAL ENCOUNTER (OUTPATIENT)
Dept: GENERAL RADIOLOGY | Facility: HOSPITAL | Age: 52
Discharge: HOME | End: 2022-07-26

## 2022-07-26 VITALS
OXYGEN SATURATION: 97 % | SYSTOLIC BLOOD PRESSURE: 130 MMHG | HEART RATE: 87 BPM | WEIGHT: 240.74 LBS | BODY MASS INDEX: 38.69 KG/M2 | RESPIRATION RATE: 18 BRPM | DIASTOLIC BLOOD PRESSURE: 78 MMHG | HEIGHT: 66 IN

## 2022-07-26 LAB
ALBUMIN SERPL-MCNC: 4.3 G/DL (ref 3.5–5.2)
ALBUMIN/GLOB SERPL: 1.5 G/DL
ALP SERPL-CCNC: 94 U/L (ref 39–117)
ALT SERPL W P-5'-P-CCNC: 15 U/L (ref 1–33)
ANION GAP SERPL CALCULATED.3IONS-SCNC: 12 MMOL/L (ref 5–15)
APTT PPP: 23.2 SECONDS (ref 24.1–35)
AST SERPL-CCNC: 13 U/L (ref 1–32)
BILIRUB SERPL-MCNC: 0.3 MG/DL (ref 0–1.2)
BILIRUB UR QL STRIP: NEGATIVE
BUN SERPL-MCNC: 14 MG/DL (ref 6–20)
BUN/CREAT SERPL: 16.5 (ref 7–25)
CALCIUM SPEC-SCNC: 9.3 MG/DL (ref 8.6–10.5)
CHLORIDE SERPL-SCNC: 99 MMOL/L (ref 98–107)
CLARITY UR: CLEAR
CO2 SERPL-SCNC: 22 MMOL/L (ref 22–29)
COLOR UR: YELLOW
CREAT SERPL-MCNC: 0.85 MG/DL (ref 0.57–1)
DEPRECATED RDW RBC AUTO: 44.8 FL (ref 37–54)
EGFRCR SERPLBLD CKD-EPI 2021: 82 ML/MIN/1.73
ERYTHROCYTE [DISTWIDTH] IN BLOOD BY AUTOMATED COUNT: 14.9 % (ref 12.3–15.4)
GLOBULIN UR ELPH-MCNC: 2.8 GM/DL
GLUCOSE SERPL-MCNC: 489 MG/DL (ref 65–99)
GLUCOSE UR STRIP-MCNC: ABNORMAL MG/DL
HCT VFR BLD AUTO: 39.3 % (ref 34–46.6)
HGB BLD-MCNC: 13.1 G/DL (ref 12–15.9)
HGB UR QL STRIP.AUTO: NEGATIVE
INR PPP: 1 (ref 0.91–1.09)
KETONES UR QL STRIP: NEGATIVE
LEUKOCYTE ESTERASE UR QL STRIP.AUTO: NEGATIVE
MCH RBC QN AUTO: 28.1 PG (ref 26.6–33)
MCHC RBC AUTO-ENTMCNC: 33.3 G/DL (ref 31.5–35.7)
MCV RBC AUTO: 84.2 FL (ref 79–97)
NITRITE UR QL STRIP: NEGATIVE
PH UR STRIP.AUTO: 5.5 [PH] (ref 5–8)
PLATELET # BLD AUTO: 159 10*3/MM3 (ref 140–450)
PMV BLD AUTO: 13.4 FL (ref 6–12)
POTASSIUM SERPL-SCNC: 4.4 MMOL/L (ref 3.5–5.2)
PROT SERPL-MCNC: 7.1 G/DL (ref 6–8.5)
PROT UR QL STRIP: NEGATIVE
PROTHROMBIN TIME: 12.8 SECONDS (ref 11.9–14.6)
RBC # BLD AUTO: 4.67 10*6/MM3 (ref 3.77–5.28)
SODIUM SERPL-SCNC: 133 MMOL/L (ref 136–145)
SP GR UR STRIP: >1.03 (ref 1–1.03)
UROBILINOGEN UR QL STRIP: ABNORMAL
WBC NRBC COR # BLD: 8.13 10*3/MM3 (ref 3.4–10.8)

## 2022-07-26 PROCEDURE — 85730 THROMBOPLASTIN TIME PARTIAL: CPT

## 2022-07-26 PROCEDURE — 36415 COLL VENOUS BLD VENIPUNCTURE: CPT

## 2022-07-26 PROCEDURE — 71045 X-RAY EXAM CHEST 1 VIEW: CPT

## 2022-07-26 PROCEDURE — 93010 ELECTROCARDIOGRAM REPORT: CPT | Performed by: INTERNAL MEDICINE

## 2022-07-26 PROCEDURE — 85027 COMPLETE CBC AUTOMATED: CPT

## 2022-07-26 PROCEDURE — 80053 COMPREHEN METABOLIC PANEL: CPT

## 2022-07-26 PROCEDURE — 93005 ELECTROCARDIOGRAM TRACING: CPT

## 2022-07-26 PROCEDURE — 81003 URINALYSIS AUTO W/O SCOPE: CPT

## 2022-07-26 PROCEDURE — 85610 PROTHROMBIN TIME: CPT

## 2022-07-26 RX ORDER — CHOLECALCIFEROL (VITAMIN D3) 125 MCG
5 CAPSULE ORAL NIGHTLY
COMMUNITY

## 2022-07-26 RX ORDER — INSULIN LISPRO 100 [IU]/ML
INJECTION, SOLUTION INTRAVENOUS; SUBCUTANEOUS
Qty: 30 ML | Refills: 3 | Status: SHIPPED | OUTPATIENT
Start: 2022-07-26 | End: 2022-12-27

## 2022-07-26 NOTE — DISCHARGE INSTRUCTIONS
Before you come to the hospital        Arrival time: AS DIRECTED BY OFFICE     YOU MAY TAKE THE FOLLOWING MEDICATION(S) THE MORNING OF SURGERY WITH A SIP OF WATER: ***   Please take your percocet and lyrica the morning of surgery  Hold all other meds the morning of surgery             ALL OTHER HOME MEDICATION CHECK WITH YOUR PHYSICIAN (especially if you are taking diabetes medicines or blood thinners)    Do not take any Erectile Dysfunction medications (EX: CIALIS, VIAGRA) 24 hours prior to surgery.      If you were given and instructed to use a germ- killing soap, use as directed the night before surgery and the morning of surgery before coming to the hospital.             Eating and drinking restrictions prior to scheduled arrival time    2 Hours before arrival time STOP   Drinking Clear liquids (water, apple juice-no pulp)     6 Hours before arrival time STOP   Milk or drinks that contain milk, full liquids    6 Hours before arrival time STOP   Light meals or foods, such as toast or cereal    8 Hours before arrival time STOP   Heavy foods, such as meat, fried foods, or fatty foods    (It is extremely important that you follow these guidelines to prevent delay or cancelation of your procedure)     Clear Liquids  Water and flavored water                                                                      Clear Fruit juices, such as cranberry juice and apple juice.  Black coffee (NO cream of any kind, including powdered).  Plain tea  Clear bouillon or broth.  Flavored gelatin.  Soda.  Gatorade or Powerade.  Full liquid examples  Juices that have pulp.  Frozen ice pops that contain fruit pieces.  Coffee with creamer  Milk.  Yogurt.                MANAGING PAIN AFTER SURGERY    We know you are probably wondering what your pain will be like after surgery.  Following surgery it is unrealistic to expect you will not have pain.   Pain is how our bodies let us know that something is wrong or cautions us to be careful.   That said, our goal is to make your pain tolerable.    Methods we may use to treat your pain include (oral or IV medications, PCAs, epidurals, nerve blocks, etc.)   While some procedures require IV pain medications for a short time after surgery, transitioning to pain medications by mouth allows for better management of pain.   Your nurse will encourage you to take oral pain medications whenever possible.  IV medications work almost immediately, but only last a short while.  Taking medications by mouth allows for a more constant level of medication in your blood stream for a longer period of time.      Once your pain is out of control it is harder to get back under control.  It is important you are aware when your next dose of pain medication is due.  If you are admitted, your nurse may write the time of your next dose on the white board in your room to help you remember.      We are interested in your pain and encourage you to inform us about aggravating factors during your visit.   Many times a simple repositioning every few hours can make a big difference.    If your physician says it is okay, do not let your pain prevent you from getting out of bed. Be sure to call your nurse for assistance prior to getting up so you do not fall.      Before surgery, please decide your tolerable pain goal.  These faces help describe the pain ratings we use on a 0-10 scale.   Be prepared to tell us your goal and whether or not you take pain or anxiety medications at home.          Preparing for Surgery  Preparing for surgery is an important part of your care. It can make things go more smoothly and help you avoid complications. The steps leading up to surgery may vary among hospitals. Follow all instructions given to you by your health care providers. Ask questions if you do not understand something. Talk about any concerns that you have.  Here are some questions to consider asking before your surgery:  If my surgery is not an  emergency (is elective), when would be the best time to have the surgery?  What arrangements do I need to make for work, home, or school?  What will my recovery be like? How long will it be before I can return to normal activities?  Will I need to prepare my home? Will I need to arrange care for me or my children?  Should I expect to have pain after surgery? What are my pain management options? Are there nonmedical options that I can try for pain?  Tell a health care provider about:  Any allergies you have.  All medicines you are taking, including vitamins, herbs, eye drops, creams, and over-the-counter medicines.  Any problems you or family members have had with anesthetic medicines.  Any blood disorders you have.  Any surgeries you have had.  Any medical conditions you have.  Whether you are pregnant or may be pregnant.  What are the risks?  The risks and complications of surgery depend on the specific procedure that you have. Discuss all the risks with your health care providers before your surgery. Ask about common surgical complications, which may include:  Infection.  Bleeding or a need for blood replacement (transfusion).  Allergic reactions to medicines.  Damage to surrounding nerves, tissues, or structures.  A blood clot.  Scarring.  Failure of the surgery to correct the problem.  Follow these instructions before the procedure:  Several days or weeks before your procedure  You may have a physical exam by your primary health care provider to make sure it is safe for you to have surgery.  You may have testing. This may include a chest X-ray, blood and urine tests, electrocardiogram (ECG), or other testing.  Ask your health care provider about:  Changing or stopping your regular medicines. This is especially important if you are taking diabetes medicines or blood thinners.  Taking medicines such as aspirin and ibuprofen. These medicines can thin your blood. Do not take these medicines unless your health care  provider tells you to take them.  Taking over-the-counter medicines, vitamins, herbs, and supplements.  Do not use any products that contain nicotine or tobacco, such as cigarettes and e-cigarettes. If you need help quitting, ask your health care provider.  Avoid alcohol.  Ask your health care provider if there are exercises you can do to prepare for surgery.  Eat a healthy diet.   Plan to have someone take you home from the hospital or clinic.  Plan to have a responsible adult care for you for at least 24 hours after you leave the hospital or clinic. This is important.  The day before your procedure  You may be given antibiotic medicine to take by mouth to help prevent infection. Take it as told by your health care provider.  You may be asked to shower with a germ-killing soap.  Follow instructions from your health care provider about eating and drinking restrictions. This includes gum, mints and hard candy.  Pack comfortable clothes according to your procedure.   The day of your procedure  You may need to take another shower with a germ-killing soap before you leave home in the morning.  With a small sip of water, take only the medicines that you are told to take.  Remove all jewelry including rings.   Leave anything you consider valuable at home except hearing aids if needed.  Do not wear any makeup, nail polish, powder, deodorant, lotion, hair accessories, or anything on your skin or body except your clothes.  If you will be staying in the hospital, bring a case to hold your glasses, contacts, or dentures. You may also want to bring your robe and non-skid footwear.  If you wear oxygen at home, bring it with you the day of surgery.  If instructed by your health care provider, bring your sleep apnea device with you on the day of your surgery (if this applies to you).  You may want to leave your suitcase and sleep apnea device in the car until after surgery.   Arrive at the hospital as scheduled.  Bring a friend or  family member with you who can help to answer questions and be present while you meet with your health care provider.  At the hospital  When you arrive at the hospital:  Go to registration located at the main entrance of the hospital. You will be registered and given a beeper and a sheet of name stickers. Take the stickers to the Outpatient nurses desk and place in the black tray. This is to notify staff that you have arrived. Then return to the lobby to wait.   When your beeper lights up and vibrates proceed through the double doors, under the stairs, and a member of the Outpatient Surgery staff will escort you to your preoperative room.  You may have to wear compression sleeves. These help to prevent blood clots and reduce swelling in your legs.  An IV may be inserted into one of your veins.              In the operating room, you may be given one or more of the following:        A medicine to help you relax (sedative).        A medicine to numb the area (local anesthetic).        A medicine to make you fall asleep (general anesthetic).        A medicine that is injected into an area of your body to numb everything below the                      injection site (regional anesthetic).  You may be given an antibiotic through your IV to help prevent infection.  Your surgical site will be marked or identified.    Contact a health care provider if you:  Develop a fever of more than 100.4°F (38°C) or other feelings of illness during the 48 hours before your surgery.  Have symptoms that get worse.  Have questions or concerns about your surgery.  Summary  Preparing for surgery can make the procedure go more smoothly and lower your risk of complications.  Before surgery, make a list of questions and concerns to discuss with your surgeon. Ask about the risks and possible complications.  In the days or weeks before your surgery, follow all instructions from your health care provider. You may need to stop smoking, avoid  alcohol, follow eating restrictions, and change or stop your regular medicines.  Contact your surgeon if you develop a fever or other signs of illness during the few days before your surgery.  This information is not intended to replace advice given to you by your health care provider. Make sure you discuss any questions you have with your health care provider.  Document Revised: 12/21/2018 Document Reviewed: 10/23/2018  Elsevier Patient Education © 2021 [employer]

## 2022-07-27 LAB
QT INTERVAL: 386 MS
QTC INTERVAL: 445 MS

## 2022-07-28 ENCOUNTER — DOCUMENTATION (OUTPATIENT)
Dept: ENDOCRINOLOGY | Facility: CLINIC | Age: 52
End: 2022-07-28

## 2022-07-28 NOTE — PROGRESS NOTES
CHART NOTES FAXED TO [facility] @ [phone]    CONFIRMATION RECEIVED  SENT TO MED REC

## 2022-08-11 ENCOUNTER — TRANSCRIBE ORDERS (OUTPATIENT)
Dept: LAB | Facility: HOSPITAL | Age: 52
End: 2022-08-11

## 2022-08-11 DIAGNOSIS — Z11.59 SCREENING FOR VIRAL DISEASE: Primary | ICD-10-CM

## 2022-08-15 ENCOUNTER — LAB (OUTPATIENT)
Dept: LAB | Facility: HOSPITAL | Age: 52
End: 2022-08-15

## 2022-08-15 LAB — SARS-COV-2 ORF1AB RESP QL NAA+PROBE: NOT DETECTED

## 2022-08-15 PROCEDURE — U0004 COV-19 TEST NON-CDC HGH THRU: HCPCS | Performed by: ORTHOPAEDIC SURGERY

## 2022-08-15 PROCEDURE — U0005 INFEC AGEN DETEC AMPLI PROBE: HCPCS | Performed by: ORTHOPAEDIC SURGERY

## 2022-08-15 PROCEDURE — C9803 HOPD COVID-19 SPEC COLLECT: HCPCS

## 2022-08-17 ENCOUNTER — HOSPITAL ENCOUNTER (INPATIENT)
Facility: HOSPITAL | Age: 52
LOS: 3 days | Discharge: HOME | End: 2022-08-20
Attending: ORTHOPAEDIC SURGERY | Admitting: ORTHOPAEDIC SURGERY

## 2022-08-17 ENCOUNTER — APPOINTMENT (OUTPATIENT)
Dept: GENERAL RADIOLOGY | Facility: HOSPITAL | Age: 52
End: 2022-08-17

## 2022-08-17 ENCOUNTER — ANESTHESIA (OUTPATIENT)
Dept: PERIOP | Facility: HOSPITAL | Age: 52
End: 2022-08-17

## 2022-08-17 ENCOUNTER — ANESTHESIA EVENT (OUTPATIENT)
Dept: PERIOP | Facility: HOSPITAL | Age: 52
End: 2022-08-17

## 2022-08-17 DIAGNOSIS — M54.2 CERVICALGIA: ICD-10-CM

## 2022-08-17 DIAGNOSIS — Z74.09 IMPAIRED MOBILITY: ICD-10-CM

## 2022-08-17 DIAGNOSIS — M47.22 CERVICAL RADICULOPATHY DUE TO DEGENERATIVE JOINT DISEASE OF SPINE: Primary | ICD-10-CM

## 2022-08-17 DIAGNOSIS — M47.22 CERVICAL SPONDYLOSIS WITH RADICULOPATHY: ICD-10-CM

## 2022-08-17 DIAGNOSIS — Z78.9 DECREASED ACTIVITIES OF DAILY LIVING (ADL): ICD-10-CM

## 2022-08-17 PROBLEM — M50.30 DDD (DEGENERATIVE DISC DISEASE), CERVICAL: Status: ACTIVE | Noted: 2022-08-17

## 2022-08-17 LAB
ABO GROUP BLD: NORMAL
BLD GP AB SCN SERPL QL: NEGATIVE
GLUCOSE BLDC GLUCOMTR-MCNC: 124 MG/DL (ref 70–130)
GLUCOSE BLDC GLUCOMTR-MCNC: 137 MG/DL (ref 70–130)
GLUCOSE BLDC GLUCOMTR-MCNC: 216 MG/DL (ref 70–130)
RH BLD: POSITIVE
T&S EXPIRATION DATE: NORMAL

## 2022-08-17 PROCEDURE — C1713 ANCHOR/SCREW BN/BN,TIS/BN: HCPCS | Performed by: ORTHOPAEDIC SURGERY

## 2022-08-17 PROCEDURE — 0RG20A0 FUSION OF 2 OR MORE CERVICAL VERTEBRAL JOINTS WITH INTERBODY FUSION DEVICE, ANTERIOR APPROACH, ANTERIOR COLUMN, OPEN APPROACH: ICD-10-PCS | Performed by: ORTHOPAEDIC SURGERY

## 2022-08-17 PROCEDURE — 25010000002 PROPOFOL 10 MG/ML EMULSION

## 2022-08-17 PROCEDURE — 25010000002 DROPERIDOL PER 5 MG

## 2022-08-17 PROCEDURE — 25010000002 CEFAZOLIN PER 500 MG: Performed by: ORTHOPAEDIC SURGERY

## 2022-08-17 PROCEDURE — 86900 BLOOD TYPING SEROLOGIC ABO: CPT | Performed by: ORTHOPAEDIC SURGERY

## 2022-08-17 PROCEDURE — 25010000002 HYDROMORPHONE PER 4 MG: Performed by: NURSE ANESTHETIST, CERTIFIED REGISTERED

## 2022-08-17 PROCEDURE — 76000 FLUOROSCOPY <1 HR PHYS/QHP: CPT

## 2022-08-17 PROCEDURE — 25010000002 MIDAZOLAM PER 1 MG

## 2022-08-17 PROCEDURE — 86850 RBC ANTIBODY SCREEN: CPT | Performed by: ORTHOPAEDIC SURGERY

## 2022-08-17 PROCEDURE — C1889 IMPLANT/INSERT DEVICE, NOC: HCPCS | Performed by: ORTHOPAEDIC SURGERY

## 2022-08-17 PROCEDURE — 0RT30ZZ RESECTION OF CERVICAL VERTEBRAL DISC, OPEN APPROACH: ICD-10-PCS | Performed by: ORTHOPAEDIC SURGERY

## 2022-08-17 PROCEDURE — 4A11X4G MONITORING OF PERIPHERAL NERVOUS ELECTRICAL ACTIVITY, INTRAOPERATIVE, EXTERNAL APPROACH: ICD-10-PCS | Performed by: ORTHOPAEDIC SURGERY

## 2022-08-17 PROCEDURE — 94664 DEMO&/EVAL PT USE INHALER: CPT

## 2022-08-17 PROCEDURE — 72040 X-RAY EXAM NECK SPINE 2-3 VW: CPT

## 2022-08-17 PROCEDURE — 0 HYDROMORPHONE 1 MG/ML SOLUTION: Performed by: ORTHOPAEDIC SURGERY

## 2022-08-17 PROCEDURE — 25010000002 HYDROMORPHONE PER 4 MG

## 2022-08-17 PROCEDURE — 0RG2071 FUSION OF 2 OR MORE CERVICAL VERTEBRAL JOINTS WITH AUTOLOGOUS TISSUE SUBSTITUTE, POSTERIOR APPROACH, POSTERIOR COLUMN, OPEN APPROACH: ICD-10-PCS | Performed by: ORTHOPAEDIC SURGERY

## 2022-08-17 PROCEDURE — 86901 BLOOD TYPING SEROLOGIC RH(D): CPT | Performed by: ORTHOPAEDIC SURGERY

## 2022-08-17 PROCEDURE — L0174 CERV SR 2PC THOR EXT PRE OTS: HCPCS | Performed by: ORTHOPAEDIC SURGERY

## 2022-08-17 PROCEDURE — 25010000002 DEXAMETHASONE PER 1 MG: Performed by: NURSE ANESTHETIST, CERTIFIED REGISTERED

## 2022-08-17 PROCEDURE — 25010000002 FENTANYL CITRATE (PF) 250 MCG/5ML SOLUTION

## 2022-08-17 PROCEDURE — 25010000002 ONDANSETRON PER 1 MG: Performed by: NURSE ANESTHETIST, CERTIFIED REGISTERED

## 2022-08-17 PROCEDURE — 94799 UNLISTED PULMONARY SVC/PX: CPT

## 2022-08-17 PROCEDURE — 82962 GLUCOSE BLOOD TEST: CPT

## 2022-08-17 DEVICE — DBM T43105 5CC GRAFTON PUTTY
Type: IMPLANTABLE DEVICE | Site: SPINE CERVICAL | Status: FUNCTIONAL
Brand: GRAFTON®AND GRAFTON PLUS®DEMINERALIZED BONE MATRIX (DBM)

## 2022-08-17 DEVICE — IMPLANTABLE DEVICE: Type: IMPLANTABLE DEVICE | Site: SPINE CERVICAL | Status: FUNCTIONAL

## 2022-08-17 DEVICE — INSIGNIA, ACP, 3 LEVEL, 54MM
Type: IMPLANTABLE DEVICE | Site: SPINE CERVICAL | Status: FUNCTIONAL
Brand: INSIGNIA

## 2022-08-17 DEVICE — SCRW FIX POST/CERV: Type: IMPLANTABLE DEVICE | Site: SPINE CERVICAL | Status: FUNCTIONAL

## 2022-08-17 DEVICE — 4.0MM VARIABLE ANGLE, SELF-DRILLING SCREW, SINGLE LEAD 14MM
Type: IMPLANTABLE DEVICE | Site: SPINE CERVICAL | Status: FUNCTIONAL
Brand: INSIGNIA

## 2022-08-17 DEVICE — STBL POST/CERV X TI 4MM: Type: IMPLANTABLE DEVICE | Site: SPINE CERVICAL | Status: FUNCTIONAL

## 2022-08-17 DEVICE — KT HEMOST ABS SURGIFOAM PORCN 1GRAM: Type: IMPLANTABLE DEVICE | Site: SPINE CERVICAL | Status: FUNCTIONAL

## 2022-08-17 DEVICE — IMPLANTABLE DEVICE
Type: IMPLANTABLE DEVICE | Site: SPINE CERVICAL | Status: FUNCTIONAL
Brand: CORUS SPINAL SYSTEM-X

## 2022-08-17 RX ORDER — HYDROMORPHONE HCL 110MG/55ML
PATIENT CONTROLLED ANALGESIA SYRINGE INTRAVENOUS AS NEEDED
Status: DISCONTINUED | OUTPATIENT
Start: 2022-08-17 | End: 2022-08-17 | Stop reason: SURG

## 2022-08-17 RX ORDER — SODIUM CHLORIDE 0.9 % (FLUSH) 0.9 %
10 SYRINGE (ML) INJECTION AS NEEDED
Status: DISCONTINUED | OUTPATIENT
Start: 2022-08-17 | End: 2022-08-20 | Stop reason: HOSPADM

## 2022-08-17 RX ORDER — BUPIVACAINE HCL/0.9 % NACL/PF 0.1 %
2 PLASTIC BAG, INJECTION (ML) EPIDURAL ONCE
Status: COMPLETED | OUTPATIENT
Start: 2022-08-17 | End: 2022-08-17

## 2022-08-17 RX ORDER — LABETALOL HYDROCHLORIDE 5 MG/ML
5 INJECTION, SOLUTION INTRAVENOUS
Status: DISCONTINUED | OUTPATIENT
Start: 2022-08-17 | End: 2022-08-17 | Stop reason: HOSPADM

## 2022-08-17 RX ORDER — SODIUM CHLORIDE 0.9 % (FLUSH) 0.9 %
10 SYRINGE (ML) INJECTION EVERY 12 HOURS SCHEDULED
Status: DISCONTINUED | OUTPATIENT
Start: 2022-08-17 | End: 2022-08-17 | Stop reason: HOSPADM

## 2022-08-17 RX ORDER — BUPIVACAINE HCL/0.9 % NACL/PF 0.1 %
2 PLASTIC BAG, INJECTION (ML) EPIDURAL EVERY 8 HOURS
Status: COMPLETED | OUTPATIENT
Start: 2022-08-17 | End: 2022-08-18

## 2022-08-17 RX ORDER — SODIUM CHLORIDE, SODIUM LACTATE, POTASSIUM CHLORIDE, CALCIUM CHLORIDE 600; 310; 30; 20 MG/100ML; MG/100ML; MG/100ML; MG/100ML
100 INJECTION, SOLUTION INTRAVENOUS CONTINUOUS PRN
Status: DISCONTINUED | OUTPATIENT
Start: 2022-08-17 | End: 2022-08-17 | Stop reason: HOSPADM

## 2022-08-17 RX ORDER — OXYCODONE HCL 20 MG/1
20 TABLET, FILM COATED, EXTENDED RELEASE ORAL ONCE
Status: COMPLETED | OUTPATIENT
Start: 2022-08-17 | End: 2022-08-17

## 2022-08-17 RX ORDER — PROPOFOL 10 MG/ML
VIAL (ML) INTRAVENOUS AS NEEDED
Status: DISCONTINUED | OUTPATIENT
Start: 2022-08-17 | End: 2022-08-17 | Stop reason: SURG

## 2022-08-17 RX ORDER — ROSUVASTATIN CALCIUM 10 MG/1
10 TABLET, COATED ORAL NIGHTLY
Status: DISCONTINUED | OUTPATIENT
Start: 2022-08-17 | End: 2022-08-20 | Stop reason: HOSPADM

## 2022-08-17 RX ORDER — NICOTINE POLACRILEX 4 MG
15 LOZENGE BUCCAL
Status: DISCONTINUED | OUTPATIENT
Start: 2022-08-17 | End: 2022-08-20 | Stop reason: HOSPADM

## 2022-08-17 RX ORDER — ONDANSETRON 2 MG/ML
4 INJECTION INTRAMUSCULAR; INTRAVENOUS EVERY 6 HOURS PRN
Status: DISCONTINUED | OUTPATIENT
Start: 2022-08-17 | End: 2022-08-20 | Stop reason: HOSPADM

## 2022-08-17 RX ORDER — SODIUM CHLORIDE 9 MG/ML
INJECTION, SOLUTION INTRAVENOUS AS NEEDED
Status: DISCONTINUED | OUTPATIENT
Start: 2022-08-17 | End: 2022-08-17 | Stop reason: HOSPADM

## 2022-08-17 RX ORDER — ONDANSETRON 2 MG/ML
4 INJECTION INTRAMUSCULAR; INTRAVENOUS
Status: DISCONTINUED | OUTPATIENT
Start: 2022-08-17 | End: 2022-08-17 | Stop reason: HOSPADM

## 2022-08-17 RX ORDER — MAGNESIUM HYDROXIDE 1200 MG/15ML
LIQUID ORAL AS NEEDED
Status: DISCONTINUED | OUTPATIENT
Start: 2022-08-17 | End: 2022-08-17 | Stop reason: HOSPADM

## 2022-08-17 RX ORDER — OXYCODONE AND ACETAMINOPHEN 7.5; 325 MG/1; MG/1
2 TABLET ORAL EVERY 4 HOURS PRN
Status: DISCONTINUED | OUTPATIENT
Start: 2022-08-17 | End: 2022-08-20 | Stop reason: HOSPADM

## 2022-08-17 RX ORDER — MIDAZOLAM HYDROCHLORIDE 1 MG/ML
1 INJECTION INTRAMUSCULAR; INTRAVENOUS
Status: COMPLETED | OUTPATIENT
Start: 2022-08-17 | End: 2022-08-17

## 2022-08-17 RX ORDER — SODIUM CHLORIDE 0.9 % (FLUSH) 0.9 %
10 SYRINGE (ML) INJECTION AS NEEDED
Status: DISCONTINUED | OUTPATIENT
Start: 2022-08-17 | End: 2022-08-17 | Stop reason: HOSPADM

## 2022-08-17 RX ORDER — LIDOCAINE HYDROCHLORIDE 20 MG/ML
INJECTION, SOLUTION EPIDURAL; INFILTRATION; INTRACAUDAL; PERINEURAL AS NEEDED
Status: DISCONTINUED | OUTPATIENT
Start: 2022-08-17 | End: 2022-08-17 | Stop reason: SURG

## 2022-08-17 RX ORDER — ALPRAZOLAM 0.5 MG/1
1 TABLET ORAL 2 TIMES DAILY PRN
Status: DISCONTINUED | OUTPATIENT
Start: 2022-08-17 | End: 2022-08-20 | Stop reason: HOSPADM

## 2022-08-17 RX ORDER — KETAMINE HCL IN NACL, ISO-OSM 100MG/10ML
SYRINGE (ML) INJECTION AS NEEDED
Status: DISCONTINUED | OUTPATIENT
Start: 2022-08-17 | End: 2022-08-17 | Stop reason: SURG

## 2022-08-17 RX ORDER — SODIUM CHLORIDE 0.9 % (FLUSH) 0.9 %
3 SYRINGE (ML) INJECTION EVERY 12 HOURS SCHEDULED
Status: DISCONTINUED | OUTPATIENT
Start: 2022-08-17 | End: 2022-08-20 | Stop reason: HOSPADM

## 2022-08-17 RX ORDER — LANOLIN ALCOHOL/MO/W.PET/CERES
6 CREAM (GRAM) TOPICAL NIGHTLY
Status: DISCONTINUED | OUTPATIENT
Start: 2022-08-17 | End: 2022-08-20 | Stop reason: HOSPADM

## 2022-08-17 RX ORDER — FENTANYL CITRATE 50 UG/ML
25 INJECTION, SOLUTION INTRAMUSCULAR; INTRAVENOUS
Status: DISCONTINUED | OUTPATIENT
Start: 2022-08-17 | End: 2022-08-17 | Stop reason: HOSPADM

## 2022-08-17 RX ORDER — FENTANYL CITRATE 50 UG/ML
INJECTION, SOLUTION INTRAMUSCULAR; INTRAVENOUS AS NEEDED
Status: DISCONTINUED | OUTPATIENT
Start: 2022-08-17 | End: 2022-08-17 | Stop reason: SURG

## 2022-08-17 RX ORDER — ACETAMINOPHEN 325 MG/1
650 TABLET ORAL EVERY 4 HOURS PRN
Status: DISCONTINUED | OUTPATIENT
Start: 2022-08-17 | End: 2022-08-20 | Stop reason: HOSPADM

## 2022-08-17 RX ORDER — NALOXONE HCL 0.4 MG/ML
0.4 VIAL (ML) INJECTION
Status: DISCONTINUED | OUTPATIENT
Start: 2022-08-17 | End: 2022-08-20 | Stop reason: HOSPADM

## 2022-08-17 RX ORDER — AMOXICILLIN 250 MG
1 CAPSULE ORAL 2 TIMES DAILY
Status: DISCONTINUED | OUTPATIENT
Start: 2022-08-17 | End: 2022-08-20 | Stop reason: HOSPADM

## 2022-08-17 RX ORDER — LIDOCAINE HYDROCHLORIDE 10 MG/ML
0.5 INJECTION, SOLUTION EPIDURAL; INFILTRATION; INTRACAUDAL; PERINEURAL ONCE AS NEEDED
Status: DISCONTINUED | OUTPATIENT
Start: 2022-08-17 | End: 2022-08-17 | Stop reason: HOSPADM

## 2022-08-17 RX ORDER — SODIUM CHLORIDE, SODIUM LACTATE, POTASSIUM CHLORIDE, CALCIUM CHLORIDE 600; 310; 30; 20 MG/100ML; MG/100ML; MG/100ML; MG/100ML
1000 INJECTION, SOLUTION INTRAVENOUS CONTINUOUS
Status: DISCONTINUED | OUTPATIENT
Start: 2022-08-17 | End: 2022-08-17

## 2022-08-17 RX ORDER — FLUMAZENIL 0.1 MG/ML
0.2 INJECTION INTRAVENOUS AS NEEDED
Status: DISCONTINUED | OUTPATIENT
Start: 2022-08-17 | End: 2022-08-17 | Stop reason: HOSPADM

## 2022-08-17 RX ORDER — ONDANSETRON 2 MG/ML
INJECTION INTRAMUSCULAR; INTRAVENOUS AS NEEDED
Status: DISCONTINUED | OUTPATIENT
Start: 2022-08-17 | End: 2022-08-17 | Stop reason: SURG

## 2022-08-17 RX ORDER — DEXTROSE MONOHYDRATE 25 G/50ML
25 INJECTION, SOLUTION INTRAVENOUS
Status: DISCONTINUED | OUTPATIENT
Start: 2022-08-17 | End: 2022-08-17 | Stop reason: SDUPTHER

## 2022-08-17 RX ORDER — DEXTROSE MONOHYDRATE 25 G/50ML
25 INJECTION, SOLUTION INTRAVENOUS
Status: DISCONTINUED | OUTPATIENT
Start: 2022-08-17 | End: 2022-08-20 | Stop reason: HOSPADM

## 2022-08-17 RX ORDER — OXYCODONE AND ACETAMINOPHEN 7.5; 325 MG/1; MG/1
1 TABLET ORAL EVERY 4 HOURS PRN
Status: DISCONTINUED | OUTPATIENT
Start: 2022-08-17 | End: 2022-08-20 | Stop reason: HOSPADM

## 2022-08-17 RX ORDER — SODIUM CHLORIDE 0.9 % (FLUSH) 0.9 %
3-10 SYRINGE (ML) INJECTION AS NEEDED
Status: DISCONTINUED | OUTPATIENT
Start: 2022-08-17 | End: 2022-08-17 | Stop reason: HOSPADM

## 2022-08-17 RX ORDER — SODIUM CHLORIDE, SODIUM LACTATE, POTASSIUM CHLORIDE, CALCIUM CHLORIDE 600; 310; 30; 20 MG/100ML; MG/100ML; MG/100ML; MG/100ML
100 INJECTION, SOLUTION INTRAVENOUS CONTINUOUS
Status: DISCONTINUED | OUTPATIENT
Start: 2022-08-17 | End: 2022-08-17

## 2022-08-17 RX ORDER — SODIUM CHLORIDE 0.9 % (FLUSH) 0.9 %
3 SYRINGE (ML) INJECTION EVERY 12 HOURS SCHEDULED
Status: DISCONTINUED | OUTPATIENT
Start: 2022-08-17 | End: 2022-08-17 | Stop reason: HOSPADM

## 2022-08-17 RX ORDER — DEXAMETHASONE SODIUM PHOSPHATE 4 MG/ML
INJECTION, SOLUTION INTRA-ARTICULAR; INTRALESIONAL; INTRAMUSCULAR; INTRAVENOUS; SOFT TISSUE AS NEEDED
Status: DISCONTINUED | OUTPATIENT
Start: 2022-08-17 | End: 2022-08-17 | Stop reason: SURG

## 2022-08-17 RX ORDER — LISINOPRIL 10 MG/1
10 TABLET ORAL DAILY
Status: DISCONTINUED | OUTPATIENT
Start: 2022-08-18 | End: 2022-08-20 | Stop reason: HOSPADM

## 2022-08-17 RX ORDER — HYDROMORPHONE HYDROCHLORIDE 1 MG/ML
0.5 INJECTION, SOLUTION INTRAMUSCULAR; INTRAVENOUS; SUBCUTANEOUS
Status: DISCONTINUED | OUTPATIENT
Start: 2022-08-17 | End: 2022-08-17 | Stop reason: HOSPADM

## 2022-08-17 RX ORDER — ONDANSETRON 4 MG/1
4 TABLET, FILM COATED ORAL EVERY 6 HOURS PRN
Status: DISCONTINUED | OUTPATIENT
Start: 2022-08-17 | End: 2022-08-20 | Stop reason: HOSPADM

## 2022-08-17 RX ORDER — NALOXONE HCL 0.4 MG/ML
0.04 VIAL (ML) INJECTION AS NEEDED
Status: DISCONTINUED | OUTPATIENT
Start: 2022-08-17 | End: 2022-08-17 | Stop reason: HOSPADM

## 2022-08-17 RX ORDER — BUMETANIDE 1 MG/1
1 TABLET ORAL DAILY PRN
Status: DISCONTINUED | OUTPATIENT
Start: 2022-08-17 | End: 2022-08-20 | Stop reason: HOSPADM

## 2022-08-17 RX ORDER — NICOTINE POLACRILEX 4 MG
15 LOZENGE BUCCAL
Status: DISCONTINUED | OUTPATIENT
Start: 2022-08-17 | End: 2022-08-17 | Stop reason: SDUPTHER

## 2022-08-17 RX ORDER — IBUPROFEN 600 MG/1
600 TABLET ORAL ONCE AS NEEDED
Status: DISCONTINUED | OUTPATIENT
Start: 2022-08-17 | End: 2022-08-17 | Stop reason: HOSPADM

## 2022-08-17 RX ORDER — PREGABALIN 75 MG/1
150 CAPSULE ORAL 2 TIMES DAILY
Status: DISCONTINUED | OUTPATIENT
Start: 2022-08-17 | End: 2022-08-20 | Stop reason: HOSPADM

## 2022-08-17 RX ORDER — SUCCINYLCHOLINE/SOD CL,ISO/PF 200MG/10ML
SYRINGE (ML) INTRAVENOUS AS NEEDED
Status: DISCONTINUED | OUTPATIENT
Start: 2022-08-17 | End: 2022-08-17 | Stop reason: SURG

## 2022-08-17 RX ORDER — SODIUM CHLORIDE 9 MG/ML
100 INJECTION, SOLUTION INTRAVENOUS CONTINUOUS
Status: DISCONTINUED | OUTPATIENT
Start: 2022-08-17 | End: 2022-08-20 | Stop reason: HOSPADM

## 2022-08-17 RX ORDER — DROPERIDOL 2.5 MG/ML
0.62 INJECTION, SOLUTION INTRAMUSCULAR; INTRAVENOUS ONCE AS NEEDED
Status: COMPLETED | OUTPATIENT
Start: 2022-08-17 | End: 2022-08-17

## 2022-08-17 RX ORDER — POLYETHYLENE GLYCOL 3350 17 G/17G
17 POWDER, FOR SOLUTION ORAL DAILY PRN
Status: DISCONTINUED | OUTPATIENT
Start: 2022-08-17 | End: 2022-08-20 | Stop reason: HOSPADM

## 2022-08-17 RX ORDER — INSULIN LISPRO 100 [IU]/ML
90 INJECTION, SOLUTION INTRAVENOUS; SUBCUTANEOUS DAILY
Status: DISCONTINUED | OUTPATIENT
Start: 2022-08-17 | End: 2022-08-17

## 2022-08-17 RX ORDER — OXYCODONE AND ACETAMINOPHEN 10; 325 MG/1; MG/1
1 TABLET ORAL ONCE AS NEEDED
Status: DISCONTINUED | OUTPATIENT
Start: 2022-08-17 | End: 2022-08-17 | Stop reason: HOSPADM

## 2022-08-17 RX ORDER — ACETAMINOPHEN 500 MG
1000 TABLET ORAL ONCE
Status: DISCONTINUED | OUTPATIENT
Start: 2022-08-17 | End: 2022-08-17 | Stop reason: HOSPADM

## 2022-08-17 RX ORDER — ROCURONIUM BROMIDE 10 MG/ML
INJECTION, SOLUTION INTRAVENOUS AS NEEDED
Status: DISCONTINUED | OUTPATIENT
Start: 2022-08-17 | End: 2022-08-17 | Stop reason: SURG

## 2022-08-17 RX ADMIN — LABETALOL HYDROCHLORIDE 5 MG: 5 INJECTION INTRAVENOUS at 18:16

## 2022-08-17 RX ADMIN — PROPOFOL 150 MCG/KG/MIN: 10 INJECTION, EMULSION INTRAVENOUS at 14:00

## 2022-08-17 RX ADMIN — FENTANYL CITRATE 250 MCG: 50 INJECTION, SOLUTION INTRAMUSCULAR; INTRAVENOUS at 14:03

## 2022-08-17 RX ADMIN — PREGABALIN 150 MG: 75 CAPSULE ORAL at 21:33

## 2022-08-17 RX ADMIN — DEXAMETHASONE SODIUM PHOSPHATE 4 MG: 4 INJECTION, SOLUTION INTRA-ARTICULAR; INTRALESIONAL; INTRAMUSCULAR; INTRAVENOUS; SOFT TISSUE at 17:29

## 2022-08-17 RX ADMIN — ROSUVASTATIN CALCIUM 10 MG: 10 TABLET, FILM COATED ORAL at 21:33

## 2022-08-17 RX ADMIN — DROPERIDOL 0.62 MG: 2.5 INJECTION, SOLUTION INTRAMUSCULAR; INTRAVENOUS at 18:27

## 2022-08-17 RX ADMIN — LIDOCAINE HYDROCHLORIDE 100 MG: 20 INJECTION, SOLUTION EPIDURAL; INFILTRATION; INTRACAUDAL; PERINEURAL at 13:14

## 2022-08-17 RX ADMIN — FENTANYL CITRATE 100 MCG: 50 INJECTION, SOLUTION INTRAMUSCULAR; INTRAVENOUS at 16:18

## 2022-08-17 RX ADMIN — ONDANSETRON 4 MG: 2 INJECTION INTRAMUSCULAR; INTRAVENOUS at 17:29

## 2022-08-17 RX ADMIN — HYDROMORPHONE HYDROCHLORIDE 0.5 MG: 1 INJECTION, SOLUTION INTRAMUSCULAR; INTRAVENOUS; SUBCUTANEOUS at 18:15

## 2022-08-17 RX ADMIN — PROPOFOL 150 MCG/KG/MIN: 10 INJECTION, EMULSION INTRAVENOUS at 13:12

## 2022-08-17 RX ADMIN — LABETALOL HYDROCHLORIDE 5 MG: 5 INJECTION INTRAVENOUS at 18:21

## 2022-08-17 RX ADMIN — Medication 2 G: at 13:18

## 2022-08-17 RX ADMIN — Medication 25 MG: at 14:00

## 2022-08-17 RX ADMIN — HYDROMORPHONE HYDROCHLORIDE 2 MG: 2 INJECTION, SOLUTION INTRAMUSCULAR; INTRAVENOUS; SUBCUTANEOUS at 17:05

## 2022-08-17 RX ADMIN — MIDAZOLAM HYDROCHLORIDE 1 MG: 1 INJECTION, SOLUTION INTRAMUSCULAR; INTRAVENOUS at 12:31

## 2022-08-17 RX ADMIN — FENTANYL CITRATE 150 MCG: 50 INJECTION, SOLUTION INTRAMUSCULAR; INTRAVENOUS at 15:18

## 2022-08-17 RX ADMIN — SODIUM CHLORIDE, POTASSIUM CHLORIDE, SODIUM LACTATE AND CALCIUM CHLORIDE: 600; 310; 30; 20 INJECTION, SOLUTION INTRAVENOUS at 16:22

## 2022-08-17 RX ADMIN — SODIUM CHLORIDE, POTASSIUM CHLORIDE, SODIUM LACTATE AND CALCIUM CHLORIDE: 600; 310; 30; 20 INJECTION, SOLUTION INTRAVENOUS at 17:32

## 2022-08-17 RX ADMIN — FENTANYL CITRATE 100 MCG: 50 INJECTION, SOLUTION INTRAMUSCULAR; INTRAVENOUS at 13:58

## 2022-08-17 RX ADMIN — SODIUM CHLORIDE 100 ML/HR: 9 INJECTION, SOLUTION INTRAVENOUS at 20:25

## 2022-08-17 RX ADMIN — Medication 25 MG: at 16:18

## 2022-08-17 RX ADMIN — OXYCODONE HYDROCHLORIDE 20 MG: 20 TABLET, FILM COATED, EXTENDED RELEASE ORAL at 09:29

## 2022-08-17 RX ADMIN — SODIUM CHLORIDE, POTASSIUM CHLORIDE, SODIUM LACTATE AND CALCIUM CHLORIDE 1000 ML: 600; 310; 30; 20 INJECTION, SOLUTION INTRAVENOUS at 09:27

## 2022-08-17 RX ADMIN — Medication 3 ML: at 20:26

## 2022-08-17 RX ADMIN — ROCURONIUM BROMIDE 10 MG: 10 INJECTION, SOLUTION INTRAVENOUS at 13:14

## 2022-08-17 RX ADMIN — FENTANYL CITRATE 50 MCG: 50 INJECTION, SOLUTION INTRAMUSCULAR; INTRAVENOUS at 13:19

## 2022-08-17 RX ADMIN — HYDROMORPHONE HYDROCHLORIDE 1 MG: 1 INJECTION, SOLUTION INTRAMUSCULAR; INTRAVENOUS; SUBCUTANEOUS at 23:26

## 2022-08-17 RX ADMIN — MIDAZOLAM HYDROCHLORIDE 1 MG: 1 INJECTION, SOLUTION INTRAMUSCULAR; INTRAVENOUS at 12:19

## 2022-08-17 RX ADMIN — PROPOFOL 150 MG: 10 INJECTION, EMULSION INTRAVENOUS at 13:14

## 2022-08-17 RX ADMIN — SODIUM CHLORIDE 2 G: 9 INJECTION, SOLUTION INTRAVENOUS at 20:25

## 2022-08-17 RX ADMIN — Medication 200 MG: at 13:14

## 2022-08-17 RX ADMIN — DOCUSATE SODIUM 50 MG AND SENNOSIDES 8.6 MG 1 TABLET: 8.6; 5 TABLET, FILM COATED ORAL at 21:33

## 2022-08-17 RX ADMIN — SODIUM CHLORIDE, POTASSIUM CHLORIDE, SODIUM LACTATE AND CALCIUM CHLORIDE 100 ML/HR: 600; 310; 30; 20 INJECTION, SOLUTION INTRAVENOUS at 09:07

## 2022-08-17 RX ADMIN — FENTANYL CITRATE 100 MCG: 50 INJECTION, SOLUTION INTRAMUSCULAR; INTRAVENOUS at 13:12

## 2022-08-17 NOTE — ANESTHESIA PREPROCEDURE EVALUATION
Anesthesia Evaluation     Patient summary reviewed and Nursing notes reviewed   no history of anesthetic complications:  NPO Solid Status: > 8 hours  NPO Liquid Status: > 2 hours           Airway   Mallampati: I  TM distance: >3 FB  Neck ROM: full  No difficulty expected  Dental - normal exam     Pulmonary    (+) sleep apnea,     ROS comment: Doesn't wear CPAP  Cardiovascular   Exercise tolerance: good (4-7 METS)    ECG reviewed    (+) hypertension well controlled, hyperlipidemia,       Neuro/Psych- negative ROS  GI/Hepatic/Renal/Endo    (+) morbid obesity, GERD well controlled,  diabetes mellitus using insulin,     Musculoskeletal     (+) back pain, neck pain,   Abdominal    Substance History      OB/GYN          Other   arthritis,                      Anesthesia Plan    ASA 3     general     intravenous induction     Anesthetic plan, risks, benefits, and alternatives have been provided, discussed and informed consent has been obtained with: patient.    Use of blood products discussed with patient .   Plan discussed with CRNA.        CODE STATUS:

## 2022-08-18 LAB
ANION GAP SERPL CALCULATED.3IONS-SCNC: 7 MMOL/L (ref 5–15)
BASOPHILS # BLD AUTO: 0.02 10*3/MM3 (ref 0–0.2)
BASOPHILS NFR BLD AUTO: 0.2 % (ref 0–1.5)
BUN SERPL-MCNC: 12 MG/DL (ref 6–20)
BUN/CREAT SERPL: 15.4 (ref 7–25)
CALCIUM SPEC-SCNC: 8.3 MG/DL (ref 8.6–10.5)
CHLORIDE SERPL-SCNC: 102 MMOL/L (ref 98–107)
CO2 SERPL-SCNC: 28 MMOL/L (ref 22–29)
CREAT SERPL-MCNC: 0.78 MG/DL (ref 0.57–1)
DEPRECATED RDW RBC AUTO: 48.8 FL (ref 37–54)
EGFRCR SERPLBLD CKD-EPI 2021: 91 ML/MIN/1.73
EOSINOPHIL # BLD AUTO: 0.09 10*3/MM3 (ref 0–0.4)
EOSINOPHIL NFR BLD AUTO: 1.1 % (ref 0.3–6.2)
ERYTHROCYTE [DISTWIDTH] IN BLOOD BY AUTOMATED COUNT: 15.1 % (ref 12.3–15.4)
GLUCOSE BLDC GLUCOMTR-MCNC: 142 MG/DL (ref 70–130)
GLUCOSE BLDC GLUCOMTR-MCNC: 160 MG/DL (ref 70–130)
GLUCOSE BLDC GLUCOMTR-MCNC: 176 MG/DL (ref 70–130)
GLUCOSE BLDC GLUCOMTR-MCNC: 183 MG/DL (ref 70–130)
GLUCOSE SERPL-MCNC: 193 MG/DL (ref 65–99)
HCT VFR BLD AUTO: 38 % (ref 34–46.6)
HGB BLD-MCNC: 11.9 G/DL (ref 12–15.9)
IMM GRANULOCYTES # BLD AUTO: 0.03 10*3/MM3 (ref 0–0.05)
IMM GRANULOCYTES NFR BLD AUTO: 0.4 % (ref 0–0.5)
LYMPHOCYTES # BLD AUTO: 1.49 10*3/MM3 (ref 0.7–3.1)
LYMPHOCYTES NFR BLD AUTO: 18.6 % (ref 19.6–45.3)
MCH RBC QN AUTO: 27.8 PG (ref 26.6–33)
MCHC RBC AUTO-ENTMCNC: 31.3 G/DL (ref 31.5–35.7)
MCV RBC AUTO: 88.8 FL (ref 79–97)
MONOCYTES # BLD AUTO: 0.71 10*3/MM3 (ref 0.1–0.9)
MONOCYTES NFR BLD AUTO: 8.8 % (ref 5–12)
NEUTROPHILS NFR BLD AUTO: 5.69 10*3/MM3 (ref 1.7–7)
NEUTROPHILS NFR BLD AUTO: 70.9 % (ref 42.7–76)
NRBC BLD AUTO-RTO: 0 /100 WBC (ref 0–0.2)
PLATELET # BLD AUTO: 140 10*3/MM3 (ref 140–450)
PMV BLD AUTO: 12.3 FL (ref 6–12)
POTASSIUM SERPL-SCNC: 4.6 MMOL/L (ref 3.5–5.2)
RBC # BLD AUTO: 4.28 10*6/MM3 (ref 3.77–5.28)
SODIUM SERPL-SCNC: 137 MMOL/L (ref 136–145)
WBC NRBC COR # BLD: 8.03 10*3/MM3 (ref 3.4–10.8)

## 2022-08-18 PROCEDURE — 0 HYDROMORPHONE 1 MG/ML SOLUTION: Performed by: ORTHOPAEDIC SURGERY

## 2022-08-18 PROCEDURE — 97116 GAIT TRAINING THERAPY: CPT

## 2022-08-18 PROCEDURE — 80048 BASIC METABOLIC PNL TOTAL CA: CPT | Performed by: ORTHOPAEDIC SURGERY

## 2022-08-18 PROCEDURE — 97161 PT EVAL LOW COMPLEX 20 MIN: CPT | Performed by: PHYSICAL THERAPIST

## 2022-08-18 PROCEDURE — 82962 GLUCOSE BLOOD TEST: CPT

## 2022-08-18 PROCEDURE — 97165 OT EVAL LOW COMPLEX 30 MIN: CPT

## 2022-08-18 PROCEDURE — 25010000002 CEFAZOLIN PER 500 MG: Performed by: ORTHOPAEDIC SURGERY

## 2022-08-18 PROCEDURE — 85025 COMPLETE CBC W/AUTO DIFF WBC: CPT | Performed by: ORTHOPAEDIC SURGERY

## 2022-08-18 RX ORDER — PANTOPRAZOLE SODIUM 40 MG/1
40 TABLET, DELAYED RELEASE ORAL
Status: DISCONTINUED | OUTPATIENT
Start: 2022-08-18 | End: 2022-08-20 | Stop reason: HOSPADM

## 2022-08-18 RX ORDER — INSULIN LISPRO 100 [IU]/ML
2-7 INJECTION, SOLUTION INTRAVENOUS; SUBCUTANEOUS
Status: DISCONTINUED | OUTPATIENT
Start: 2022-08-18 | End: 2022-08-20 | Stop reason: HOSPADM

## 2022-08-18 RX ADMIN — Medication 6 MG: at 20:01

## 2022-08-18 RX ADMIN — HYDROMORPHONE HYDROCHLORIDE 1 MG: 1 INJECTION, SOLUTION INTRAMUSCULAR; INTRAVENOUS; SUBCUTANEOUS at 21:22

## 2022-08-18 RX ADMIN — DOCUSATE SODIUM 50 MG AND SENNOSIDES 8.6 MG 1 TABLET: 8.6; 5 TABLET, FILM COATED ORAL at 20:01

## 2022-08-18 RX ADMIN — OXYCODONE HYDROCHLORIDE AND ACETAMINOPHEN 2 TABLET: 7.5; 325 TABLET ORAL at 14:21

## 2022-08-18 RX ADMIN — OXYCODONE HYDROCHLORIDE AND ACETAMINOPHEN 2 TABLET: 7.5; 325 TABLET ORAL at 20:00

## 2022-08-18 RX ADMIN — Medication 3 ML: at 20:01

## 2022-08-18 RX ADMIN — PREGABALIN 150 MG: 75 CAPSULE ORAL at 09:18

## 2022-08-18 RX ADMIN — OXYCODONE HYDROCHLORIDE AND ACETAMINOPHEN 1 TABLET: 7.5; 325 TABLET ORAL at 09:18

## 2022-08-18 RX ADMIN — HYDROMORPHONE HYDROCHLORIDE 1 MG: 1 INJECTION, SOLUTION INTRAMUSCULAR; INTRAVENOUS; SUBCUTANEOUS at 04:19

## 2022-08-18 RX ADMIN — ALPRAZOLAM 1 MG: 0.5 TABLET ORAL at 04:19

## 2022-08-18 RX ADMIN — METFORMIN HYDROCHLORIDE: 500 TABLET, EXTENDED RELEASE ORAL at 09:18

## 2022-08-18 RX ADMIN — HYDROMORPHONE HYDROCHLORIDE 1 MG: 1 INJECTION, SOLUTION INTRAMUSCULAR; INTRAVENOUS; SUBCUTANEOUS at 11:40

## 2022-08-18 RX ADMIN — DOCUSATE SODIUM 50 MG AND SENNOSIDES 8.6 MG 1 TABLET: 8.6; 5 TABLET, FILM COATED ORAL at 09:18

## 2022-08-18 RX ADMIN — ROSUVASTATIN CALCIUM 10 MG: 10 TABLET, FILM COATED ORAL at 20:01

## 2022-08-18 RX ADMIN — PANTOPRAZOLE SODIUM 40 MG: 40 TABLET, DELAYED RELEASE ORAL at 15:30

## 2022-08-18 RX ADMIN — LISINOPRIL 10 MG: 10 TABLET ORAL at 09:18

## 2022-08-18 RX ADMIN — SODIUM CHLORIDE 2 G: 9 INJECTION, SOLUTION INTRAVENOUS at 04:19

## 2022-08-18 RX ADMIN — PREGABALIN 150 MG: 75 CAPSULE ORAL at 20:01

## 2022-08-18 RX ADMIN — ALPRAZOLAM 1 MG: 0.5 TABLET ORAL at 20:01

## 2022-08-18 RX ADMIN — OXYCODONE HYDROCHLORIDE AND ACETAMINOPHEN 2 TABLET: 7.5; 325 TABLET ORAL at 04:53

## 2022-08-18 RX ADMIN — SODIUM CHLORIDE 100 ML/HR: 9 INJECTION, SOLUTION INTRAVENOUS at 09:25

## 2022-08-18 RX ADMIN — Medication 3 ML: at 09:18

## 2022-08-18 NOTE — ANESTHESIA POSTPROCEDURE EVALUATION
"Patient: Lucila Kim    Procedure Summary     Date: 08/17/22 Room / Location:  [facility]  /  [facility]    Anesthesia Start: 1308 Anesthesia Stop: 1737    Procedures:       ANTERIOR CERVICAL DISCECTOMY FUSION C4-7, POSTERIOR SPINAL FUSION WITH INSTRUMENTATION C4-7 (N/A Spine Cervical)      CERVICAL FUSION POSTERIOR MINIMALLY INVASIVE (N/A ) Diagnosis: (M54.12)    Surgeons: SUDHA Garcia MD Provider: Bobby Rucker CRNA    Anesthesia Type: general ASA Status: 3          Anesthesia Type: general    Vitals  Vitals Value Taken Time   /78 08/17/22 1840   Temp 98.3 °F (36.8 °C) 08/17/22 1840   Pulse 89 08/17/22 1842   Resp 16 08/17/22 1840   SpO2 99 % 08/17/22 1842   Vitals shown include unvalidated device data.        Post Anesthesia Care and Evaluation    Patient location during evaluation: PACU  Patient participation: complete - patient participated  Level of consciousness: awake and awake and alert  Pain score: 0  Pain management: adequate    Airway patency: patent  Anesthetic complications: No anesthetic complications  PONV Status: none  Cardiovascular status: acceptable  Respiratory status: acceptable  Hydration status: acceptable    Comments: Patient discharged according to acceptable Brooke score per RN assessment. See nursing records for further information.     Blood pressure 176/87, pulse 96, temperature 98.9 °F (37.2 °C), temperature source Oral, resp. rate 14, height 170.2 cm (67\"), weight 116 kg (254 lb 11.2 oz), SpO2 96 %, not currently breastfeeding.        "

## 2022-08-19 LAB
GLUCOSE BLDC GLUCOMTR-MCNC: 71 MG/DL (ref 70–130)
GLUCOSE BLDC GLUCOMTR-MCNC: 93 MG/DL (ref 70–130)
GLUCOSE BLDC GLUCOMTR-MCNC: 97 MG/DL (ref 70–130)

## 2022-08-19 PROCEDURE — 82962 GLUCOSE BLOOD TEST: CPT

## 2022-08-19 PROCEDURE — 97535 SELF CARE MNGMENT TRAINING: CPT | Performed by: OCCUPATIONAL THERAPIST

## 2022-08-19 PROCEDURE — 97116 GAIT TRAINING THERAPY: CPT

## 2022-08-19 RX ADMIN — METFORMIN HYDROCHLORIDE: 500 TABLET, EXTENDED RELEASE ORAL at 09:41

## 2022-08-19 RX ADMIN — PANTOPRAZOLE SODIUM 40 MG: 40 TABLET, DELAYED RELEASE ORAL at 09:41

## 2022-08-19 RX ADMIN — LISINOPRIL 10 MG: 10 TABLET ORAL at 09:41

## 2022-08-19 RX ADMIN — OXYCODONE HYDROCHLORIDE AND ACETAMINOPHEN 2 TABLET: 7.5; 325 TABLET ORAL at 00:34

## 2022-08-19 RX ADMIN — OXYCODONE HYDROCHLORIDE AND ACETAMINOPHEN 2 TABLET: 7.5; 325 TABLET ORAL at 11:36

## 2022-08-19 RX ADMIN — PREGABALIN 150 MG: 75 CAPSULE ORAL at 09:41

## 2022-08-19 RX ADMIN — PREGABALIN 150 MG: 75 CAPSULE ORAL at 21:56

## 2022-08-19 RX ADMIN — Medication 6 MG: at 21:55

## 2022-08-19 RX ADMIN — Medication 3 ML: at 09:41

## 2022-08-19 RX ADMIN — ALPRAZOLAM 1 MG: 0.5 TABLET ORAL at 21:56

## 2022-08-19 RX ADMIN — OXYCODONE HYDROCHLORIDE AND ACETAMINOPHEN 2 TABLET: 7.5; 325 TABLET ORAL at 18:26

## 2022-08-19 RX ADMIN — ALPRAZOLAM 1 MG: 0.5 TABLET ORAL at 09:47

## 2022-08-19 RX ADMIN — ROSUVASTATIN CALCIUM 10 MG: 10 TABLET, FILM COATED ORAL at 21:56

## 2022-08-19 RX ADMIN — DOCUSATE SODIUM 50 MG AND SENNOSIDES 8.6 MG 1 TABLET: 8.6; 5 TABLET, FILM COATED ORAL at 09:41

## 2022-08-19 RX ADMIN — OXYCODONE HYDROCHLORIDE AND ACETAMINOPHEN 2 TABLET: 7.5; 325 TABLET ORAL at 22:34

## 2022-08-19 RX ADMIN — OXYCODONE HYDROCHLORIDE AND ACETAMINOPHEN 2 TABLET: 7.5; 325 TABLET ORAL at 06:06

## 2022-08-19 RX ADMIN — DOCUSATE SODIUM 50 MG AND SENNOSIDES 8.6 MG 1 TABLET: 8.6; 5 TABLET, FILM COATED ORAL at 21:55

## 2022-08-19 RX ADMIN — Medication 3 ML: at 21:56

## 2022-08-20 VITALS
RESPIRATION RATE: 16 BRPM | WEIGHT: 254.7 LBS | HEART RATE: 84 BPM | DIASTOLIC BLOOD PRESSURE: 64 MMHG | OXYGEN SATURATION: 92 % | SYSTOLIC BLOOD PRESSURE: 128 MMHG | HEIGHT: 67 IN | TEMPERATURE: 98 F | BODY MASS INDEX: 39.98 KG/M2

## 2022-08-20 LAB
GLUCOSE BLDC GLUCOMTR-MCNC: 105 MG/DL (ref 70–130)
GLUCOSE BLDC GLUCOMTR-MCNC: 68 MG/DL (ref 70–130)

## 2022-08-20 PROCEDURE — 97116 GAIT TRAINING THERAPY: CPT

## 2022-08-20 PROCEDURE — 82962 GLUCOSE BLOOD TEST: CPT

## 2022-08-20 RX ORDER — OXYCODONE AND ACETAMINOPHEN 10; 325 MG/1; MG/1
1 TABLET ORAL EVERY 6 HOURS PRN
Qty: 60 TABLET | Refills: 0 | Status: SHIPPED | OUTPATIENT
Start: 2022-08-20

## 2022-08-20 RX ADMIN — Medication 3 ML: at 08:14

## 2022-08-20 RX ADMIN — OXYCODONE HYDROCHLORIDE AND ACETAMINOPHEN 2 TABLET: 7.5; 325 TABLET ORAL at 08:13

## 2022-08-20 RX ADMIN — OXYCODONE HYDROCHLORIDE AND ACETAMINOPHEN 1 TABLET: 7.5; 325 TABLET ORAL at 12:15

## 2022-08-20 RX ADMIN — DOCUSATE SODIUM 50 MG AND SENNOSIDES 8.6 MG 1 TABLET: 8.6; 5 TABLET, FILM COATED ORAL at 08:13

## 2022-08-20 RX ADMIN — LISINOPRIL 10 MG: 10 TABLET ORAL at 08:13

## 2022-08-20 RX ADMIN — PREGABALIN 150 MG: 75 CAPSULE ORAL at 08:13

## 2022-08-20 RX ADMIN — PANTOPRAZOLE SODIUM 40 MG: 40 TABLET, DELAYED RELEASE ORAL at 08:13

## 2022-08-21 ENCOUNTER — READMISSION MANAGEMENT (OUTPATIENT)
Dept: CALL CENTER | Facility: HOSPITAL | Age: 52
End: 2022-08-21

## 2022-08-21 NOTE — OUTREACH NOTE
Prep Survey    Flowsheet Row Responses   [facility] patient discharged from? [facility]   Is LACE score < 7 ? No   Emergency Room discharge w/ pulse ox? No   Eligibility Readm Mgmt   Discharge diagnosis s/p ACDF C4-7, PSF C4-7   Does the patient have one of the following disease processes/diagnoses(primary or secondary)? General Surgery   Does the patient have Home health ordered? No   Is there a DME ordered? No   Prep survey completed? Yes          KACEY SOTO - Registered Nurse

## 2022-08-24 ENCOUNTER — READMISSION MANAGEMENT (OUTPATIENT)
Dept: CALL CENTER | Facility: HOSPITAL | Age: 52
End: 2022-08-24

## 2022-08-24 NOTE — OUTREACH NOTE
General Surgery Week 1 Survey    Flowsheet Row Responses   [facility] patient discharged from? [place]   Does the patient have one of the following disease processes/diagnoses(primary or secondary)? General Surgery   Week 1 attempt successful? Yes   Call start time 1738   Call end time 1748   Discharge diagnosis s/p ACDF C4-7, PSF C4-7   Person spoke with today (if not patient) and relationship patient   Meds reviewed with patient/caregiver? Yes   Does the patient have all medications related to this admission filled (includes all antibiotics, pain medications, etc.) Yes   Is the patient taking all medications as directed (includes completed medication regime)? Yes   Medication comments Patient taking miralax for constipation.    Does the patient have a follow up appointment scheduled with their surgeon? Yes  [9/1]   Has the patient kept scheduled appointments due by today? Yes  [Patient has had f/u with PCP ]   Has home health visited the patient within 72 hours of discharge? N/A   DME comments Wearing cervical collar.    Psychosocial issues? No   Did the patient receive a copy of their discharge instructions? Yes   Nursing interventions Reviewed instructions with patient   What is the patient's perception of their health status since discharge? Improving  [patient reports that she did contact surgeons office due to difficulty swallowing and they called her in a steroid pack. ]   Nursing interventions Nurse provided patient education, Advised patient to call provider   Is the patient /caregiver able to teach back basic post-op care? Keep incision areas clean,dry and protected   Is the patient/caregiver able to teach back signs and symptoms of incisional infection? Increased redness, swelling or pain at the incisonal site, Increased drainage or bleeding, Pus or odor from incision, Fever   Is the patient/caregiver able to teach back steps to recovery at home? Set small, achievable goals for return to baseline  health, Rest and rebuild strength, gradually increase activity   If the patient is a current smoker, are they able to teach back resources for cessation? Not a smoker   Is the patient/caregiver able to teach back the hierarchy of who to call/visit for symptoms/problems? PCP, Specialist, Home health nurse, Urgent Care, ED, 911 Yes   Week 1 call completed? Yes          LISA COREA - Registered Nurse

## 2022-09-01 ENCOUNTER — READMISSION MANAGEMENT (OUTPATIENT)
Dept: CALL CENTER | Facility: HOSPITAL | Age: 52
End: 2022-09-01

## 2022-09-01 NOTE — OUTREACH NOTE
General Surgery Week 2 Survey    Flowsheet Row Responses   [facility] patient discharged from? [place]   Does the patient have one of the following disease processes/diagnoses(primary or secondary)? General Surgery   Week 2 attempt successful? Yes   Call end time 1517   Discharge diagnosis s/p ACDF C4-7, PSF C4-7   Person spoke with today (if not patient) and relationship patient   Meds reviewed with patient/caregiver? Yes   Is the patient taking all medications as directed (includes completed medication regime)? Yes   Medication comments Patient taking miralax for constipation.    Does the patient have a follow up appointment scheduled with their surgeon? Yes   Has the patient kept scheduled appointments due by today? Yes   Comments Pt did see PA at surgeon's office today.  CXR looks good   DME comments Wearing cervical collar.  Surgeon is now allowing patient to remove collar to eat and shower.   Psychosocial issues? No   Did the patient receive a copy of their discharge instructions? Yes   Nursing interventions Reviewed instructions with patient   What is the patient's perception of their health status since discharge? Improving   If the patient is a current smoker, are they able to teach back resources for cessation? Not a smoker   Is the patient/caregiver able to teach back the hierarchy of who to call/visit for symptoms/problems? PCP, Specialist, Home health nurse, Urgent Care, ED, 911 Yes   Week 2 call completed? Yes   Wrap up additional comments Pt reports she is improving.  She denies needs.          PALMIRA GALLAGHER - Registered Nurse

## 2022-09-26 ENCOUNTER — DOCUMENTATION (OUTPATIENT)
Dept: ENDOCRINOLOGY | Facility: CLINIC | Age: 52
End: 2022-09-26

## 2022-09-28 ENCOUNTER — SPECIALTY PHARMACY (OUTPATIENT)
Dept: ENDOCRINOLOGY | Facility: CLINIC | Age: 52
End: 2022-09-28

## 2022-09-28 ENCOUNTER — OFFICE VISIT (OUTPATIENT)
Dept: ENDOCRINOLOGY | Facility: CLINIC | Age: 52
End: 2022-09-28

## 2022-09-28 VITALS
SYSTOLIC BLOOD PRESSURE: 126 MMHG | BODY MASS INDEX: 39.87 KG/M2 | OXYGEN SATURATION: 99 % | DIASTOLIC BLOOD PRESSURE: 78 MMHG | WEIGHT: 254 LBS | HEART RATE: 75 BPM | HEIGHT: 67 IN

## 2022-09-28 DIAGNOSIS — I10 ESSENTIAL HYPERTENSION: Primary | ICD-10-CM

## 2022-09-28 DIAGNOSIS — E11.649 TYPE 2 DIABETES MELLITUS WITH HYPOGLYCEMIA WITHOUT COMA, WITH LONG-TERM CURRENT USE OF INSULIN: ICD-10-CM

## 2022-09-28 DIAGNOSIS — E78.2 MIXED DIABETIC HYPERLIPIDEMIA ASSOCIATED WITH TYPE 2 DIABETES MELLITUS: ICD-10-CM

## 2022-09-28 DIAGNOSIS — Z79.4 TYPE 2 DIABETES MELLITUS WITH HYPOGLYCEMIA WITHOUT COMA, WITH LONG-TERM CURRENT USE OF INSULIN: ICD-10-CM

## 2022-09-28 DIAGNOSIS — E11.69 MIXED DIABETIC HYPERLIPIDEMIA ASSOCIATED WITH TYPE 2 DIABETES MELLITUS: ICD-10-CM

## 2022-09-28 LAB — HBA1C MFR BLD: 6.8 %

## 2022-09-28 PROCEDURE — 95251 CONT GLUC MNTR ANALYSIS I&R: CPT | Performed by: INTERNAL MEDICINE

## 2022-09-28 PROCEDURE — 99214 OFFICE O/P EST MOD 30 MIN: CPT | Performed by: INTERNAL MEDICINE

## 2022-09-28 RX ORDER — EMPAGLIFLOZIN, METFORMIN HYDROCHLORIDE 5; 1000 MG/1; MG/1
2 TABLET, EXTENDED RELEASE ORAL
Qty: 60 TABLET | Refills: 11 | Status: SHIPPED | OUTPATIENT
Start: 2022-09-28 | End: 2023-03-31

## 2022-09-28 RX ORDER — DULAGLUTIDE 4.5 MG/.5ML
4.5 INJECTION, SOLUTION SUBCUTANEOUS WEEKLY
Qty: 6 ML | Refills: 3 | Status: SHIPPED | OUTPATIENT
Start: 2022-09-28 | End: 2022-09-28

## 2022-09-28 RX ORDER — TIRZEPATIDE 5 MG/.5ML
5 INJECTION, SOLUTION SUBCUTANEOUS
Qty: 2 ML | Refills: 11 | Status: SHIPPED | OUTPATIENT
Start: 2022-09-28 | End: 2023-01-13

## 2022-09-28 NOTE — PROGRESS NOTES
"                              CC, Type 2 DM        Subjective    Corinthy D Julio is a 52 y.o. female. she is here today for follow-up of T2DM    HPI     T2DM     Duration since 2010      Complications - none      July 2018 , had Lap Band Surgery but will need to take out due to nausea that preceded trulicity      Aggravating Factors :  Gets steroid injections for right shoulder pain , not lately       Current diet  Admits to high carb intake but trying to limit      Current exercise walking     Current monitoring regimen: home blood tests - up to 12 x daily - w  taiwo    Lab Results   Component Value Date    HGBA1C 6.8 09/28/2022        Home blood sugar records:      Using taiwo but falling     PE    /78   Pulse 75   Ht 170.2 cm (67\")   Wt 115 kg (254 lb)   SpO2 99%   BMI 39.78 kg/m²   AOx3  No visible goiter  RRR  CTA  No Edema  Neck collar     Labs    Lab Results   Component Value Date    WBC 8.03 08/18/2022    HGB 11.9 (L) 08/18/2022    HCT 38.0 08/18/2022    MCV 88.8 08/18/2022     08/18/2022     Lab Results   Component Value Date    GLUCOSE 193 (H) 08/18/2022    BUN 12 08/18/2022    CREATININE 0.78 08/18/2022    EGFRIFNONA 63 12/01/2021    BCR 15.4 08/18/2022    K 4.6 08/18/2022    CO2 28.0 08/18/2022    CALCIUM 8.3 (L) 08/18/2022    ALBUMIN 4.30 07/26/2022    AST 13 07/26/2022    ALT 15 07/26/2022            Lab Review      Assessment & Plan      1. Type 2 diabetes mellitus with hypoglycemia without coma, with long-term current use of insulin (HCC)    2. Essential hypertension    3. Hyperlipidemia, unspecified hyperlipidemia type    .    T2DM       Glycemic Management:      Lab Results   Component Value Date    HGBA1C 6.8 09/28/2022            Taiwo 2 weeks  Type 2 with hypoglycemia     Basal 3 u per hour   Decrease to 2 units per hour     Carb ratio, not using , 15- change to 5    Correction 50 -- change to 25    Target 100 --- change to 120          " "  ===========================================     synjardy 5/1000 mg, 2 tabs w bkfast , much less GMI      ===========================================     Trulicity  1.5 mg weekly - increased to 3 mg weekly - 4.5 mg weekly        ===========================================                    Lipid Management   crestor 10 mg qam  Blood Pressure Management:     /78   Pulse 75   Ht 170.2 cm (67\")   Wt 115 kg (254 lb)   SpO2 99%   BMI 39.78 kg/m²        On lisinopril 10 mg daily         Microvascular Complication Monitoring:       Eye Exam Evaluation, within 1 year   no retinopathy   Pending 2022  -----------     Last Microalbumin-Proteinuria Assessment     No results found for: MALBCRERATIO     No results found for: UTPCR   gfr more than 60 and no protein in March 2021   -----------        Neuropathy, none              Weight Related:      Patient's Body mass index is 39.78 kg/m². BMI is above normal parameters. Recommendations include: educational material.     Rule out Cushing's --- weigh gain, rounding facies, lose of muscle tone in legs --ruled out with dex. Stim test     Will collect salivary time 3 at 8 am and 11 pm --no results      Dexamethasone suppression         Component      Latest Ref Rng & Units 4/12/2019   Cortisol - AM      6.2 - 19.4 ug/dL 0.7 (L)   Dexamethasone, Serum      ng/dL 171         Normal response to the dex. Stimulation test            Bone Health     vitamin d def.      Reassess      Thyroid Health       Lab Results   Component Value Date    TSH 1.770 12/01/2021                      Other Diabetes Related Aspects         No results found for: [id number]                 4. Follow-up: No follow-ups on file.     Tricia wilkinson           This document has been electronically signed by Parker Roman MD on September 28, 2022 10:04 CDT    "

## 2022-09-29 ENCOUNTER — SPECIALTY PHARMACY (OUTPATIENT)
Dept: ONCOLOGY | Facility: CLINIC | Age: 52
End: 2022-09-29

## 2022-10-12 ENCOUNTER — DOCUMENTATION (OUTPATIENT)
Dept: ENDOCRINOLOGY | Facility: CLINIC | Age: 52
End: 2022-10-12

## 2022-10-12 RX ORDER — INSULIN PMP CART,AUT,G6/7,CNTR
1 EACH SUBCUTANEOUS ONCE
Qty: 1 KIT | Refills: 0 | Status: SHIPPED | OUTPATIENT
Start: 2022-10-12 | End: 2022-10-12

## 2022-10-12 RX ORDER — INSULIN PMP CART,AUT,G6/7,CNTR
1 EACH SUBCUTANEOUS
Qty: 10 EACH | Refills: 6 | Status: SHIPPED | OUTPATIENT
Start: 2022-10-12 | End: 2022-12-22 | Stop reason: SDUPTHER

## 2022-10-20 ENCOUNTER — SPECIALTY PHARMACY (OUTPATIENT)
Dept: ENDOCRINOLOGY | Facility: CLINIC | Age: 52
End: 2022-10-20

## 2022-12-16 ENCOUNTER — TELEPHONE (OUTPATIENT)
Dept: ENDOCRINOLOGY | Facility: CLINIC | Age: 52
End: 2022-12-16

## 2022-12-19 ENCOUNTER — SPECIALTY PHARMACY (OUTPATIENT)
Dept: ENDOCRINOLOGY | Facility: CLINIC | Age: 52
End: 2022-12-19

## 2022-12-19 NOTE — PROGRESS NOTES
Specialty Pharmacy Refill Coordination Note     Lucila is a 52 y.o. female contacted today regarding refills of  mounjaro  specialty medication(s).    Reviewed and verified with patient:       Specialty medication(s) and dose(s) confirmed: yes    Refill Questions    Flowsheet Row Most Recent Value   Changes to allergies? No   Changes to medications? No   New conditions since last clinic visit No   Unplanned office visit, urgent care, ED, or hospital admission in the last 4 weeks  No   How does patient/caregiver feel medication is working? Good   Financial problems or insurance changes  No   Since the previous refill, were any specialty medication doses or scheduled injections missed or delayed?  No   Does this patient require a clinical escalation to a pharmacist? No          Delivery Questions    Flowsheet Row Most Recent Value   Delivery method FedEx   Delivery address correct? Yes   Delivery phone number [phone]   Number of medications in delivery 1   Medication being filled and delivered mounjaro   Is there any medication that is due not being filled? No   Supplies needed? No supplies needed   Cooler needed? Yes   Do any medications need mixed or dated? No   Copay form of payment Credit card on file   Questions or concerns for the pharmacist? No        Pt originally showed concern, after her first dose of mounjaro,  of  numbness on the left side of the  head. She also thought it couldve been related to PT appointment she had on the same day. After speaking with Lucila today she feels strongly  the numbness is due to neck injury and not the mounjaro.          Follow-up: 30 day(s)     Francoise Murillo, Pharmacy Technician  Specialty Pharmacy Technician

## 2022-12-22 ENCOUNTER — TELEPHONE (OUTPATIENT)
Dept: ENDOCRINOLOGY | Facility: CLINIC | Age: 52
End: 2022-12-22

## 2022-12-22 DIAGNOSIS — Z79.4 TYPE 2 DIABETES MELLITUS WITH HYPERGLYCEMIA, WITH LONG-TERM CURRENT USE OF INSULIN: Primary | ICD-10-CM

## 2022-12-22 DIAGNOSIS — E11.65 TYPE 2 DIABETES MELLITUS WITH HYPERGLYCEMIA, WITH LONG-TERM CURRENT USE OF INSULIN: Primary | ICD-10-CM

## 2022-12-22 RX ORDER — INSULIN PMP CART,AUT,G6/7,CNTR
1 EACH SUBCUTANEOUS
Qty: 10 EACH | Refills: 6 | Status: SHIPPED | OUTPATIENT
Start: 2022-12-22

## 2022-12-22 NOTE — TELEPHONE ENCOUNTER
Pt called, needing refill of Insulin Disposable Pump (Omnipod 5 G6 Pod, Gen 5,) misc    Sent to [facility] , please.    Pt callback number [phone]

## 2022-12-27 RX ORDER — INSULIN LISPRO 100 [IU]/ML
INJECTION, SOLUTION INTRAVENOUS; SUBCUTANEOUS
Qty: 30 ML | Refills: 3 | Status: SHIPPED | OUTPATIENT
Start: 2022-12-27

## 2023-01-05 RX ORDER — FLUCONAZOLE 150 MG/1
TABLET ORAL
Qty: 4 TABLET | Refills: 8 | Status: SHIPPED | OUTPATIENT
Start: 2023-01-05

## 2023-01-13 ENCOUNTER — TELEMEDICINE (OUTPATIENT)
Dept: ENDOCRINOLOGY | Facility: CLINIC | Age: 53
End: 2023-01-13
Payer: MEDICARE

## 2023-01-13 ENCOUNTER — SPECIALTY PHARMACY (OUTPATIENT)
Dept: ENDOCRINOLOGY | Facility: CLINIC | Age: 53
End: 2023-01-13
Payer: MEDICARE

## 2023-01-13 ENCOUNTER — DOCUMENTATION (OUTPATIENT)
Dept: ENDOCRINOLOGY | Facility: CLINIC | Age: 53
End: 2023-01-13
Payer: MEDICARE

## 2023-01-13 DIAGNOSIS — I10 ESSENTIAL HYPERTENSION: ICD-10-CM

## 2023-01-13 DIAGNOSIS — E78.2 MIXED DIABETIC HYPERLIPIDEMIA ASSOCIATED WITH TYPE 2 DIABETES MELLITUS: ICD-10-CM

## 2023-01-13 DIAGNOSIS — E11.9 WELL CONTROLLED TYPE 2 DIABETES MELLITUS: Primary | ICD-10-CM

## 2023-01-13 DIAGNOSIS — E11.69 MIXED DIABETIC HYPERLIPIDEMIA ASSOCIATED WITH TYPE 2 DIABETES MELLITUS: ICD-10-CM

## 2023-01-13 PROCEDURE — 99214 OFFICE O/P EST MOD 30 MIN: CPT | Performed by: INTERNAL MEDICINE

## 2023-01-13 NOTE — PROGRESS NOTES
CC, Type 2 DM    Mode of Visit: Video  Location of patient: Home  You have chosen to receive care through a telehealth visit.  Does the patient consent to use a video/audio connection for your medical care today? Yes  The visit included audio and video interaction. No technical issues occurred during this visit      Cristian Kim is a 52 y.o. female. she is here today for follow-up of T2DM    HPI     T2DM     Duration since 2010      Complications - none      July 2018 , had Lap Band Surgery but will need to take out due to nausea although also on incretin treatment .     Aggravating Factors :  Gets steroid injections for right shoulder pain , not lately       Current diet  Admits to high carb intake but trying to limit      Current exercise walking     Current monitoring regimen: home blood tests - up to 12 x daily - w  taiwo    Lab Results   Component Value Date    HGBA1C 6.8 09/28/2022      PE    There were no vitals taken for this visit.  AOx3  No visible goiter  RRR  CTA  No Edema  Neck collar     Labs    Lab Results   Component Value Date    WBC 8.03 08/18/2022    HGB 11.9 (L) 08/18/2022    HCT 38.0 08/18/2022    MCV 88.8 08/18/2022     08/18/2022     Lab Results   Component Value Date    GLUCOSE 193 (H) 08/18/2022    BUN 12 08/18/2022    CREATININE 0.78 08/18/2022    EGFRIFNONA 63 12/01/2021    BCR 15.4 08/18/2022    K 4.6 08/18/2022    CO2 28.0 08/18/2022    CALCIUM 8.3 (L) 08/18/2022    ALBUMIN 4.30 07/26/2022    AST 13 07/26/2022    ALT 15 07/26/2022             Assessment & Plan      1. Well controlled type 2 diabetes mellitus (HCC)    2. Essential hypertension    3. Mixed diabetic hyperlipidemia associated with type 2 diabetes mellitus (HCC)    .    T2DM       Glycemic Management:      Lab Results   Component Value Date    HGBA1C 6.8 09/28/2022            Taiwo 2 weeks  Type 2 well controlled   Not available  Change to dexcom since changing to  "ominopd 5     Nausea and \"sickness\" attributed to lap band   I stated could be mounjaro but states predates its use    Basal 3 u per hour   Decrease to 2 units per hour     Carb ratio, not using , 15- change to 5    Correction 50 -- change to 25    Target target 110 , correct above 160    pending training on omnipod 5         ===========================================     synjardy 5/1000 mg, 2 tabs w bkfast , much less GMI      ===========================================   Mounjaro 5- to be increased to 7.5 mg weekly      ===========================================                    Lipid Management   crestor 10 mg qam  Blood Pressure Management:     There were no vitals taken for this visit.       On lisinopril 10 mg daily         Microvascular Complication Monitoring:       Eye Exam Evaluation, within 1 year   no retinopathy   Pending 2022  -----------     Last Microalbumin-Proteinuria Assessment     No results found for: MALBCRERATIO     No results found for: UTPCR   gfr more than 60 and no protein in March 2021   -----------        Neuropathy, none              Weight Related:      Patient's There is no height or weight on file to calculate BMI. BMI is above normal parameters. Recommendations include: educational material.     Rule out Cushing's --- weigh gain, rounding facies, lose of muscle tone in legs --ruled out with dex. Stim test     Will collect salivary time 3 at 8 am and 11 pm --no results      Dexamethasone suppression         Component      Latest Ref Rng & Units 4/12/2019   Cortisol - AM      6.2 - 19.4 ug/dL 0.7 (L)   Dexamethasone, Serum      ng/dL 171         Normal response to the dex. Stimulation test            Bone Health     vitamin d def.      Reassess      Thyroid Health       Lab Results   Component Value Date    TSH 1.770 12/01/2021                      Other Diabetes Related Aspects         No results found for: [id number]                 4. Follow-up: No follow-ups on file.     Tricia" jaja           This document has been electronically signed by Parker Roman MD on January 13, 2023 10:22 CST      Orders Placed This Encounter   Procedures   • CBC Auto Differential     Standing Status:   Future     Standing Expiration Date:   1/13/2024     Order Specific Question:   Release to patient     Answer:   Routine Release   • Comprehensive Metabolic Panel     Standing Status:   Future     Standing Expiration Date:   1/13/2024     Order Specific Question:   Release to patient     Answer:   Routine Release   • Hemoglobin A1c     Standing Status:   Future     Standing Expiration Date:   1/13/2024     Order Specific Question:   Release to patient     Answer:   Routine Release   • Lipid Panel     Standing Status:   Future     Standing Expiration Date:   1/13/2024   • Microalbumin / Creatinine Urine Ratio - Urine, Clean Catch     Standing Status:   Future     Standing Expiration Date:   1/13/2024     Order Specific Question:   Release to patient     Answer:   Routine Release   • Vitamin B12     Standing Status:   Future     Standing Expiration Date:   1/13/2024     Order Specific Question:   Release to patient     Answer:   Routine Release   • TSH     Standing Status:   Future     Standing Expiration Date:   1/13/2024     Order Specific Question:   Release to patient     Answer:   Routine Release

## 2023-01-13 NOTE — PROGRESS NOTES
Specialty Pharmacy Refill Coordination Note     Lucila is a 52 y.o. female contacted today regarding refills of  Mounjaro  specialty medication(s).    Specialty medication(s) and dose(s) confirmed: yes    Refill Questions    Flowsheet Row Most Recent Value   Changes to allergies? No   Changes to medications? No   New conditions since last clinic visit No   Unplanned office visit, urgent care, ED, or hospital admission in the last 4 weeks  No   How does patient/caregiver feel medication is working? Good   Financial problems or insurance changes  No   Since the previous refill, were any specialty medication doses or scheduled injections missed or delayed?  No   Does this patient require a clinical escalation to a pharmacist? No          Delivery Questions    Flowsheet Row Most Recent Value   Delivery method FedEx   Delivery address correct? Yes   Delivery phone number [phone]   Number of medications in delivery 1   Medication being filled and delivered mounjaro   Is there any medication that is due not being filled? No   Supplies needed? No supplies needed   Cooler needed? Yes   Do any medications need mixed or dated? No   Copay form of payment Credit card on file   Questions or concerns for the pharmacist? No   Are any medications first time fills? No        Spoke with Dr Paiz for televisit and dose of mounjaro was increased. Mailing out on Monday.         Follow-up: 1 month     Antwan Haile  Specialty Pharmacy Technician

## 2023-01-16 ENCOUNTER — SPECIALTY PHARMACY (OUTPATIENT)
Dept: ENDOCRINOLOGY | Facility: CLINIC | Age: 53
End: 2023-01-16
Payer: MEDICARE

## 2023-01-16 ENCOUNTER — DOCUMENTATION (OUTPATIENT)
Dept: ENDOCRINOLOGY | Facility: CLINIC | Age: 53
End: 2023-01-16
Payer: MEDICARE

## 2023-01-16 NOTE — PROGRESS NOTES
Patient has been getting mounjaro, did not note any complaints/questions during most recent visit with dr. Paiz.     Had tried to call patient but no answer.

## 2023-02-10 ENCOUNTER — TELEPHONE (OUTPATIENT)
Dept: ENDOCRINOLOGY | Facility: CLINIC | Age: 53
End: 2023-02-10
Payer: MEDICARE

## 2023-02-10 NOTE — TELEPHONE ENCOUNTER
Pt called, and is needing Omnipod training as soon as Aditya is certified to do it. I did advise that it'll be 1-2 weeks before he is officially able to do the training.    As soon as Aditya is certified, the patient will be called to come in and see Aditya.    Pt callback number [phone]

## 2023-02-23 ENCOUNTER — SPECIALTY PHARMACY (OUTPATIENT)
Dept: ENDOCRINOLOGY | Facility: CLINIC | Age: 53
End: 2023-02-23
Payer: MEDICARE

## 2023-02-23 RX ORDER — TIRZEPATIDE 10 MG/.5ML
10 INJECTION, SOLUTION SUBCUTANEOUS WEEKLY
Qty: 2 ML | Refills: 5 | Status: SHIPPED | OUTPATIENT
Start: 2023-02-23

## 2023-02-23 NOTE — PROGRESS NOTES
Specialty Pharmacy Refill Coordination Note     Lucila is a 52 y.o. female contacted today regarding refills of  mounjaro specialty medication(s).    Reviewed and verified with patient:       Specialty medication(s) and dose(s) confirmed: yes    Refill Questions    Flowsheet Row Most Recent Value   Changes to allergies? No   Changes to medications? No   New conditions since last clinic visit No   Unplanned office visit, urgent care, ED, or hospital admission in the last 4 weeks  No   How does patient/caregiver feel medication is working? Good   Financial problems or insurance changes  No   Since the previous refill, were any specialty medication doses or scheduled injections missed or delayed?  No   Does this patient require a clinical escalation to a pharmacist? No          Delivery Questions    Flowsheet Row Most Recent Value   Delivery method FedEx   Delivery address correct? Yes   Delivery phone number [phone]   Number of medications in delivery 1   Medication being filled and delivered mounjaro   Doses left of specialty medications 0   Is there any medication that is due not being filled? No   Supplies needed? No supplies needed   Cooler needed? Yes   Do any medications need mixed or dated? No   Copay form of payment Credit card on file   Questions or concerns for the pharmacist? No   Are any medications first time fills? No        No issues to report, we will have to increase to 10mg Mounjaro since the 7.5 is on back order.  Patient was agreeable to this at this time.        Follow-up: 1 month.      Antwan Haile  Specialty Pharmacy Technician

## 2023-03-23 ENCOUNTER — SPECIALTY PHARMACY (OUTPATIENT)
Dept: ENDOCRINOLOGY | Facility: CLINIC | Age: 53
End: 2023-03-23
Payer: MEDICARE

## 2023-03-31 DIAGNOSIS — E11.649 TYPE 2 DIABETES MELLITUS WITH HYPOGLYCEMIA WITHOUT COMA, WITH LONG-TERM CURRENT USE OF INSULIN: ICD-10-CM

## 2023-03-31 DIAGNOSIS — Z79.4 TYPE 2 DIABETES MELLITUS WITH HYPERGLYCEMIA, WITH LONG-TERM CURRENT USE OF INSULIN: ICD-10-CM

## 2023-03-31 DIAGNOSIS — E11.65 TYPE 2 DIABETES MELLITUS WITH HYPERGLYCEMIA, WITH LONG-TERM CURRENT USE OF INSULIN: ICD-10-CM

## 2023-03-31 DIAGNOSIS — Z79.4 TYPE 2 DIABETES MELLITUS WITH HYPOGLYCEMIA WITHOUT COMA, WITH LONG-TERM CURRENT USE OF INSULIN: ICD-10-CM

## 2023-03-31 RX ORDER — EMPAGLIFLOZIN, METFORMIN HYDROCHLORIDE 5; 1000 MG/1; MG/1
TABLET, EXTENDED RELEASE ORAL
Qty: 60 TABLET | Refills: 11 | Status: SHIPPED | OUTPATIENT
Start: 2023-03-31

## 2023-03-31 RX ORDER — ROSUVASTATIN CALCIUM 10 MG/1
TABLET, COATED ORAL
Qty: 30 TABLET | Refills: 11 | Status: SHIPPED | OUTPATIENT
Start: 2023-03-31

## 2023-04-11 ENCOUNTER — NURSE TRIAGE (OUTPATIENT)
Dept: CALL CENTER | Facility: HOSPITAL | Age: 53
End: 2023-04-11
Payer: MEDICARE

## 2023-04-11 ENCOUNTER — HOSPITAL ENCOUNTER (EMERGENCY)
Facility: HOSPITAL | Age: 53
Discharge: LEFT BEFORE DISCHARGE | End: 2023-04-11
Payer: MEDICARE

## 2023-04-11 VITALS
HEIGHT: 67 IN | BODY MASS INDEX: 38.61 KG/M2 | WEIGHT: 246 LBS | SYSTOLIC BLOOD PRESSURE: 120 MMHG | DIASTOLIC BLOOD PRESSURE: 97 MMHG | OXYGEN SATURATION: 99 % | TEMPERATURE: 98 F | HEART RATE: 73 BPM | RESPIRATION RATE: 18 BRPM

## 2023-04-11 LAB — GLUCOSE BLDC GLUCOMTR-MCNC: 267 MG/DL (ref 70–130)

## 2023-04-11 PROCEDURE — 82962 GLUCOSE BLOOD TEST: CPT

## 2023-04-11 PROCEDURE — 99211 OFF/OP EST MAY X REQ PHY/QHP: CPT

## 2023-04-12 NOTE — ED NOTES
Pt states she is leaving, she just got off work and has to be at work in the am. Pt is leaving after triage before being seen by

## 2023-04-12 NOTE — TELEPHONE ENCOUNTER
"Caller states she took about thirty units of humalog around two today and this was extra for her and now her sohan is reading low 51 but glucometer reading 246. Caller states has headache but no other symptoms. Caller states has had issue keeping up since took insulin today. Caller states she is not alone. Caller states has been eating and taking juice and trying to keep it up. Caller advised have somebody drive to ER and if worse in route call 911.       Reason for Disposition  • [1] Low blood glucose (< 70 mg/dL  or 3.9 mmol/L) persists > 30 minutes AND [2] using low blood sugar Care Advice    Additional Information  • Negative: Unconscious or difficult to awaken  • Negative: Seizure occurs  • Negative: Acting confused (e.g., disoriented, slurred speech)  • Negative: Very weak (e.g., can't stand)  • Negative: Sounds like a life-threatening emergency to the triager  • Negative: [1] Vomiting AND [2] signs of dehydration (e.g., very dry mouth, lightheaded, dark urine)  • Negative: [1] Low blood sugar symptoms persist > 30 minutes AND [2] using low blood sugar Care Advice    Answer Assessment - Initial Assessment Questions  1. SYMPTOMS: \"What symptoms are you concerned about?\"      Headache   2. ONSET:  \"When did the symptoms start?\"       Four today   3. BLOOD GLUCOSE: \"What is your blood glucose level?\"        States her sohan is saying low but glucometer is saying 246  4. USUAL RANGE: \"What is your blood glucose level usually?\" (e.g., usual fasting morning value, usual evening value)        5. TYPE 1 or 2:  \"Do you know what type of diabetes you have?\"  (e.g., Type 1, Type 2, Gestational; doesn't know)         6. INSULIN: \"Do you take insulin?\" \"What type of insulin(s) do you use? What is the mode of delivery? (syringe, pen; injection or pump) \"When did you last give yourself an insulin dose?\" (i.e., time or hours/minutes ago) \"How much did you give?\" (i.e., how many units)       Took thirty units at two   7. " "DIABETES PILLS: \"Do you take any pills for your diabetes?\"        8. OTHER SYMPTOMS: \"Do you have any symptoms?\" (e.g., fever, frequent urination, difficulty breathing, vomiting)         9. LOW BLOOD GLUCOSE TREATMENT: \"What have you done so far to treat the low blood glucose level?\"      Ate several snacks and now having juice now   10. FOOD: \"When did you last eat or drink?\"        Before calling   11. ALONE: \"Are you alone right now or is someone with you?\"          Somebody with her   12. PREGNANCY: \"Is there any chance you are pregnant?\" \"When was your last menstrual period?\"    Protocols used: DIABETES - LOW BLOOD SUGAR-ADULT-AH      "

## 2023-04-18 ENCOUNTER — TELEMEDICINE (OUTPATIENT)
Dept: ENDOCRINOLOGY | Facility: CLINIC | Age: 53
End: 2023-04-18
Payer: MEDICARE

## 2023-04-18 ENCOUNTER — DOCUMENTATION (OUTPATIENT)
Dept: ENDOCRINOLOGY | Facility: CLINIC | Age: 53
End: 2023-04-18
Payer: MEDICARE

## 2023-04-18 ENCOUNTER — SPECIALTY PHARMACY (OUTPATIENT)
Dept: ENDOCRINOLOGY | Facility: CLINIC | Age: 53
End: 2023-04-18
Payer: MEDICARE

## 2023-04-18 DIAGNOSIS — I10 ESSENTIAL HYPERTENSION: ICD-10-CM

## 2023-04-18 DIAGNOSIS — E11.65 TYPE 2 DIABETES MELLITUS WITH HYPERGLYCEMIA, WITH LONG-TERM CURRENT USE OF INSULIN: Primary | ICD-10-CM

## 2023-04-18 DIAGNOSIS — E78.5 DYSLIPIDEMIA: ICD-10-CM

## 2023-04-18 DIAGNOSIS — Z79.4 TYPE 2 DIABETES MELLITUS WITH HYPERGLYCEMIA, WITH LONG-TERM CURRENT USE OF INSULIN: Primary | ICD-10-CM

## 2023-04-18 DIAGNOSIS — E78.2 MIXED DIABETIC HYPERLIPIDEMIA ASSOCIATED WITH TYPE 2 DIABETES MELLITUS: ICD-10-CM

## 2023-04-18 DIAGNOSIS — E11.69 MIXED DIABETIC HYPERLIPIDEMIA ASSOCIATED WITH TYPE 2 DIABETES MELLITUS: ICD-10-CM

## 2023-04-18 NOTE — PROGRESS NOTES
CC, Type 2 DM                                      This was a Telehealth Encounter. Benefits and Disadvantages of a Telehealth Visit were discussed and accepted by patient.  Mode of Visit: Video  Location of patient: Home  You have chosen to receive care through a telehealth visit.  Does the patient consent to use a video/audio connection for your medical care today? Yes  The visit included audio and video interaction. No technical issues occurred during this visit      Cristian Kim is a 52 y.o. female. she is here today for follow-up of T2DM    HPI     T2DM     Duration since 2010      Complications - none      July 2018 , had Lap Band Surgery but will need to take out due to nausea although also on incretin treatment .     Aggravating Factors :  Gets steroid injections for right shoulder pain , not lately       Current diet  Admits to high carb intake but trying to limit      Current exercise walking     Current monitoring regimen: home blood tests - up to 12 x daily - w  sohan    Lab Results   Component Value Date    HGBA1C 6.8 09/28/2022      PE    There were no vitals taken for this visit.  AOx3  No visible goiter  RRR  CTA  No Edema  Neck collar     Labs    Lab Results   Component Value Date    WBC 8.03 08/18/2022    HGB 11.9 (L) 08/18/2022    HCT 38.0 08/18/2022    MCV 88.8 08/18/2022     08/18/2022     Lab Results   Component Value Date    GLUCOSE 193 (H) 08/18/2022    BUN 12 08/18/2022    CREATININE 0.78 08/18/2022    EGFRIFNONA 63 12/01/2021    BCR 15.4 08/18/2022    K 4.6 08/18/2022    CO2 28.0 08/18/2022    CALCIUM 8.3 (L) 08/18/2022    ALBUMIN 4.30 07/26/2022    AST 13 07/26/2022    ALT 15 07/26/2022             Assessment & Plan      1. Type 2 diabetes mellitus with hyperglycemia, with long-term current use of insulin    2. Essential hypertension    3. Mixed diabetic hyperlipidemia associated with type 2 diabetes mellitus    4. Dyslipidemia   "  .    T2DM       Glycemic Management:      Lab Results   Component Value Date    HGBA1C 6.8 09/28/2022       Ambulatory Glucose Profile Report    Days Analyzed : 2 week period ending on 04/18/23      Continuous Glucose Monitory Device:  FreeStyle Taiwo 2     -     Interpretation : Diabetes Type 2 with hyperglycemia          Change to dexcom since she already has  ominopd 5     Nausea and \"sickness\" attributed to lap band   I stated could be mounjaro but states predates its use, will meet w surgeon    Omnipod Dash -- change to 5     Basal 3 u per hour   Decrease to 2 units per hour     Carb ratio, not using , 15- change to 5    Correction 50 -- change to 25    Lately not using pods, using only correction     change to 5     Target target 110 , correct above 160    pending training on omnipod 5         ===========================================     synjardy 5/1000 mg, 2 tabs w bkfast , much less GMI      ===========================================   Mounjaro 5- to be increased to 7.5 mg weekly  - 10 mg weekly     ===========================================               Lipid Management   crestor 10 mg qam      Blood Pressure Management:     There were no vitals taken for this visit.       On lisinopril 10 mg daily         Microvascular Complication Monitoring:       Eye Exam Evaluation, within 1 year   no retinopathy     -----------     Last Microalbumin-Proteinuria Assessment     No results found for: MALBCRERATIO     No results found for: UTPCR   gfr more than 60 and no protein   -----------        Neuropathy, none              Weight Related:      Patient's There is no height or weight on file to calculate BMI. BMI is above normal parameters. Recommendations include: educational material.     Rule out Cushing's --- weigh gain, rounding facies, lose of muscle tone in legs --ruled out with dex suppression test     Will collect salivary time 3 at 8 am and 11 pm --no results      Dexamethasone suppression       "   Component      Latest Ref Rng & Units 4/12/2019   Cortisol - AM      6.2 - 19.4 ug/dL 0.7 (L)   Dexamethasone, Serum      ng/dL 171         Normal response to the dex suppression            Bone Health     vitamin d def.      Reassess      Thyroid Health       Lab Results   Component Value Date    TSH 1.770 12/01/2021                      Other Diabetes Related Aspects         No results found for: [id number]                 4. Follow-up: No follow-ups on file.     Tricia jaja           This document has been electronically signed by Parker Roman MD on April 18, 2023 09:56 CDT      Orders Placed This Encounter   Procedures   • CBC Auto Differential     Order Specific Question:   Release to patient     Answer:   Routine Release   • Comprehensive Metabolic Panel     Order Specific Question:   Release to patient     Answer:   Routine Release   • Hemoglobin A1c     Order Specific Question:   Release to patient     Answer:   Routine Release   • Lipid Panel   • Microalbumin / Creatinine Urine Ratio - Urine, Clean Catch     Order Specific Question:   Release to patient     Answer:   Routine Release   • TSH     Order Specific Question:   Release to patient     Answer:   Routine Release   • Vitamin B12     Order Specific Question:   Release to patient     Answer:   Routine Release   • CBC Auto Differential     Standing Status:   Future     Standing Expiration Date:   4/18/2024     Order Specific Question:   Release to patient     Answer:   Routine Release   • Comprehensive Metabolic Panel     Standing Status:   Future     Standing Expiration Date:   4/18/2024     Order Specific Question:   Release to patient     Answer:   Routine Release   • Hemoglobin A1c     Standing Status:   Future     Standing Expiration Date:   4/18/2024     Order Specific Question:   Release to patient     Answer:   Routine Release   • Lipid Panel     Standing Status:   Future     Standing Expiration Date:   4/18/2024   •  Microalbumin / Creatinine Urine Ratio - Urine, Clean Catch     Standing Status:   Future     Standing Expiration Date:   4/18/2024     Order Specific Question:   Release to patient     Answer:   Routine Release   • TSH     Standing Status:   Future     Standing Expiration Date:   4/18/2024     Order Specific Question:   Release to patient     Answer:   Routine Release   • Vitamin B12     Standing Status:   Future     Standing Expiration Date:   4/18/2024     Order Specific Question:   Release to patient     Answer:   Routine Release

## 2023-04-18 NOTE — PROGRESS NOTES
Specialty Pharmacy Refill Coordination Note     Lucila is a 52 y.o. female contacted today regarding refills of  mounjaro  specialty medication(s).    Reviewed and verified with patient:       Specialty medication(s) and dose(s) confirmed: yes    Refill Questions    Flowsheet Row Most Recent Value   Changes to allergies? No   Changes to medications? No   Financial problems or insurance changes  No   Since the previous refill, were any specialty medication doses or scheduled injections missed or delayed?  No   Does this patient require a clinical escalation to a pharmacist? No          Delivery Questions    Flowsheet Row Most Recent Value   Delivery method FedEx   Delivery address correct? Yes   Delivery phone number [phone]   Number of medications in delivery 1   Medication being filled and delivered mounjaro   Copay form of payment Credit card on file   Questions or concerns for the pharmacist? No          Pt is ready to increase to mounjaro 12.5mg at this time. She has had no issues to report on the 10mg dose.       Follow-up: 30 day(s)     Francoise Murillo, Pharmacy Technician  Specialty Pharmacy Technician

## 2023-04-19 RX ORDER — TIRZEPATIDE 12.5 MG/.5ML
12.5 INJECTION, SOLUTION SUBCUTANEOUS WEEKLY
Qty: 2 ML | Refills: 6 | Status: SHIPPED | OUTPATIENT
Start: 2023-04-19

## 2023-05-25 ENCOUNTER — HOSPITAL ENCOUNTER (OUTPATIENT)
Dept: PREOP | Facility: HOSPITAL | Age: 53
Setting detail: HOSPITAL OUTPATIENT SURGERY
Discharge: HOME | End: 2023-05-25
Payer: MEDICARE

## 2023-05-25 ENCOUNTER — ANESTHESIA (OUTPATIENT)
Dept: PREOP | Facility: HOSPITAL | Age: 53
End: 2023-05-25
Payer: MEDICARE

## 2023-05-25 ENCOUNTER — DOCUMENTATION (OUTPATIENT)
Dept: ENDOCRINOLOGY | Facility: CLINIC | Age: 53
End: 2023-05-25
Payer: MEDICARE

## 2023-05-25 ENCOUNTER — SPECIALTY PHARMACY (OUTPATIENT)
Dept: ENDOCRINOLOGY | Facility: CLINIC | Age: 53
End: 2023-05-25
Payer: MEDICARE

## 2023-05-25 ENCOUNTER — ANESTHESIA EVENT (OUTPATIENT)
Dept: PREOP | Facility: HOSPITAL | Age: 53
End: 2023-05-25
Payer: MEDICARE

## 2023-05-25 VITALS
DIASTOLIC BLOOD PRESSURE: 67 MMHG | HEIGHT: 67 IN | HEART RATE: 74 BPM | WEIGHT: 238.98 LBS | BODY MASS INDEX: 37.51 KG/M2 | SYSTOLIC BLOOD PRESSURE: 120 MMHG | OXYGEN SATURATION: 93 % | RESPIRATION RATE: 16 BRPM | TEMPERATURE: 98.4 F

## 2023-05-25 DIAGNOSIS — M75.01 ADHESIVE CAPSULITIS OF BOTH SHOULDERS: Primary | ICD-10-CM

## 2023-05-25 DIAGNOSIS — M75.02 ADHESIVE CAPSULITIS OF BOTH SHOULDERS: Primary | ICD-10-CM

## 2023-05-25 LAB — GLUCOSE BLDC GLUCOMTR-MCNC: 222 MG/DL (ref 70–130)

## 2023-05-25 PROCEDURE — 25010000002 PROPOFOL 10 MG/ML EMULSION

## 2023-05-25 PROCEDURE — 25010000002 MIDAZOLAM PER 1 MG: Performed by: ANESTHESIOLOGY

## 2023-05-25 PROCEDURE — 25010000002 FENTANYL CITRATE (PF) 50 MCG/ML SOLUTION: Performed by: ANESTHESIOLOGY

## 2023-05-25 PROCEDURE — 25010000002 ROPIVACAINE PER 1 MG

## 2023-05-25 PROCEDURE — 25010000002 ROPIVACAINE PER 1 MG: Performed by: ORTHOPAEDIC SURGERY

## 2023-05-25 PROCEDURE — 82948 REAGENT STRIP/BLOOD GLUCOSE: CPT

## 2023-05-25 PROCEDURE — 25010000002 METHYLPREDNISOLONE PER 80 MG: Performed by: ORTHOPAEDIC SURGERY

## 2023-05-25 RX ORDER — LIDOCAINE HYDROCHLORIDE 20 MG/ML
INJECTION, SOLUTION EPIDURAL; INFILTRATION; INTRACAUDAL; PERINEURAL AS NEEDED
Status: DISCONTINUED | OUTPATIENT
Start: 2023-05-25 | End: 2023-05-25 | Stop reason: SURG

## 2023-05-25 RX ORDER — FLUMAZENIL 0.1 MG/ML
0.2 INJECTION INTRAVENOUS AS NEEDED
Status: DISCONTINUED | OUTPATIENT
Start: 2023-05-25 | End: 2023-05-26 | Stop reason: HOSPADM

## 2023-05-25 RX ORDER — HYDROCODONE BITARTRATE AND ACETAMINOPHEN 5; 325 MG/1; MG/1
1 TABLET ORAL ONCE AS NEEDED
Status: DISCONTINUED | OUTPATIENT
Start: 2023-05-25 | End: 2023-05-26 | Stop reason: HOSPADM

## 2023-05-25 RX ORDER — ROPIVACAINE HYDROCHLORIDE 5 MG/ML
8 INJECTION, SOLUTION EPIDURAL; INFILTRATION; PERINEURAL ONCE
Status: COMPLETED | OUTPATIENT
Start: 2023-05-25 | End: 2023-05-25

## 2023-05-25 RX ORDER — PROPOFOL 10 MG/ML
VIAL (ML) INTRAVENOUS AS NEEDED
Status: DISCONTINUED | OUTPATIENT
Start: 2023-05-25 | End: 2023-05-25 | Stop reason: SURG

## 2023-05-25 RX ORDER — OXYCODONE AND ACETAMINOPHEN 7.5; 325 MG/1; MG/1
2 TABLET ORAL EVERY 4 HOURS PRN
Status: DISCONTINUED | OUTPATIENT
Start: 2023-05-25 | End: 2023-05-26 | Stop reason: HOSPADM

## 2023-05-25 RX ORDER — ONDANSETRON 4 MG/1
4 TABLET, FILM COATED ORAL EVERY 8 HOURS PRN
Qty: 20 TABLET | Refills: 0 | Status: SHIPPED | OUTPATIENT
Start: 2023-05-25

## 2023-05-25 RX ORDER — DROPERIDOL 2.5 MG/ML
0.62 INJECTION, SOLUTION INTRAMUSCULAR; INTRAVENOUS ONCE AS NEEDED
Status: DISCONTINUED | OUTPATIENT
Start: 2023-05-25 | End: 2023-05-26 | Stop reason: HOSPADM

## 2023-05-25 RX ORDER — SODIUM CHLORIDE 0.9 % (FLUSH) 0.9 %
3 SYRINGE (ML) INJECTION EVERY 12 HOURS SCHEDULED
Status: DISCONTINUED | OUTPATIENT
Start: 2023-05-25 | End: 2023-05-26 | Stop reason: HOSPADM

## 2023-05-25 RX ORDER — SODIUM CHLORIDE, SODIUM LACTATE, POTASSIUM CHLORIDE, CALCIUM CHLORIDE 600; 310; 30; 20 MG/100ML; MG/100ML; MG/100ML; MG/100ML
1000 INJECTION, SOLUTION INTRAVENOUS CONTINUOUS
Status: DISCONTINUED | OUTPATIENT
Start: 2023-05-25 | End: 2023-05-26 | Stop reason: HOSPADM

## 2023-05-25 RX ORDER — ACETAMINOPHEN 500 MG
1000 TABLET ORAL ONCE
Status: COMPLETED | OUTPATIENT
Start: 2023-05-25 | End: 2023-05-25

## 2023-05-25 RX ORDER — OXYCODONE AND ACETAMINOPHEN 10; 325 MG/1; MG/1
1 TABLET ORAL ONCE AS NEEDED
Status: DISCONTINUED | OUTPATIENT
Start: 2023-05-25 | End: 2023-05-26 | Stop reason: HOSPADM

## 2023-05-25 RX ORDER — LABETALOL HYDROCHLORIDE 5 MG/ML
5 INJECTION, SOLUTION INTRAVENOUS
Status: DISCONTINUED | OUTPATIENT
Start: 2023-05-25 | End: 2023-05-26 | Stop reason: HOSPADM

## 2023-05-25 RX ORDER — HYDROCODONE BITARTRATE AND ACETAMINOPHEN 5; 325 MG/1; MG/1
1 TABLET ORAL EVERY 6 HOURS PRN
Qty: 28 TABLET | Refills: 0 | Status: SHIPPED | OUTPATIENT
Start: 2023-05-25

## 2023-05-25 RX ORDER — FENTANYL CITRATE 50 UG/ML
25 INJECTION, SOLUTION INTRAMUSCULAR; INTRAVENOUS
Status: DISCONTINUED | OUTPATIENT
Start: 2023-05-25 | End: 2023-05-26 | Stop reason: HOSPADM

## 2023-05-25 RX ORDER — MIDAZOLAM HYDROCHLORIDE 1 MG/ML
1 INJECTION INTRAMUSCULAR; INTRAVENOUS ONCE
Status: COMPLETED | OUTPATIENT
Start: 2023-05-25 | End: 2023-05-25

## 2023-05-25 RX ORDER — MELOXICAM 7.5 MG/1
7.5 TABLET ORAL ONCE AS NEEDED
Status: DISCONTINUED | OUTPATIENT
Start: 2023-05-25 | End: 2023-05-26 | Stop reason: HOSPADM

## 2023-05-25 RX ORDER — METHYLPREDNISOLONE ACETATE 80 MG/ML
80 INJECTION, SUSPENSION INTRA-ARTICULAR; INTRALESIONAL; INTRAMUSCULAR; SOFT TISSUE ONCE
Status: COMPLETED | OUTPATIENT
Start: 2023-05-25 | End: 2023-05-25

## 2023-05-25 RX ORDER — LIDOCAINE HYDROCHLORIDE 10 MG/ML
0.5 INJECTION, SOLUTION EPIDURAL; INFILTRATION; INTRACAUDAL; PERINEURAL ONCE AS NEEDED
Status: DISCONTINUED | OUTPATIENT
Start: 2023-05-25 | End: 2023-05-26 | Stop reason: HOSPADM

## 2023-05-25 RX ORDER — FENTANYL CITRATE 50 UG/ML
50 INJECTION, SOLUTION INTRAMUSCULAR; INTRAVENOUS ONCE
Status: COMPLETED | OUTPATIENT
Start: 2023-05-25 | End: 2023-05-25

## 2023-05-25 RX ORDER — SODIUM CHLORIDE, SODIUM LACTATE, POTASSIUM CHLORIDE, CALCIUM CHLORIDE 600; 310; 30; 20 MG/100ML; MG/100ML; MG/100ML; MG/100ML
100 INJECTION, SOLUTION INTRAVENOUS CONTINUOUS
Status: DISCONTINUED | OUTPATIENT
Start: 2023-05-25 | End: 2023-05-26 | Stop reason: HOSPADM

## 2023-05-25 RX ORDER — NALOXONE HCL 0.4 MG/ML
0.4 VIAL (ML) INJECTION AS NEEDED
Status: DISCONTINUED | OUTPATIENT
Start: 2023-05-25 | End: 2023-05-26 | Stop reason: HOSPADM

## 2023-05-25 RX ORDER — NALOXONE HYDROCHLORIDE 4 MG/.1ML
SPRAY NASAL
Qty: 2 EACH | Refills: 0 | Status: SHIPPED | OUTPATIENT
Start: 2023-05-25

## 2023-05-25 RX ORDER — ROPIVACAINE HYDROCHLORIDE 5 MG/ML
INJECTION, SOLUTION EPIDURAL; INFILTRATION; PERINEURAL
Status: COMPLETED | OUTPATIENT
Start: 2023-05-25 | End: 2023-05-25

## 2023-05-25 RX ORDER — ONDANSETRON 2 MG/ML
4 INJECTION INTRAMUSCULAR; INTRAVENOUS ONCE AS NEEDED
Status: DISCONTINUED | OUTPATIENT
Start: 2023-05-25 | End: 2023-05-26 | Stop reason: HOSPADM

## 2023-05-25 RX ORDER — SODIUM CHLORIDE 0.9 % (FLUSH) 0.9 %
3 SYRINGE (ML) INJECTION AS NEEDED
Status: DISCONTINUED | OUTPATIENT
Start: 2023-05-25 | End: 2023-05-26 | Stop reason: HOSPADM

## 2023-05-25 RX ORDER — SODIUM CHLORIDE 0.9 % (FLUSH) 0.9 %
3-10 SYRINGE (ML) INJECTION AS NEEDED
Status: DISCONTINUED | OUTPATIENT
Start: 2023-05-25 | End: 2023-05-26 | Stop reason: HOSPADM

## 2023-05-25 RX ORDER — SODIUM CHLORIDE 9 MG/ML
40 INJECTION, SOLUTION INTRAVENOUS AS NEEDED
Status: DISCONTINUED | OUTPATIENT
Start: 2023-05-25 | End: 2023-05-26 | Stop reason: HOSPADM

## 2023-05-25 RX ORDER — IBUPROFEN 600 MG/1
600 TABLET ORAL ONCE AS NEEDED
Status: DISCONTINUED | OUTPATIENT
Start: 2023-05-25 | End: 2023-05-26 | Stop reason: HOSPADM

## 2023-05-25 RX ADMIN — PROPOFOL INJECTABLE EMULSION 20 MG: 10 INJECTION, EMULSION INTRAVENOUS at 07:08

## 2023-05-25 RX ADMIN — FENTANYL CITRATE 50 MCG: 50 INJECTION, SOLUTION INTRAMUSCULAR; INTRAVENOUS at 06:52

## 2023-05-25 RX ADMIN — PROPOFOL INJECTABLE EMULSION 100 MG: 10 INJECTION, EMULSION INTRAVENOUS at 07:05

## 2023-05-25 RX ADMIN — MIDAZOLAM HYDROCHLORIDE 1 MG: 2 INJECTION, SOLUTION INTRAMUSCULAR; INTRAVENOUS at 06:45

## 2023-05-25 RX ADMIN — METHYLPREDNISOLONE ACETATE 80 MG: 80 INJECTION, SUSPENSION INTRA-ARTICULAR; INTRALESIONAL; INTRAMUSCULAR; SOFT TISSUE at 07:08

## 2023-05-25 RX ADMIN — SODIUM CHLORIDE, POTASSIUM CHLORIDE, SODIUM LACTATE AND CALCIUM CHLORIDE 1000 ML: 600; 310; 30; 20 INJECTION, SOLUTION INTRAVENOUS at 06:11

## 2023-05-25 RX ADMIN — LIDOCAINE HYDROCHLORIDE 100 MG: 20 INJECTION, SOLUTION EPIDURAL; INFILTRATION; INTRACAUDAL; PERINEURAL at 07:05

## 2023-05-25 RX ADMIN — ROPIVACAINE HYDROCHLORIDE 20 ML: 5 INJECTION, SOLUTION EPIDURAL; INFILTRATION; PERINEURAL at 06:55

## 2023-05-25 RX ADMIN — ROPIVACAINE HYDROCHLORIDE 8 ML: 5 INJECTION, SOLUTION EPIDURAL; INFILTRATION; PERINEURAL at 07:09

## 2023-05-25 RX ADMIN — ACETAMINOPHEN 1000 MG: 500 TABLET, FILM COATED ORAL at 06:45

## 2023-05-25 NOTE — OP NOTE
Operative Report    Pt Name: Lucila Kim  MRN: [id number]  YOB: 1970  Date: 5/25/2023         PREOPERATIVE DIAGNOSIS:   Left shoulder adhesive capsulitis.    2.  Right shoulder adhesive capsulitis     POSTOPERATIVE DIAGNOSIS:   Left shoulder adhesive capsulitis.    Right shoulder adhesive capsulitis     PROCEDURES PERFORMED:  1. Left shoulder manipulation under anesthesia.    2. Left shoulder intra-articular corticosteroid injection of 80 mg of Depo-Medrol and 8 mL of 0.2% Naropin.    3. Right shoulder manipulation under anesthesia.    4. Right shoulder intra-articular corticosteroid injection of 80 mg of Depo-Medrol and 8 mL of 0.2% Naropin.     SURGEON: Anibal Diamond MD     ASSISTANT: None.     ANESTHESIA: Monitored anesthesia care.       ESTIMATED BLOOD LOSS: None.     COMPLICATIONS: None.       CONDITION: Stable.       INDICATIONS: This is a 52-year-old female who presented to outpatient clinic with complaints of bilateral long-standing shoulder pain. Patient noticed a decreased range of motion with difficulty elevating both arms overhead. On exam, the patient demonstrated classic signs of adhesive capsulitis with a decreased passive range of motion. Based on this, and based on the fact that the patient was failing to progress with oral anti-inflammatories, activity modification, as well as exercises, the decision was made to take the patient to the operating room for the above-mentioned procedure.       DESCRIPTION OF PROCEDURE: The patient was interviewed in the preanesthesia area, where the operative extremity was marked with a marking pen. The patient was then administered monitored anesthesia care per the anesthesia team. Timeout was then called to confirm the patient, the operative site, as well as the planned procedure.       A gentle manipulation was then performed on the left upper extremity. The patient's arm was gently forward flexed to 175°. There was a mild to moderate amount of  crepitus present with a supple release. With the arm in 90° of abduction, the arm was then externally rotated to approximately 95° to 100°. With pressure over the anterior aspect of the shoulder, the arm was then internally rotated to 75°. The arm was then adducted across the body it. It was then placed at the patient's side and externally rotated to 45°. The arm was then circumducted several times until it had a full arc of motion. All of these maneuvers were repeated multiple times until the patient had a supple release with a near full range of motion.       The area just lateral to the coracoid process was then prepped with an alcohol prep. A 22-gauge needle was introduced into the glenohumeral joint and back pressure was placed across the needle to confirm intra-articular placement. Then 80 mg Depo-Medrol with 8 mL of 0.2% Naropin was then administered. The patient's arm was then remanipulated.     A gentle manipulation was then performed on the right upper extremity. The patient's arm was gently forward flexed to 175°. There was a mild to moderate amount of crepitus present with a supple release. With the arm in 90° of abduction, the arm was then externally rotated to approximately 95° to 100°. With pressure over the anterior aspect of the shoulder, the arm was then internally rotated to 75°. The arm was then adducted across the body it. It was then placed at the patient's side and externally rotated to 45°. The arm was then circumducted several times until it had a full arc of motion. All of these maneuvers were repeated multiple times until the patient had a supple release with a near full range of motion.       The area just lateral to the coracoid process was then prepped with an alcohol prep. A 22-gauge needle was introduced into the glenohumeral joint and back pressure was placed across the needle to confirm intra-articular placement. Then 80 mg Depo-Medrol with 8 mL of 0.2% Naropin was then  administered. The patient's arm was then remanipulated.At the completion of manipulation, the patient awoke without difficulty and was transferred home in stable condition.    The left upper extremity is much more severe than the right.         cc:           Anibal Diamond M.D.

## 2023-05-25 NOTE — ANESTHESIA POSTPROCEDURE EVALUATION
"Patient: Lucila Kim    Procedure Summary       Date: 05/25/23 Room / Location: [facility] PREOP PHASE II    Anesthesia Start: 0656 Anesthesia Stop: 0718    Procedure: MANIPULATION OF JOINT Diagnosis:     Scheduled Providers:  Provider: Nieves Valdivia CRNA    Anesthesia Type: MAC ASA Status: 3            Anesthesia Type: MAC    Vitals  Vitals Value Taken Time   /68 05/25/23 0746   Temp 98.4 °F (36.9 °C) 05/25/23 0716   Pulse 82 05/25/23 0748   Resp 16 05/25/23 0730   SpO2 92 % 05/25/23 0747   Vitals shown include unvalidated device data.        Post Anesthesia Care and Evaluation    Patient location during evaluation: PACU  Patient participation: complete - patient participated  Level of consciousness: awake and alert  Pain management: adequate    Airway patency: patent  Anesthetic complications: No anesthetic complications    Cardiovascular status: acceptable  Respiratory status: acceptable  Hydration status: acceptable    Comments: Blood pressure 120/67, pulse 74, temperature 98.4 °F (36.9 °C), temperature source Temporal, resp. rate 16, height 169 cm (66.54\"), weight 108 kg (238 lb 15.7 oz), SpO2 93 %, not currently breastfeeding.    Pt discharged from PACU based on charles score >8    "

## 2023-05-25 NOTE — ANESTHESIA PREPROCEDURE EVALUATION
Anesthesia Evaluation     Patient summary reviewed and Nursing notes reviewed   no history of anesthetic complications:   NPO Solid Status: > 8 hours  NPO Liquid Status: > 8 hours           Airway   Mallampati: I  TM distance: >3 FB  Neck ROM: full  No difficulty expected  Dental      Pulmonary    (+) ,sleep apnea  (-) not a smoker  Cardiovascular   Exercise tolerance: poor (<4 METS)    (+) hypertensionhyperlipidemia  (-) CAD      Neuro/Psych  (-) seizures, TIA, CVA  GI/Hepatic/Renal/Endo    (+) obesity, GERD, diabetes mellitus type 2 using insulin    ROS Comment: Gastric band    Musculoskeletal     (+) neck pain  Abdominal    Substance History      OB/GYN          Other   arthritis,                   Anesthesia Plan    ASA 3     MAC     intravenous induction     Anesthetic plan, risks, benefits, and alternatives have been provided, discussed and informed consent has been obtained with: patient.    CODE STATUS:

## 2023-05-25 NOTE — ANESTHESIA PROCEDURE NOTES
Peripheral Block    Pre-sedation assessment completed: 5/25/2023 6:54 AM    Patient reassessed immediately prior to procedure    Patient location during procedure: pre-op  Start time: 5/25/2023 6:55 AM  Stop time: 5/25/2023 6:56 AM  Reason for block: procedure for pain, at surgeon's request, post-op pain management and Request by   Performed by  Anesthesiologist: Klarissa Orozco MD  Preanesthetic Checklist  Completed: patient identified, IV checked, site marked, risks and benefits discussed, surgical consent, monitors and equipment checked, pre-op evaluation and timeout performed  Prep:  Pt Position: supine  Sterile barriers:gloves  Prep: ChloraPrep  Patient monitoring: blood pressure monitoring, continuous pulse oximetry and EKG  Procedure    Sedation: yes    Guidance:ultrasound guided and Brachial plexus identified and local anesthetic seen surrounding nerves    ULTRASOUND INTERPRETATION.  Using ultrasound guidance a 20 G gauge needle was placed in close proximity to the nerve, at which point, under ultrasound guidance anesthetic was injected in the area of the nerve and spread of the anesthesia was seen on ultrasound in close proximity thereto.  There were no abnormalities seen on ultrasound; a digital image was taken; and the patient tolerated the procedure with no complications. Images:still images obtained (picture printed and placed in patients chart)    Laterality:left  Block Type:interscalene  Injection Technique:single-shot  Needle Type:echogenic  Needle Gauge:20 G      Medications Used: ropivacaine (NAROPIN) injection 0.5 % - Injection   20 mL - 5/25/2023 6:55:00 AM      Post Assessment  Injection Assessment: negative aspiration for heme, no paresthesia on injection and incremental injection  Patient Tolerance:comfortable throughout block  Complications:no

## 2023-05-25 NOTE — PROGRESS NOTES
Specialty Pharmacy Refill Coordination Note     It is time for her 12.5mg mounjaro refill and we are out of stock.  She is willing to increase to the 15mg to see if that is tolerable, she doses on Sunday so we will mail out Tuesday for Wednesday delivery next week.                  Follow-up: 1 month.      Antwan Haile  Specialty Pharmacy Technician

## 2023-05-25 NOTE — H&P
Orthopedic History and Physical    Pt Name: Lucila Kim  MRN: [id number]  YOB: 1970  Date: 2023      HPI: 52-year-old female presents today for bilateral shoulder manipulation under anesthesia      Past Medical/Surgical History:   Past Medical History:   Diagnosis Date    Arthritis     Cervical spondylosis with radiculopathy 2022    Cervicalgia 2022    Delayed emergence from general anesthesia     GERD (gastroesophageal reflux disease)     History of hyperadrenalism     Hyperlipidemia     Hypertension     Shoulder pain, bilateral 2023    Sleep apnea     Does Not use CPAP    Strep throat 2023    Type 2 diabetes mellitus with hyperglycemia, with long-term current use of insulin 10/25/2018     Past Surgical History:   Procedure Laterality Date    ANTERIOR CERVICAL DISCECTOMY W/ FUSION N/A 2022    Procedure: ANTERIOR CERVICAL DISCECTOMY FUSION C4-7, POSTERIOR SPINAL FUSION WITH INSTRUMENTATION C4-7;  Surgeon: SUDHA Garcia MD;  Location: [facility] OR;  Service: Orthopedic Spine;  Laterality: N/A;     SECTION      HYSTERECTOMY      LAPAROSCOPIC GASTRIC BANDING      POSTERIOR CERVICAL FUSION N/A 2022    Procedure: CERVICAL FUSION POSTERIOR MINIMALLY INVASIVE;  Surgeon: SUDHA Garcia MD;  Location:  [facility] OR;  Service: Orthopedic Spine;  Laterality: N/A;    SHOULDER ARTHROSCOPY Left          Social History:   Social History     Socioeconomic History    Marital status: Single   Tobacco Use    Smoking status: Never    Smokeless tobacco: Never   Vaping Use    Vaping Use: Never used   Substance and Sexual Activity    Alcohol use: No    Drug use: No    Sexual activity: Defer     Birth control/protection: Hysterectomy            Medications:   Current Outpatient Medications:     ALPRAZolam (XANAX) 1 MG tablet, Take 1 tablet by mouth 2 (Two) Times a Day As Needed for Anxiety., Disp: , Rfl:     bumetanide (BUMEX) 1 MG tablet, Take 1 tablet by mouth Daily As  Needed (SWELLING)., Disp: , Rfl:     Continuous Blood Gluc  (FreeStyle Taiwo 2 [place]) device, 1 each Continuous. Use as indicated for glucose monitoring, Disp: 1 each, Rfl: 1    Continuous Blood Gluc Sensor (FreeStyle Taiwo 2 Sensor) misc, 1 each Every 14 (Fourteen) Days., Disp: 1 each, Rfl: 11    Continuous Blood Gluc Sensor (FreeStyle Taiwo 2 Sensor) misc, 1 each Every 14 (Fourteen) Days., Disp: 2 each, Rfl: 11    fluconazole (DIFLUCAN) 150 MG tablet, TAKE 1 TABLET BY MOUTH ONE TIME PER WEEK, Disp: 4 tablet, Rfl: 8    HumaLOG 100 UNIT/ML injection, INJECT 90 UNITS DAILY THROUGH PUMP, Disp: 30 mL, Rfl: 3    lisinopril (PRINIVIL,ZESTRIL) 10 MG tablet, Take 1 tablet by mouth Daily., Disp: , Rfl:     melatonin 5 MG tablet tablet, Take 1 tablet by mouth Every Night., Disp: , Rfl:     omeprazole (priLOSEC) 40 MG capsule, Take 1 capsule by mouth Daily., Disp: , Rfl:     oxyCODONE-acetaminophen (PERCOCET)  MG per tablet, Take 1 tablet by mouth Every 6 (Six) Hours As Needed for Severe Pain ., Disp: 60 tablet, Rfl: 0    pregabalin (LYRICA) 75 MG capsule, 2 capsules 2 (Two) Times a Day., Disp: , Rfl:     rosuvastatin (CRESTOR) 10 MG tablet, TAKE ONE TABLET BY MOUTH AT BEDTIME, Disp: 30 tablet, Rfl: 11    Synjardy XR 5-1000 MG tablet sustained-release 24 hour, TAKE TWO TABLETS BY MOUTH DAILY WITH BREAKFAST, Disp: 60 tablet, Rfl: 11    Tirzepatide (Mounjaro) 12.5 MG/0.5ML solution pen-injector, Inject 12.5 mg (1 pen) under the skin into the appropriate area as directed 1 (One) Time Per Week. (Patient taking differently: Inject 12.5 mg under the skin into the appropriate area as directed 1 (One) Time Per Week.  Q Sunday), Disp: 2 mL, Rfl: 6    Current Facility-Administered Medications:     lactated ringers infusion 1,000 mL, 1,000 mL, Intravenous, Continuous, Anibal Diamond MD, Last Rate: 25 mL/hr at 05/25/23 0656, Restarted at 05/25/23 0711    lactated ringers infusion, 100 mL/hr, Intravenous, Continuous, Ernesto  Klarissa Latif MD    lidocaine PF 1% (XYLOCAINE) injection 0.5 mL, 0.5 mL, Intradermal, Once PRN, Anibal Diamond MD    lidocaine PF 1% (XYLOCAINE) injection 0.5 mL, 0.5 mL, Injection, Once PRN, Klarissa Orozco MD    sodium chloride 0.9 % flush 3 mL, 3 mL, Intravenous, PRN, Anibal Diamond MD    sodium chloride 0.9 % flush 3 mL, 3 mL, Intravenous, Q12H, Klarissa Orozco MD    sodium chloride 0.9 % flush 3-10 mL, 3-10 mL, Intravenous, PRN, Klarissa Orozco MD    sodium chloride 0.9 % infusion 40 mL, 40 mL, Intravenous, PRN, Klarissa Orozco MD    Facility-Administered Medications Ordered in Other Encounters:     lidocaine PF 2% (XYLOCAINE) injection, , Infiltration, PRN, Nieves Valdivia CRNA, 100 mg at 05/25/23 0705    Propofol (DIPRIVAN) injection, , Intravenous, PRN, Nieves Valdivia CRNA, 20 mg at 05/25/23 0708    Allergies:   Allergies   Allergen Reactions    Onion Anaphylaxis and Angioedema    Ultram [Tramadol Hcl] Shortness Of Breath          Review of Systems   Constitutional: Negative.    HENT: Negative.     Eyes: Negative.    Respiratory: Negative.     Cardiovascular: Negative.    Gastrointestinal: Negative.    Genitourinary: Negative.    Skin: Negative.    Allergic/Immunologic: Negative.    Neurological: Negative.    Hematological: Negative.    Psychiatric/Behavioral: Negative.          Physical Exam  Constitutional:       Appearance: She is well-developed.   HENT:      Head: Normocephalic and atraumatic.   Pulmonary:      Effort: Pulmonary effort is normal.   Abdominal:      Palpations: Abdomen is soft.   Musculoskeletal:      Cervical back: Normal range of motion and neck supple.   Skin:     General: Skin is warm and dry.   Neurological:      Mental Status: She is alert and oriented to person, place, and time.   Psychiatric:         Behavior: Behavior normal.         Thought Content: Thought content normal.         Judgment: Judgment normal.       Ortho Exam:  Bilateral shoulder decreased range  of motion.      Imaging:  Imaging Results (Last 72 Hours)       ** No results found for the last 72 hours. **            Labs:   Lab Results (last 24 hours)       Procedure Component Value Units Date/Time    POC Glucose Once [[id number]]  (Abnormal) Collected: 05/25/23 0601    Specimen: Blood Updated: 05/25/23 0614     Glucose 222 mg/dL      Comment: : [id number] Alka AnneMeter ID: [id number]               Impression: Bilateral shoulder adhesive capsulitis      Surgical Plan: Bilateral shoulder manipulation under anesthesia with intra-articular injections.      Electronically signed by Anibal Diamond MD on 5/25/2023 at 07:14 CDT

## 2023-05-25 NOTE — DISCHARGE INSTRUCTIONS
What to expect after a Nerve Block  Nerve blocks administered to block pain affect many types of nerves, including those nerves that control movement, pain, and normal sensation.  Following a nerve block, you may notice some bruising at the site where the block was given.  You may experience sensations such as:  numbness of the affected area or limb, tingling, heaviness (that is the limb feels heavy to you), weakness or inability to move the affected arm or leg, or a feeling as if your arm or leg has “fallen asleep”.    A nerve block can last from 9-18 hours depending on the medications used.  Certain medications can last up to 72 hours, your anesthesiologist may have discussed this with you pre-op. Usually the weakness wears off first followed by the tingling and heaviness.  As the block wears off, you may begin to notice pain; however, this sequence of events may occur in any order.  Typically, you will be able to move your limb before you will feel it.  Once a nerve block begins to wear off, the effects are usually completely gone within 60 minutes.    If you experience continued side effects that you believe are block related, please call your healthcare provider.  Please see block-specific instructions below.      Instructions for any block involving the shoulder or arm  If you have had any kind of shoulder/arm block, you will go home with your arm in a sling.  Wear the sling until the block has completely worn off or as directed by your doctor.  You may be required to wear it for a longer period of time per your surgeon’s recommendations.  I you have had a shoulder/arm block; it is a good idea to sleep on a recliner with pillows under your arm.    Note:  If you have severe or prolonged shortness of breath, please seek medical assistance as soon as possible.    Protection of a “blocked” arm (limb)  After a nerve block, you cannot feel pain, pressure, or extremes of temperature in the affected limb.  And because  of this, your blocked limb is at more risk for injury.  For example, it is possible to burn your limb on an extremely hot surface without feeling it.  When resting, it is important to reposition your limb periodically to avoid prolonged pressure on it.  This may require the use of pillows and padding.  While sleeping, you should avoid rolling onto the affected limb or putting too much pressure on it.  If you have a cast or tight dressing, check the color of your fingers of the affected limb.  Call your surgeon if they look discolored (that is, dusky, dark colored)  Use caution in cold weather.  Cover your limb appropriately to protect it from the cold.  Pain Management  Your surgeon will give you a prescription for pain medication.  Begin taking this before the nerve block wears off.  Bear in mind that sometimes the block can wear off in the middle of the night.

## 2023-07-27 ENCOUNTER — DOCUMENTATION (OUTPATIENT)
Dept: ENDOCRINOLOGY | Facility: CLINIC | Age: 53
End: 2023-07-27
Payer: MEDICARE

## 2023-08-16 ENCOUNTER — SPECIALTY PHARMACY (OUTPATIENT)
Dept: ENDOCRINOLOGY | Facility: CLINIC | Age: 53
End: 2023-08-16
Payer: MEDICARE

## 2023-08-16 NOTE — PROGRESS NOTES
Specialty Pharmacy Refill Coordination Note     Lucila is a 53 y.o. female contacted today regarding refills of  mounjaro specialty medication(s).    Reviewed and verified with patient:   Specialty medication(s) and dose(s) confirmed: yes    Refill Questions      Flowsheet Row Most Recent Value   Changes to allergies? No   Changes to medications? No   New conditions since last clinic visit No   Unplanned office visit, urgent care, ED, or hospital admission in the last 4 weeks  No   How does patient/caregiver feel medication is working? Very good   Financial problems or insurance changes  No   Since the previous refill, were any specialty medication doses or scheduled injections missed or delayed?  No   Does this patient require a clinical escalation to a pharmacist? No            Delivery Questions      Flowsheet Row Most Recent Value   Delivery method [employer]Ex   Delivery address correct? Yes   Number of medications in delivery 1   Medication being filled and delivered mounjaro   Is there any medication that is due not being filled? No   Cooler needed? Yes   Do any medications need mixed or dated? No   Questions or concerns for the pharmacist? No   Are any medications first time fills? No                   Follow-up: 1 month.     Antwan Haile  Specialty Pharmacy Technician

## 2023-09-26 ENCOUNTER — DOCUMENTATION (OUTPATIENT)
Dept: ENDOCRINOLOGY | Facility: CLINIC | Age: 53
End: 2023-09-26
Payer: MEDICARE

## 2024-01-18 ENCOUNTER — TRANSCRIBE ORDERS (OUTPATIENT)
Dept: ADMINISTRATIVE | Facility: HOSPITAL | Age: 54
End: 2024-01-18
Payer: MEDICARE

## 2024-01-18 ENCOUNTER — HOSPITAL ENCOUNTER (OUTPATIENT)
Dept: GENERAL RADIOLOGY | Facility: HOSPITAL | Age: 54
Discharge: HOME | End: 2024-01-18
Admitting: PAIN MEDICINE
Payer: OTHER MISCELLANEOUS

## 2024-01-18 DIAGNOSIS — G90.529 COMPLEX REGIONAL PAIN SYNDROME TYPE 1 OF LOWER EXTREMITY, UNSPECIFIED LATERALITY: ICD-10-CM

## 2024-01-18 DIAGNOSIS — G90.529 COMPLEX REGIONAL PAIN SYNDROME TYPE 1 OF LOWER EXTREMITY, UNSPECIFIED LATERALITY: Primary | ICD-10-CM

## 2024-01-18 PROCEDURE — 73610 X-RAY EXAM OF ANKLE: CPT

## 2024-11-11 ENCOUNTER — OFFICE VISIT (OUTPATIENT)
Age: 54
End: 2024-11-11

## 2024-11-11 VITALS — BODY MASS INDEX: 39.55 KG/M2 | HEIGHT: 67 IN | WEIGHT: 252 LBS

## 2024-11-11 DIAGNOSIS — R20.2 NUMBNESS AND TINGLING IN BOTH HANDS: Primary | ICD-10-CM

## 2024-11-11 DIAGNOSIS — R20.0 NUMBNESS AND TINGLING IN BOTH HANDS: Primary | ICD-10-CM

## 2024-11-11 PROBLEM — H92.09 REFERRED OTALGIA: Status: ACTIVE | Noted: 2019-05-23

## 2024-11-11 PROBLEM — E11.9 DIABETES MELLITUS (HCC): Status: ACTIVE | Noted: 2024-11-11

## 2024-11-11 PROBLEM — M54.2 CERVICALGIA: Status: ACTIVE | Noted: 2022-08-17

## 2024-11-11 PROBLEM — E66.01 MORBID OBESITY: Status: ACTIVE | Noted: 2024-11-11

## 2024-11-11 PROBLEM — J34.89 NASAL VESTIBULITIS: Status: ACTIVE | Noted: 2019-05-23

## 2024-11-11 PROBLEM — M47.22 CERVICAL SPONDYLOSIS WITH RADICULOPATHY: Status: ACTIVE | Noted: 2022-08-17

## 2024-11-11 PROBLEM — R06.83 SNORING: Status: ACTIVE | Noted: 2024-11-11

## 2024-11-11 PROBLEM — M26.629 TEMPOROMANDIBULAR JOINT-PAIN-DYSFUNCTION SYNDROME: Status: ACTIVE | Noted: 2019-05-23

## 2024-11-11 PROBLEM — J31.0 CHRONIC RHINITIS: Status: ACTIVE | Noted: 2024-11-11

## 2024-11-11 PROBLEM — G47.33 OBSTRUCTIVE SLEEP APNEA SYNDROME: Status: ACTIVE | Noted: 2024-11-11

## 2024-11-11 PROBLEM — R09.02 HYPOXIA: Status: ACTIVE | Noted: 2024-11-11

## 2024-11-11 PROBLEM — E83.10 DISORDER OF IRON METABOLISM: Status: ACTIVE | Noted: 2024-11-11

## 2024-11-11 PROBLEM — G25.81 RESTLESS LEGS: Status: ACTIVE | Noted: 2024-11-11

## 2024-11-11 PROBLEM — I10 ESSENTIAL HYPERTENSION: Status: ACTIVE | Noted: 2018-12-07

## 2024-11-11 RX ORDER — ENALAPRIL MALEATE 10 MG/1
10 TABLET ORAL DAILY
COMMUNITY

## 2024-11-11 RX ORDER — IBUPROFEN 800 MG/1
800 TABLET, FILM COATED ORAL 3 TIMES DAILY PRN
COMMUNITY
Start: 2024-10-29

## 2024-11-11 RX ORDER — ERGOCALCIFEROL 1.25 MG/1
CAPSULE, LIQUID FILLED ORAL
COMMUNITY
Start: 2024-09-18

## 2024-11-11 RX ORDER — PROCHLORPERAZINE 25 MG/1
SUPPOSITORY RECTAL
COMMUNITY
Start: 2024-09-24

## 2024-11-11 RX ORDER — LISINOPRIL 10 MG/1
10 TABLET ORAL NIGHTLY
COMMUNITY

## 2024-11-11 RX ORDER — ALPRAZOLAM 1 MG/1
1 TABLET ORAL 2 TIMES DAILY PRN
COMMUNITY

## 2024-11-11 RX ORDER — INSULIN PMP CART,AUT,G6/7,CNTR
EACH SUBCUTANEOUS
COMMUNITY
Start: 2024-10-19

## 2024-11-11 RX ORDER — DOCUSATE SODIUM 100 MG/1
100 CAPSULE, LIQUID FILLED ORAL DAILY
COMMUNITY

## 2024-11-11 RX ORDER — FLUCONAZOLE 150 MG/1
TABLET ORAL
COMMUNITY

## 2024-11-11 RX ORDER — EMPAGLIFLOZIN, METFORMIN HYDROCHLORIDE 5; 1000 MG/1; MG/1
2 TABLET, EXTENDED RELEASE ORAL DAILY
COMMUNITY

## 2024-11-11 RX ORDER — TIRZEPATIDE 15 MG/.5ML
15 INJECTION, SOLUTION SUBCUTANEOUS WEEKLY
COMMUNITY
Start: 2024-11-08 | End: 2025-02-07

## 2024-11-11 NOTE — PROGRESS NOTES
[facility]  [facility]  [address]  [facility] [id number] [phone]     Patient: Aliza Prather   YOB: 1970   Date: 2024     No chief complaint on file.       History of Present Illness  Aliza is a 54 y.o. female left-hand-dominant who presents today for my initial evaluation of chronic, atraumatic bilateral hand numbness and tingling with radicular type symptoms.  She has a significant prior history of ACDF of C4-C7 in 2022 with Dr. Tarango.  She states that some of the pain in her shoulders improved after her cervical spine surgery but states that some of her symptoms persisted.  States that she also has some continued radicular type symptoms.  Was recently evaluated by Dr. Tarango earlier this year and states that she had a repeat cervical spine MRI which was unremarkable and presents today for our evaluation.  She states that a prior EMG and nerve conduction study obtained before her cervical spine surgery revealed bilateral carpal tunnel syndrome for which she has been treated conservatively since that time.      Past Medical History:   Diagnosis Date    Anxiety     Type 2 diabetes mellitus without complication (HCC)       Past Surgical History:   Procedure Laterality Date     SECTION  2011    GASTRIC BAND  2017    HYSTERECTOMY, TOTAL ABDOMINAL      Still has tubes    SHOULDER SURGERY Left     rotator cuff repair      Social History     Socioeconomic History    Marital status: Single   Tobacco Use    Smoking status: Never    Smokeless tobacco: Never   Vaping Use    Vaping status: Never Used   Substance and Sexual Activity    Alcohol use: Never    Drug use: Never      Social History     Occupational History    Not on file   Tobacco Use    Smoking status: Never    Smokeless tobacco: Never   Vaping Use    Vaping status: Never Used   Substance and Sexual Activity    Alcohol use: Never

## 2024-11-14 ENCOUNTER — TRANSCRIBE ORDERS (OUTPATIENT)
Dept: ADMINISTRATIVE | Facility: HOSPITAL | Age: 54
End: 2024-11-14
Payer: MEDICARE

## 2024-11-14 DIAGNOSIS — R20.2 NUMBNESS AND TINGLING IN BOTH HANDS: Primary | ICD-10-CM

## 2024-11-14 DIAGNOSIS — R20.0 NUMBNESS AND TINGLING IN BOTH HANDS: Primary | ICD-10-CM

## 2024-12-09 ENCOUNTER — HOSPITAL ENCOUNTER (OUTPATIENT)
Dept: NEUROLOGY | Facility: HOSPITAL | Age: 54
Discharge: HOME | End: 2024-12-09
Admitting: ORTHOPAEDIC SURGERY
Payer: MEDICARE

## 2024-12-09 DIAGNOSIS — R20.0 NUMBNESS AND TINGLING IN BOTH HANDS: ICD-10-CM

## 2024-12-09 DIAGNOSIS — R20.2 NUMBNESS AND TINGLING IN BOTH HANDS: ICD-10-CM

## 2024-12-09 PROCEDURE — 95911 NRV CNDJ TEST 9-10 STUDIES: CPT

## 2024-12-09 PROCEDURE — 95886 MUSC TEST DONE W/N TEST COMP: CPT

## 2024-12-27 ENCOUNTER — TRANSCRIBE ORDERS (OUTPATIENT)
Dept: ADMINISTRATIVE | Facility: HOSPITAL | Age: 54
End: 2024-12-27
Payer: MEDICARE

## 2024-12-27 DIAGNOSIS — E53.8 B12 DEFICIENCY: ICD-10-CM

## 2024-12-27 DIAGNOSIS — E55.9 VITAMIN D DEFICIENCY: ICD-10-CM

## 2024-12-27 DIAGNOSIS — Z79.4 TYPE 2 DIABETES MELLITUS WITH HYPERGLYCEMIA, WITH LONG-TERM CURRENT USE OF INSULIN: Primary | ICD-10-CM

## 2024-12-27 DIAGNOSIS — I10 ESSENTIAL HYPERTENSION: ICD-10-CM

## 2024-12-27 DIAGNOSIS — E78.5 DYSLIPIDEMIA: ICD-10-CM

## 2024-12-27 DIAGNOSIS — E11.65 TYPE 2 DIABETES MELLITUS WITH HYPERGLYCEMIA, WITH LONG-TERM CURRENT USE OF INSULIN: Primary | ICD-10-CM

## 2024-12-28 ENCOUNTER — LAB (OUTPATIENT)
Dept: LAB | Facility: HOSPITAL | Age: 54
End: 2024-12-28
Payer: MEDICARE

## 2024-12-28 DIAGNOSIS — Z79.4 TYPE 2 DIABETES MELLITUS WITH HYPERGLYCEMIA, WITH LONG-TERM CURRENT USE OF INSULIN: ICD-10-CM

## 2024-12-28 DIAGNOSIS — E78.5 DYSLIPIDEMIA: ICD-10-CM

## 2024-12-28 DIAGNOSIS — E53.8 B12 DEFICIENCY: ICD-10-CM

## 2024-12-28 DIAGNOSIS — I10 ESSENTIAL HYPERTENSION: ICD-10-CM

## 2024-12-28 DIAGNOSIS — E55.9 VITAMIN D DEFICIENCY: ICD-10-CM

## 2024-12-28 DIAGNOSIS — E11.65 TYPE 2 DIABETES MELLITUS WITH HYPERGLYCEMIA, WITH LONG-TERM CURRENT USE OF INSULIN: ICD-10-CM

## 2024-12-28 LAB
ALBUMIN SERPL-MCNC: 4.4 G/DL (ref 3.5–5.2)
ALBUMIN/GLOB SERPL: 1.5 G/DL
ALP SERPL-CCNC: 76 U/L (ref 39–117)
ALT SERPL W P-5'-P-CCNC: 14 U/L (ref 1–33)
ANION GAP SERPL CALCULATED.3IONS-SCNC: 12 MMOL/L (ref 5–15)
AST SERPL-CCNC: 18 U/L (ref 1–32)
BASOPHILS # BLD AUTO: 0.03 10*3/MM3 (ref 0–0.2)
BASOPHILS NFR BLD AUTO: 0.3 % (ref 0–1.5)
BILIRUB SERPL-MCNC: 0.4 MG/DL (ref 0–1.2)
BUN SERPL-MCNC: 15 MG/DL (ref 6–20)
BUN/CREAT SERPL: 22.4 (ref 7–25)
CALCIUM SPEC-SCNC: 9.1 MG/DL (ref 8.6–10.5)
CHLORIDE SERPL-SCNC: 98 MMOL/L (ref 98–107)
CO2 SERPL-SCNC: 27 MMOL/L (ref 22–29)
CREAT SERPL-MCNC: 0.67 MG/DL (ref 0.57–1)
DEPRECATED RDW RBC AUTO: 48.9 FL (ref 37–54)
EGFRCR SERPLBLD CKD-EPI 2021: 103.4 ML/MIN/1.73
EOSINOPHIL # BLD AUTO: 0.2 10*3/MM3 (ref 0–0.4)
EOSINOPHIL NFR BLD AUTO: 2.1 % (ref 0.3–6.2)
ERYTHROCYTE [DISTWIDTH] IN BLOOD BY AUTOMATED COUNT: 16.2 % (ref 12.3–15.4)
GLOBULIN UR ELPH-MCNC: 2.9 GM/DL
GLUCOSE SERPL-MCNC: 94 MG/DL (ref 65–99)
HCT VFR BLD AUTO: 39.7 % (ref 34–46.6)
HGB BLD-MCNC: 12.6 G/DL (ref 12–15.9)
IMM GRANULOCYTES # BLD AUTO: 0.04 10*3/MM3 (ref 0–0.05)
IMM GRANULOCYTES NFR BLD AUTO: 0.4 % (ref 0–0.5)
LYMPHOCYTES # BLD AUTO: 2.65 10*3/MM3 (ref 0.7–3.1)
LYMPHOCYTES NFR BLD AUTO: 27.2 % (ref 19.6–45.3)
MCH RBC QN AUTO: 26.1 PG (ref 26.6–33)
MCHC RBC AUTO-ENTMCNC: 31.7 G/DL (ref 31.5–35.7)
MCV RBC AUTO: 82.4 FL (ref 79–97)
MONOCYTES # BLD AUTO: 0.64 10*3/MM3 (ref 0.1–0.9)
MONOCYTES NFR BLD AUTO: 6.6 % (ref 5–12)
NEUTROPHILS NFR BLD AUTO: 6.19 10*3/MM3 (ref 1.7–7)
NEUTROPHILS NFR BLD AUTO: 63.4 % (ref 42.7–76)
NRBC BLD AUTO-RTO: 0 /100 WBC (ref 0–0.2)
PLATELET # BLD AUTO: 180 10*3/MM3 (ref 140–450)
PMV BLD AUTO: 11.6 FL (ref 6–12)
POTASSIUM SERPL-SCNC: 4 MMOL/L (ref 3.5–5.2)
PROT SERPL-MCNC: 7.3 G/DL (ref 6–8.5)
RBC # BLD AUTO: 4.82 10*6/MM3 (ref 3.77–5.28)
SODIUM SERPL-SCNC: 137 MMOL/L (ref 136–145)
WBC NRBC COR # BLD AUTO: 9.75 10*3/MM3 (ref 3.4–10.8)

## 2024-12-28 PROCEDURE — 82043 UR ALBUMIN QUANTITATIVE: CPT

## 2024-12-28 PROCEDURE — 82607 VITAMIN B-12: CPT

## 2024-12-28 PROCEDURE — 82306 VITAMIN D 25 HYDROXY: CPT

## 2024-12-28 PROCEDURE — 84443 ASSAY THYROID STIM HORMONE: CPT

## 2024-12-28 PROCEDURE — 36415 COLL VENOUS BLD VENIPUNCTURE: CPT

## 2024-12-28 PROCEDURE — 80053 COMPREHEN METABOLIC PANEL: CPT

## 2024-12-28 PROCEDURE — 85025 COMPLETE CBC W/AUTO DIFF WBC: CPT

## 2024-12-28 PROCEDURE — 83036 HEMOGLOBIN GLYCOSYLATED A1C: CPT

## 2024-12-29 LAB
25(OH)D3 SERPL-MCNC: 75.1 NG/ML (ref 30–100)
ALBUMIN UR-MCNC: <1.2 MG/DL
HBA1C MFR BLD: 9.3 % (ref 4.8–5.6)
TSH SERPL DL<=0.05 MIU/L-ACNC: 5.73 UIU/ML (ref 0.27–4.2)
VIT B12 BLD-MCNC: 377 PG/ML (ref 211–946)

## 2025-01-18 NOTE — PROGRESS NOTES
[facility]  [facility]  [address]  [facility] [address]  [phone]     Patient: Aliza Prather   YOB: 1970   Date: 2025     Chief Complaint   Patient presents with    Follow-up     Bilateral hand        History of Present Illness  Aliza is a 54 y.o. female left-hand-dominant who returns today for follow-up of repeat bilateral upper extremity EMG and nerve conduction studies consistent with moderate right mild left carpal tunnel syndrome.  She returns today and feels that her symptoms have worsened in the interim.  She denies any interval injury or trauma.    As a reminder, she originally presented with chronic, atraumatic bilateral hand numbness and tingling with radicular type symptoms.  She has a significant prior history of ACDF of C4-C7 in 2022 with Dr. Tarango.  A repeat cervical spine MRI was obtained per her report and was unremarkable.  Therefore, she was referred to our office.      Past Medical History:   Diagnosis Date    Anxiety     Type 2 diabetes mellitus without complication (HCC)       Past Surgical History:   Procedure Laterality Date     SECTION      GASTRIC BAND  2017    HYSTERECTOMY, TOTAL ABDOMINAL (CERVIX REMOVED)      Still has tubes    SHOULDER SURGERY Left     rotator cuff repair      Social History     Socioeconomic History    Marital status: Single     Spouse name: None    Number of children: None    Years of education: None    Highest education level: None   Tobacco Use    Smoking status: Never    Smokeless tobacco: Never   Vaping Use    Vaping status: Never Used   Substance and Sexual Activity    Alcohol use: Never    Drug use: Never     Social Determinants of Health      Received from [facility]    Family and Community Support    Received from [facility]    Abuse Screen    Received from [facility]

## 2025-01-20 ENCOUNTER — OFFICE VISIT (OUTPATIENT)
Age: 55
End: 2025-01-20

## 2025-01-20 VITALS — BODY MASS INDEX: 39.55 KG/M2 | HEIGHT: 67 IN | WEIGHT: 252 LBS

## 2025-01-20 DIAGNOSIS — G56.02 LEFT CARPAL TUNNEL SYNDROME: ICD-10-CM

## 2025-01-20 DIAGNOSIS — G56.01 RIGHT CARPAL TUNNEL SYNDROME: Primary | ICD-10-CM

## 2025-01-20 PROBLEM — E11.9 DIABETES MELLITUS (HCC): Status: RESOLVED | Noted: 2024-11-11 | Resolved: 2025-01-20

## 2025-01-20 RX ORDER — LIDOCAINE HYDROCHLORIDE 10 MG/ML
5 INJECTION, SOLUTION INFILTRATION; PERINEURAL ONCE
Status: COMPLETED | OUTPATIENT
Start: 2025-01-20 | End: 2025-01-20

## 2025-01-20 RX ORDER — BETAMETHASONE SODIUM PHOSPHATE AND BETAMETHASONE ACETATE 3; 3 MG/ML; MG/ML
6 INJECTION, SUSPENSION INTRA-ARTICULAR; INTRALESIONAL; INTRAMUSCULAR; SOFT TISSUE ONCE
Status: COMPLETED | OUTPATIENT
Start: 2025-01-20 | End: 2025-01-20

## 2025-01-20 RX ADMIN — BETAMETHASONE SODIUM PHOSPHATE AND BETAMETHASONE ACETATE 6 MG: 3; 3 INJECTION, SUSPENSION INTRA-ARTICULAR; INTRALESIONAL; INTRAMUSCULAR; SOFT TISSUE at 15:21

## 2025-01-20 RX ADMIN — LIDOCAINE HYDROCHLORIDE 5 ML: 10 INJECTION, SOLUTION INFILTRATION; PERINEURAL at 15:21

## 2025-01-20 RX ADMIN — BETAMETHASONE SODIUM PHOSPHATE AND BETAMETHASONE ACETATE 6 MG: 3; 3 INJECTION, SUSPENSION INTRA-ARTICULAR; INTRALESIONAL; INTRAMUSCULAR; SOFT TISSUE at 15:20

## 2025-03-21 NOTE — PROGRESS NOTES
[facility]  [facility]  [address]  [facility] [address]  [phone]     Patient: Aliza Prather   YOB: 1970   Date: 3/24/2025     Chief Complaint   Patient presents with    Follow-up     Bilateral wrist        History of Present Illness  Aliza is a 54 y.o. female left-hand-dominant who returns today for follow-up of bilateral carpal tunnel corticosteroid injections which provided significant but transient relief.  Prior EMG and nerve conduction studies consistent with moderate right mild left carpal tunnel syndrome.  She feels that her symptoms have regressed to baseline.  She denies any interval injury or trauma.    As a reminder, she originally presented with chronic, atraumatic bilateral hand numbness and tingling with radicular type symptoms.  She has a significant prior history of ACDF of C4-C7 in 2022 with Dr. Tarango.  A repeat cervical spine MRI was obtained per her report and was unremarkable.      Past Medical History:   Diagnosis Date    Anxiety     Type 2 diabetes mellitus without complication (HCC)       Past Surgical History:   Procedure Laterality Date     SECTION  2011    GASTRIC BAND  2017    HYSTERECTOMY, TOTAL ABDOMINAL (CERVIX REMOVED)      Still has tubes    SHOULDER SURGERY Left     rotator cuff repair      Social History     Socioeconomic History    Marital status: Single     Spouse name: None    Number of children: None    Years of education: None    Highest education level: None   Tobacco Use    Smoking status: Never    Smokeless tobacco: Never   Vaping Use    Vaping status: Never Used   Substance and Sexual Activity    Alcohol use: Never    Drug use: Never     Social Drivers of Health      Received from [facility]    Family and Community Support    Received from [facility]    Abuse Screen    Received from [facility]    Housing

## 2025-03-24 ENCOUNTER — OFFICE VISIT (OUTPATIENT)
Age: 55
End: 2025-03-24
Payer: MEDICARE

## 2025-03-24 VITALS — BODY MASS INDEX: 39.55 KG/M2 | WEIGHT: 252 LBS | HEIGHT: 67 IN

## 2025-03-24 DIAGNOSIS — G56.02 LEFT CARPAL TUNNEL SYNDROME: ICD-10-CM

## 2025-03-24 DIAGNOSIS — G56.01 RIGHT CARPAL TUNNEL SYNDROME: Primary | ICD-10-CM

## 2025-03-24 PROCEDURE — 1036F TOBACCO NON-USER: CPT | Performed by: ORTHOPAEDIC SURGERY

## 2025-03-24 PROCEDURE — G8417 CALC BMI ABV UP PARAM F/U: HCPCS | Performed by: ORTHOPAEDIC SURGERY

## 2025-03-24 PROCEDURE — 3017F COLORECTAL CA SCREEN DOC REV: CPT | Performed by: ORTHOPAEDIC SURGERY

## 2025-03-24 PROCEDURE — 99214 OFFICE O/P EST MOD 30 MIN: CPT | Performed by: ORTHOPAEDIC SURGERY

## 2025-03-24 PROCEDURE — G8427 DOCREV CUR MEDS BY ELIG CLIN: HCPCS | Performed by: ORTHOPAEDIC SURGERY

## 2025-03-24 RX ORDER — MELOXICAM 7.5 MG/1
7.5 TABLET ORAL DAILY
COMMUNITY
Start: 2025-03-06

## 2025-03-28 ENCOUNTER — TRANSCRIBE ORDERS (OUTPATIENT)
Dept: ADMINISTRATIVE | Facility: HOSPITAL | Age: 55
End: 2025-03-28
Payer: MEDICARE

## 2025-03-28 DIAGNOSIS — H47.10 PAPILLEDEMA: Primary | ICD-10-CM

## 2025-04-01 ENCOUNTER — HOSPITAL ENCOUNTER (OUTPATIENT)
Dept: MRI IMAGING | Facility: HOSPITAL | Age: 55
Discharge: HOME | End: 2025-04-01
Payer: MEDICARE

## 2025-04-01 DIAGNOSIS — H47.10 PAPILLEDEMA: ICD-10-CM

## 2025-04-01 LAB — CREAT BLDA-MCNC: 0.9 MG/DL (ref 0.6–1.3)

## 2025-04-01 PROCEDURE — 82565 ASSAY OF CREATININE: CPT

## 2025-04-01 PROCEDURE — 70543 MRI ORBT/FAC/NCK W/O &W/DYE: CPT

## 2025-04-01 PROCEDURE — 70553 MRI BRAIN STEM W/O & W/DYE: CPT

## 2025-04-01 PROCEDURE — 25510000001 GADOPICLENOL 0.5 MMOL/ML SOLUTION: Performed by: OPHTHALMOLOGY

## 2025-04-01 PROCEDURE — A9579 GAD-BASE MR CONTRAST NOS,1ML: HCPCS | Performed by: OPHTHALMOLOGY

## 2025-04-01 RX ADMIN — GADOPICLENOL 10 ML: 485.1 INJECTION INTRAVENOUS at 11:08

## 2025-04-22 ENCOUNTER — TRANSCRIBE ORDERS (OUTPATIENT)
Dept: ADMINISTRATIVE | Facility: HOSPITAL | Age: 55
End: 2025-04-22
Payer: MEDICARE

## 2025-04-22 ENCOUNTER — LAB (OUTPATIENT)
Dept: LAB | Facility: HOSPITAL | Age: 55
End: 2025-04-22
Payer: MEDICARE

## 2025-04-22 DIAGNOSIS — E11.69 TYPE 2 DIABETES MELLITUS WITH OBESITY: Primary | ICD-10-CM

## 2025-04-22 DIAGNOSIS — E66.9 TYPE 2 DIABETES MELLITUS WITH OBESITY: ICD-10-CM

## 2025-04-22 DIAGNOSIS — E55.9 VITAMIN D DEFICIENCY: ICD-10-CM

## 2025-04-22 DIAGNOSIS — H47.10 BLURRING OF OPTIC DISC: ICD-10-CM

## 2025-04-22 DIAGNOSIS — E11.69 TYPE 2 DIABETES MELLITUS WITH OBESITY: ICD-10-CM

## 2025-04-22 DIAGNOSIS — H47.10 BLURRING OF OPTIC DISC: Primary | ICD-10-CM

## 2025-04-22 DIAGNOSIS — E66.9 TYPE 2 DIABETES MELLITUS WITH OBESITY: Primary | ICD-10-CM

## 2025-04-22 LAB
25(OH)D3 SERPL-MCNC: 65.9 NG/ML (ref 30–100)
ALBUMIN SERPL-MCNC: 4.3 G/DL (ref 3.5–5.2)
ALBUMIN UR-MCNC: <1.2 MG/DL
ALBUMIN/GLOB SERPL: 1.3 G/DL
ALP SERPL-CCNC: 80 U/L (ref 39–117)
ALT SERPL W P-5'-P-CCNC: 15 U/L (ref 1–33)
ANION GAP SERPL CALCULATED.3IONS-SCNC: 13 MMOL/L (ref 5–15)
AST SERPL-CCNC: 14 U/L (ref 1–32)
BASOPHILS # BLD MANUAL: 0 10*3/MM3 (ref 0–0.2)
BASOPHILS NFR BLD MANUAL: 0 % (ref 0–1.5)
BILIRUB SERPL-MCNC: 0.4 MG/DL (ref 0–1.2)
BUN SERPL-MCNC: 19 MG/DL (ref 6–20)
BUN/CREAT SERPL: 23.8 (ref 7–25)
CALCIUM SPEC-SCNC: 9.1 MG/DL (ref 8.6–10.5)
CHLORIDE SERPL-SCNC: 101 MMOL/L (ref 98–107)
CHOLEST SERPL-MCNC: 135 MG/DL (ref 0–200)
CO2 SERPL-SCNC: 21 MMOL/L (ref 22–29)
CREAT SERPL-MCNC: 0.8 MG/DL (ref 0.57–1)
DEPRECATED RDW RBC AUTO: 48.3 FL (ref 37–54)
EGFRCR SERPLBLD CKD-EPI 2021: 87.1 ML/MIN/1.73
EOSINOPHIL # BLD MANUAL: 0 10*3/MM3 (ref 0–0.4)
EOSINOPHIL NFR BLD MANUAL: 0 % (ref 0.3–6.2)
ERYTHROCYTE [DISTWIDTH] IN BLOOD BY AUTOMATED COUNT: 16.3 % (ref 12.3–15.4)
GIANT PLATELETS: ABNORMAL
GLOBULIN UR ELPH-MCNC: 3.2 GM/DL
GLUCOSE SERPL-MCNC: 257 MG/DL (ref 65–99)
HBA1C MFR BLD: 9.4 % (ref 4.8–5.6)
HCT VFR BLD AUTO: 40.3 % (ref 34–46.6)
HDLC SERPL-MCNC: 39 MG/DL (ref 40–60)
HGB BLD-MCNC: 12.8 G/DL (ref 12–15.9)
LDLC SERPL CALC-MCNC: 67 MG/DL (ref 0–100)
LDLC/HDLC SERPL: 1.58 {RATIO}
LYMPHOCYTES # BLD MANUAL: 0.97 10*3/MM3 (ref 0.7–3.1)
LYMPHOCYTES NFR BLD MANUAL: 4.1 % (ref 5–12)
MCH RBC QN AUTO: 26 PG (ref 26.6–33)
MCHC RBC AUTO-ENTMCNC: 31.8 G/DL (ref 31.5–35.7)
MCV RBC AUTO: 81.9 FL (ref 79–97)
MONOCYTES # BLD: 0.3 10*3/MM3 (ref 0.1–0.9)
NEUTROPHILS # BLD AUTO: 6.03 10*3/MM3 (ref 1.7–7)
NEUTROPHILS NFR BLD MANUAL: 81.6 % (ref 42.7–76)
NEUTS BAND NFR BLD MANUAL: 1 % (ref 0–5)
PLATELET # BLD AUTO: 186 10*3/MM3 (ref 140–450)
PMV BLD AUTO: 13.1 FL (ref 6–12)
POIKILOCYTOSIS BLD QL SMEAR: ABNORMAL
POTASSIUM SERPL-SCNC: 4.5 MMOL/L (ref 3.5–5.2)
PROT SERPL-MCNC: 7.5 G/DL (ref 6–8.5)
RBC # BLD AUTO: 4.92 10*6/MM3 (ref 3.77–5.28)
SODIUM SERPL-SCNC: 135 MMOL/L (ref 136–145)
T4 FREE SERPL-MCNC: 0.92 NG/DL (ref 0.93–1.7)
TRIGL SERPL-MCNC: 172 MG/DL (ref 0–150)
TSH SERPL DL<=0.05 MIU/L-ACNC: 2.65 UIU/ML (ref 0.27–4.2)
VARIANT LYMPHS NFR BLD MANUAL: 10.2 % (ref 19.6–45.3)
VARIANT LYMPHS NFR BLD MANUAL: 3.1 % (ref 0–5)
VIT B12 BLD-MCNC: 682 PG/ML (ref 211–946)
VLDLC SERPL-MCNC: 29 MG/DL (ref 5–40)
WBC MORPH BLD: NORMAL
WBC NRBC COR # BLD AUTO: 7.3 10*3/MM3 (ref 3.4–10.8)

## 2025-04-22 PROCEDURE — 36415 COLL VENOUS BLD VENIPUNCTURE: CPT

## 2025-04-22 PROCEDURE — 82306 VITAMIN D 25 HYDROXY: CPT

## 2025-04-22 PROCEDURE — 82607 VITAMIN B-12: CPT | Performed by: INTERNAL MEDICINE

## 2025-04-22 PROCEDURE — 84443 ASSAY THYROID STIM HORMONE: CPT

## 2025-04-22 PROCEDURE — 85025 COMPLETE CBC W/AUTO DIFF WBC: CPT | Performed by: INTERNAL MEDICINE

## 2025-04-22 PROCEDURE — 80053 COMPREHEN METABOLIC PANEL: CPT

## 2025-04-22 PROCEDURE — 86611 BARTONELLA ANTIBODY: CPT

## 2025-04-22 PROCEDURE — 84439 ASSAY OF FREE THYROXINE: CPT

## 2025-04-22 PROCEDURE — 82043 UR ALBUMIN QUANTITATIVE: CPT

## 2025-04-22 PROCEDURE — 80061 LIPID PANEL: CPT | Performed by: INTERNAL MEDICINE

## 2025-04-22 PROCEDURE — 85007 BL SMEAR W/DIFF WBC COUNT: CPT | Performed by: INTERNAL MEDICINE

## 2025-04-22 PROCEDURE — 83036 HEMOGLOBIN GLYCOSYLATED A1C: CPT | Performed by: INTERNAL MEDICINE

## 2025-04-25 LAB
B HENSELAE IGG TITR SER IF: NEGATIVE TITER
B HENSELAE IGM TITR SER IF: NEGATIVE TITER
B QUINTANA IGG TITR SER IF: NEGATIVE TITER
B QUINTANA IGM TITR SER IF: NEGATIVE TITER

## 2025-04-29 ENCOUNTER — TELEPHONE (OUTPATIENT)
Dept: NEUROLOGY | Facility: CLINIC | Age: 55
End: 2025-04-29
Payer: MEDICARE

## 2025-05-15 ENCOUNTER — OFFICE VISIT (OUTPATIENT)
Dept: NEUROLOGY | Age: 55
End: 2025-05-15
Payer: MEDICARE

## 2025-05-15 VITALS
OXYGEN SATURATION: 98 % | DIASTOLIC BLOOD PRESSURE: 69 MMHG | HEIGHT: 67 IN | HEART RATE: 68 BPM | SYSTOLIC BLOOD PRESSURE: 114 MMHG | BODY MASS INDEX: 39.55 KG/M2 | WEIGHT: 252 LBS

## 2025-05-15 DIAGNOSIS — R41.3 MEMORY LOSS: Primary | ICD-10-CM

## 2025-05-15 DIAGNOSIS — E55.9 VITAMIN D DEFICIENCY: ICD-10-CM

## 2025-05-15 DIAGNOSIS — R41.3 MEMORY LOSS: ICD-10-CM

## 2025-05-15 DIAGNOSIS — H53.9 VISION CHANGES: ICD-10-CM

## 2025-05-15 DIAGNOSIS — H47.10 PAPILLEDEMA: ICD-10-CM

## 2025-05-15 DIAGNOSIS — R53.83 OTHER FATIGUE: ICD-10-CM

## 2025-05-15 DIAGNOSIS — R41.82 ALTERED MENTAL STATUS, UNSPECIFIED ALTERED MENTAL STATUS TYPE: ICD-10-CM

## 2025-05-15 LAB
25(OH)D3 SERPL-MCNC: 63.6 NG/ML
ALBUMIN SERPL-MCNC: 4.2 G/DL (ref 3.5–5.2)
ALP SERPL-CCNC: 76 U/L (ref 35–104)
ALT SERPL-CCNC: 17 U/L (ref 10–35)
ANION GAP SERPL CALCULATED.3IONS-SCNC: 12 MMOL/L (ref 8–16)
AST SERPL-CCNC: 13 U/L (ref 10–35)
BASOPHILS # BLD: 0 K/UL (ref 0–0.2)
BASOPHILS NFR BLD: 0.2 % (ref 0–1)
BILIRUB SERPL-MCNC: 0.2 MG/DL (ref 0.2–1.2)
BUN SERPL-MCNC: 23 MG/DL (ref 6–20)
CALCIUM SERPL-MCNC: 9.4 MG/DL (ref 8.6–10)
CHLORIDE SERPL-SCNC: 102 MMOL/L (ref 98–107)
CO2 SERPL-SCNC: 24 MMOL/L (ref 22–29)
CREAT SERPL-MCNC: 0.7 MG/DL (ref 0.5–0.9)
CRP SERPL-MCNC: 17 MG/L (ref 0–5)
EOSINOPHIL # BLD: 0 K/UL (ref 0–0.6)
EOSINOPHIL NFR BLD: 0.3 % (ref 0–5)
ERYTHROCYTE [DISTWIDTH] IN BLOOD BY AUTOMATED COUNT: 16.2 % (ref 11.5–14.5)
ERYTHROCYTE [SEDIMENTATION RATE] IN BLOOD BY WESTERGREN METHOD: 22 MM/HR (ref 0–25)
GLUCOSE SERPL-MCNC: 150 MG/DL (ref 70–99)
HCT VFR BLD AUTO: 42.4 % (ref 37–47)
HGB BLD-MCNC: 13.4 G/DL (ref 12–16)
HIV-1 P24 AG: NORMAL
HIV1+2 AB SERPLBLD QL IA.RAPID: NORMAL
IMM GRANULOCYTES # BLD: 0.1 K/UL
LYMPHOCYTES # BLD: 1.4 K/UL (ref 1.1–4.5)
LYMPHOCYTES NFR BLD: 10.8 % (ref 20–40)
MCH RBC QN AUTO: 26.5 PG (ref 27–31)
MCHC RBC AUTO-ENTMCNC: 31.6 G/DL (ref 33–37)
MCV RBC AUTO: 84 FL (ref 81–99)
MONOCYTES # BLD: 0.6 K/UL (ref 0–0.9)
MONOCYTES NFR BLD: 4.8 % (ref 0–10)
NEUTROPHILS # BLD: 11.1 K/UL (ref 1.5–7.5)
NEUTS SEG NFR BLD: 83.3 % (ref 50–65)
PLATELET # BLD AUTO: 199 K/UL (ref 130–400)
PMV BLD AUTO: 12.9 FL (ref 9.4–12.3)
POTASSIUM SERPL-SCNC: 3.9 MMOL/L (ref 3.5–5.1)
PROT SERPL-MCNC: 7.1 G/DL (ref 6.4–8.3)
RBC # BLD AUTO: 5.05 M/UL (ref 4.2–5.4)
SODIUM SERPL-SCNC: 138 MMOL/L (ref 136–145)
VIT B12 SERPL-MCNC: 533 PG/ML (ref 232–1245)
WBC # BLD AUTO: 13.3 K/UL (ref 4.8–10.8)

## 2025-05-15 PROCEDURE — 1036F TOBACCO NON-USER: CPT | Performed by: NURSE PRACTITIONER

## 2025-05-15 PROCEDURE — 3017F COLORECTAL CA SCREEN DOC REV: CPT | Performed by: NURSE PRACTITIONER

## 2025-05-15 PROCEDURE — 99214 OFFICE O/P EST MOD 30 MIN: CPT | Performed by: NURSE PRACTITIONER

## 2025-05-15 PROCEDURE — G8427 DOCREV CUR MEDS BY ELIG CLIN: HCPCS | Performed by: NURSE PRACTITIONER

## 2025-05-15 PROCEDURE — 3078F DIAST BP <80 MM HG: CPT | Performed by: NURSE PRACTITIONER

## 2025-05-15 PROCEDURE — G8417 CALC BMI ABV UP PARAM F/U: HCPCS | Performed by: NURSE PRACTITIONER

## 2025-05-15 PROCEDURE — 3074F SYST BP LT 130 MM HG: CPT | Performed by: NURSE PRACTITIONER

## 2025-05-15 RX ORDER — SERTRALINE HYDROCHLORIDE 100 MG/1
100 TABLET, FILM COATED ORAL NIGHTLY
COMMUNITY

## 2025-05-15 RX ORDER — INSULIN LISPRO 100 [IU]/ML
INJECTION, SOLUTION INTRAVENOUS; SUBCUTANEOUS
COMMUNITY
Start: 2025-05-02 | End: 2026-05-03

## 2025-05-15 RX ORDER — CYANOCOBALAMIN 1000 UG/ML
INJECTION, SOLUTION INTRAMUSCULAR; SUBCUTANEOUS
COMMUNITY

## 2025-05-15 RX ORDER — TIRZEPATIDE 15 MG/.5ML
INJECTION, SOLUTION SUBCUTANEOUS
COMMUNITY
Start: 2025-04-10

## 2025-05-15 RX ORDER — ROSUVASTATIN CALCIUM 10 MG/1
10 TABLET, COATED ORAL NIGHTLY
COMMUNITY
Start: 2025-04-28

## 2025-05-15 NOTE — PROGRESS NOTES
[facility]:    Patient: Aliza Prather   :  1970  Age:  54 y.o.  MRN:  [id number]  Today:  5/15/25    Provider: SAKINA Fierro    Chief Complaint:  Chief Complaint   Patient presents with    New Patient    Papilledema     C/O vision issues that started back in March. MRI in April, BH, in PACS.    Headache     C/O headaches that started after the vision issues. Pt states having headache almost every day.      History of Present Illness  Aliza Prather is a 54 y.o. year old female here for evaluation of headaches and papilledema.    She initially perceived a smudge on her glasses on 2025, which she later realized was a visual disturbance in her eye. An immediate consultation with her optometrist led to a suspicion of macular nerve swelling, prompting a referral to an ophthalmologist. The ophthalmologist identified the issue as optic nerve swelling and recommended an MRI, followed by a consultation with a neuro-ophthalmologist. The neuro-ophthalmologist suggested a spinal tap. Concurrently, she began experiencing headaches, which later also affected her right eye on 2025. Despite being prescribed a high dose of steroids by Dr. Sterling and an antibiotic by the ophthalmologist, she reports no improvement in her symptoms. Blood work was ordered by Dr. Sterling, but she is unsure of the results.    She experiences daily headaches, accompanied by neck pain, frontal and lateral head pain, and temple tenderness. She also reports photophobia but no vomiting. She has no prior history of headaches or head injuries. She reports no specific triggers or alleviating factors for her headaches. She experiences dizziness when bending down or lying flat but reports no Valsalva maneuver-related symptoms. She reports no family history of aneurysms or brain bleeds. She also reports memory issues, such as forgetting how to cook a dish she has prepared multiple times, and difficulty tolerating heat. She

## 2025-05-16 ENCOUNTER — RESULTS FOLLOW-UP (OUTPATIENT)
Dept: NEUROLOGY | Age: 55
End: 2025-05-16

## 2025-05-16 DIAGNOSIS — H47.10 PAPILLEDEMA: Primary | ICD-10-CM

## 2025-05-18 LAB
ARSENIC BLD-MCNC: <10 UG/L
B BURGDOR IGG SER QL IB: NEGATIVE
B BURGDOR IGM SER QL IB: NEGATIVE
CADMIUM BLD-MCNC: <1 UG/L
LEAD BLD-MCNC: <2 UG/DL
MERCURY BLD-MCNC: <2.5 UG/L
NUCLEAR IGG SER QL IA: NORMAL

## 2025-05-19 ENCOUNTER — HOSPITAL ENCOUNTER (OUTPATIENT)
Dept: MRI IMAGING | Age: 55
Discharge: HOME | End: 2025-05-19
Payer: MEDICARE

## 2025-05-19 DIAGNOSIS — H53.9 VISION CHANGES: ICD-10-CM

## 2025-05-19 DIAGNOSIS — E55.9 VITAMIN D DEFICIENCY: ICD-10-CM

## 2025-05-19 DIAGNOSIS — R53.83 OTHER FATIGUE: ICD-10-CM

## 2025-05-19 DIAGNOSIS — R41.3 MEMORY LOSS: ICD-10-CM

## 2025-05-19 DIAGNOSIS — H47.10 PAPILLEDEMA: ICD-10-CM

## 2025-05-19 LAB
AQP4 H2O CHANNEL IGG SERPL QL IF: NORMAL
METHYLMALONATE SERPL-SCNC: 0.13 UMOL/L (ref 0–0.4)
RPR SER QL: NORMAL
VIT B1 BLD-MCNC: 193 NMOL/L (ref 70–180)

## 2025-05-19 PROCEDURE — 70544 MR ANGIOGRAPHY HEAD W/O DYE: CPT

## 2025-05-22 ENCOUNTER — HOSPITAL ENCOUNTER (OUTPATIENT)
Dept: GENERAL RADIOLOGY | Age: 55
Discharge: HOME | End: 2025-05-22
Payer: MEDICARE

## 2025-05-22 VITALS
DIASTOLIC BLOOD PRESSURE: 46 MMHG | RESPIRATION RATE: 14 BRPM | OXYGEN SATURATION: 96 % | SYSTOLIC BLOOD PRESSURE: 95 MMHG | HEART RATE: 72 BPM

## 2025-05-22 DIAGNOSIS — R41.3 MEMORY LOSS: Primary | ICD-10-CM

## 2025-05-22 DIAGNOSIS — R41.3 MEMORY LOSS: ICD-10-CM

## 2025-05-22 DIAGNOSIS — H53.9 VISION CHANGES: ICD-10-CM

## 2025-05-22 DIAGNOSIS — H47.10 PAPILLEDEMA: ICD-10-CM

## 2025-05-22 DIAGNOSIS — E55.9 VITAMIN D DEFICIENCY: ICD-10-CM

## 2025-05-22 DIAGNOSIS — R41.82 ALTERED MENTAL STATUS, UNSPECIFIED ALTERED MENTAL STATUS TYPE: ICD-10-CM

## 2025-05-22 DIAGNOSIS — R53.83 OTHER FATIGUE: ICD-10-CM

## 2025-05-22 LAB
APPEARANCE CSF: CLEAR
C GATTII+NEOFOR DNA CSF QL NAA+NON-PROBE: NOT DETECTED
CLOT EVALUATION CSF: ABNORMAL
CMV DNA CSF QL NAA+NON-PROBE: NOT DETECTED
COLOR CSF: COLORLESS
E COLI K1 DNA CSF QL NAA+NON-PROBE: NOT DETECTED
ERYTHROCYTE [DISTWIDTH] IN BLOOD BY AUTOMATED COUNT: 16.3 % (ref 11.5–14.5)
EV RNA CSF QL NAA+NON-PROBE: NOT DETECTED
GLUCOSE CSF-MCNC: 128 MG/DL (ref 40–70)
GP B STREP DNA CSF QL NAA+NON-PROBE: NOT DETECTED
HAEM INFLU DNA CSF QL NAA+NON-PROBE: NOT DETECTED
HCT VFR BLD AUTO: 39.3 % (ref 37–47)
HGB BLD-MCNC: 12.3 G/DL (ref 12–16)
HHV6 DNA CSF QL NAA+NON-PROBE: NOT DETECTED
HSV1 DNA CSF QL NAA+NON-PROBE: NOT DETECTED
HSV2 DNA CSF QL NAA+NON-PROBE: NOT DETECTED
INR PPP: 0.93 (ref 0.88–1.18)
L MONOCYTOG DNA CSF QL NAA+NON-PROBE: NOT DETECTED
MCH RBC QN AUTO: 26.1 PG (ref 27–31)
MCHC RBC AUTO-ENTMCNC: 31.3 G/DL (ref 33–37)
MCV RBC AUTO: 83.4 FL (ref 81–99)
N MEN DNA CSF QL NAA+NON-PROBE: NOT DETECTED
NO DIFFERENTIAL CSF: ABNORMAL
PARECHOVIRUS A RNA CSF QL NAA+NON-PROBE: NOT DETECTED
PLATELET # BLD AUTO: 167 K/UL (ref 130–400)
PMV BLD AUTO: 12.6 FL (ref 9.4–12.3)
PROT CSF-MCNC: 53 MG/DL (ref 15–45)
PROTHROMBIN TIME: 12.4 SEC (ref 12–14.6)
RBC # BLD AUTO: 4.71 M/UL (ref 4.2–5.4)
RBC CSF: 800 /CUMM (ref 0–5)
S PNEUM DNA CSF QL NAA+NON-PROBE: NOT DETECTED
TUBE # CSF: ABNORMAL
VZV DNA CSF QL NAA+NON-PROBE: NOT DETECTED
WBC # BLD AUTO: 12.1 K/UL (ref 4.8–10.8)
WBC CSF: 2 /CUMM (ref 0–8)

## 2025-05-22 PROCEDURE — 62328 DX LMBR SPI PNXR W/FLUOR/CT: CPT

## 2025-05-22 NOTE — DISCHARGE INSTRUCTIONS
[facility] [place]  PATIENT EDUCATION  Lumbar Puncture Discharge Instructions  Care Needed at Home:  · Resume regular diet  · Rest in bed or recliner for the next 12 hours, do not elevate head more than 30 degrees.  · You may remove bandage in 24 hours if no bleeding, keep site clean and dry.  · You may shower the next day.  · Be sure to wash your hands before touching the wound or dressing.  · Drink at least 8-10 ounces of nonalcoholic fluid every hour.  · Do not drive until morning.  · No strenuous activity for 24 hours.  · Very important: No heavy lifting or bending for the remainder of the day.  · May resume Coumadin (warfarin) or Plavix in 4 days.  What Problems Could Happen?  · Headache  · Upset stomach or throwing up  · Damage to nerves and vessels  · Infection  When Should I Call the Doctor?  · Signs of infection: fever of 100.4 or higher, chills or non-healing wound.  · Signs of a wound infection: swelling, redness, warmth around the wound; too much pain when  touched; yellowish, greenish or bloody discharge; foul smell coming from the cut site; cute site  opens up.  · Numbness, weakness and/or tingling of the legs and/or feet or inability to urinate.  · Stiff neck  · A headache that last longer than 24 hours or becomes intolerable, call your doctor.  _____________________________________________________________________________________  Signature of Patient or Responsible Person Date Time  _____________________________________________________________________________________  Signature of Instructing Nurse Date Time

## 2025-05-22 NOTE — PROGRESS NOTES
Pt is being discharged to home with a copy of her discharge instructions.  Pt verbalized understanding of all discharge teaching.  Puncture site remains soft and clean.  Vitals stable.

## 2025-05-22 NOTE — PROGRESS NOTES
Pt is here today for a lumbar puncture.  Pt escorted to [facility].  ID verified by name and .  Procedure explained and questions answered.  Blood drawn and taken to the Lab.  Vitals stable.

## 2025-05-22 NOTE — PROGRESS NOTES
Pt is back from the Radiology Dept.  Pt tolerated lumbar puncture well.  Opening pressure: 10; and 15 cc of fluid were removed.  Puncture site is soft and clean.  Pt is laying flat in the bed.  Pt does have a headache, but she states it is the same as usual.  Pt has fluids to drink at the bedside.

## 2025-05-23 LAB
BACTERIA CSF CULT: NORMAL
BACTERIA SPEC ANAEROBE CULT: NORMAL
GRAM STN SPEC: NORMAL

## 2025-05-24 LAB — B. BURGDORFERI VLSE1/PEPC10 ABS, CSF: 0.1 IV

## 2025-05-25 LAB
ACUTE LEUKEMIA MARKERS SPEC-IMP: NORMAL
ALB CSF/SERPL: 7.9 RATIO (ref 0–9)
ALBUMIN CSF-MCNC: 26 MG/DL (ref 0–35)
ALBUMIN SERPL-MCNC: 3281 MG/DL (ref 3500–5200)
EVENTS COUNTED SPEC: 26 MARKERS
IGG CSF-MCNC: 3.6 MG/DL (ref 0–6)
IGG SERPL-MCNC: 647 MG/DL (ref 768–1632)
IGG SYNTH RATE SER+CSF CALC-MRATE: 4.3 MG/D
IGG/ALB CLEAR SER+CSF-RTO: 0.7 RATIO (ref 0.28–0.66)
IGG/ALB CSF: 0.14 RATIO (ref 0.09–0.25)
MBP CSF-MCNC: 3.82 NG/ML (ref 0–5.5)
OLIGOCLONAL BANDS CSF ELPH-IMP: ABNORMAL
OLIGOCLONAL BANDS CSF ELPH-IMP: NEGATIVE
OLIGOCLONAL BANDS CSF IEF: ABNORMAL BANDS (ref 0–1)
PROF LEUK/LYMPH PHENO 16 OR MORE MARKERS: NORMAL
PROF LEUK/LYMPH PHENO 26 MARKERS: NORMAL
SOURCE: NORMAL
VDRL CSF QL: NON REACTIVE

## 2025-05-26 LAB
ACE CSF-CCNC: 1 U/L (ref 0–2.5)
ALBUMIN CSF ELPH-MCNC: 15.3 MG/DL (ref 8.4–34.2)
ALPHA1 GLOB CSF ELPH-MCNC: 0.9 MG/DL (ref 0–3.1)
ALPHA2 GLOB CSF ELPH-MCNC: 2.8 MG/DL (ref 0–5.4)
B-GLOBULIN CSF ELPH-MCNC: 10.5 MG/DL (ref 0–8.1)
GAMMA GLOB CSF ELPH-MCNC: 2 MG/DL (ref 0–5.4)
P-TAU181/AL BETA42 CSF-RTO: 0.01 {RATIO}
PREALB CSF ELPH-MCNC: 1 MG/DL (ref 0–3.1)
PROT CSF-MCNC: 32.5 MG/DL (ref 15–45)
TAU PROT/AL BETA42 CSF-RTO: 0.12

## 2025-05-28 LAB
EEEV IGG TITR CSF IF: NORMAL {TITER}
LACV IGG TITR CSF IF: NORMAL {TITER}
SLEV IGG TITR CSF IF: NORMAL {TITER}
WEEV IGG TITR CSF IF: NORMAL {TITER}
WNV IGG CSF-ACNC: 0.06 IV

## 2025-05-29 ENCOUNTER — OFFICE VISIT (OUTPATIENT)
Dept: NEUROLOGY | Age: 55
End: 2025-05-29
Payer: MEDICARE

## 2025-05-29 ENCOUNTER — TELEPHONE (OUTPATIENT)
Dept: NEUROLOGY | Facility: CLINIC | Age: 55
End: 2025-05-29
Payer: MEDICARE

## 2025-05-29 VITALS
WEIGHT: 252 LBS | HEART RATE: 78 BPM | OXYGEN SATURATION: 98 % | SYSTOLIC BLOOD PRESSURE: 115 MMHG | HEIGHT: 67 IN | DIASTOLIC BLOOD PRESSURE: 68 MMHG | BODY MASS INDEX: 39.55 KG/M2

## 2025-05-29 DIAGNOSIS — R51.9 NONINTRACTABLE HEADACHE, UNSPECIFIED CHRONICITY PATTERN, UNSPECIFIED HEADACHE TYPE: Primary | ICD-10-CM

## 2025-05-29 LAB
BACTERIA CSF CULT: NORMAL
BACTERIA SPEC ANAEROBE CULT: NORMAL
GRAM STN SPEC: NORMAL

## 2025-05-29 PROCEDURE — G8427 DOCREV CUR MEDS BY ELIG CLIN: HCPCS | Performed by: NURSE PRACTITIONER

## 2025-05-29 PROCEDURE — 99214 OFFICE O/P EST MOD 30 MIN: CPT | Performed by: NURSE PRACTITIONER

## 2025-05-29 PROCEDURE — 1036F TOBACCO NON-USER: CPT | Performed by: NURSE PRACTITIONER

## 2025-05-29 PROCEDURE — G8417 CALC BMI ABV UP PARAM F/U: HCPCS | Performed by: NURSE PRACTITIONER

## 2025-05-29 PROCEDURE — 3074F SYST BP LT 130 MM HG: CPT | Performed by: NURSE PRACTITIONER

## 2025-05-29 PROCEDURE — 3017F COLORECTAL CA SCREEN DOC REV: CPT | Performed by: NURSE PRACTITIONER

## 2025-05-29 PROCEDURE — 3078F DIAST BP <80 MM HG: CPT | Performed by: NURSE PRACTITIONER

## 2025-05-29 RX ORDER — AMITRIPTYLINE HYDROCHLORIDE 10 MG/1
10 TABLET ORAL NIGHTLY
Qty: 90 TABLET | Refills: 0 | Status: SHIPPED | OUTPATIENT
Start: 2025-05-29

## 2025-05-29 NOTE — PROGRESS NOTES
[facility]:    Patient: Aliza Prather   :  1970  Age:  54 y.o.  MRN:  [id number]  Today:  5/15/25    Provider: SAKINA Fierro    Chief Complaint:  Chief Complaint   Patient presents with    Follow-up    Papilledema      Pt states no improvement, at times she feels it's worse. She states still having headaches.     Memory Loss     Pt states memory is still the same.     Results     MRV and LP     History of Present Illness  Aliza Prather is a 54 y.o. year old female here for follow-up of headaches and papilledema.  She is about the same overall, headaches waxing and waning. She has seen ophthalmology again and reports that swelling has significantly decreased and is virtually resolved.  Will request these records.  She has neuro-ophthalmology follow-up [facility].  At prior visit lumbar puncture was completed with normal opening pressure, CSF studies with no clear etiology for papilledema.  CSF labs were possibly skewed by high amount of RBCs, there were several mild abnormalities but nothing that correlated into a clear clinical picture.  MRV normal.  Blood work unremarkable.  Prior MRI brain unremarkable.    As previously discussed her initial symptoms or changes to vision.  She initially perceived a smudge on her glasses on 2025, which she later realized was a visual disturbance in her eye. An immediate consultation with her optometrist led to a suspicion of macular nerve swelling, prompting a referral to an ophthalmologist. The ophthalmologist identified the issue as optic nerve swelling and recommended an MRI, followed by a consultation with a neuro-ophthalmologist. The neuro-ophthalmologist suggested a spinal tap. Concurrently, she began experiencing headaches, which later also affected her right eye on 2025. Despite being prescribed a high dose of steroids by Dr. Sterling and an antibiotic by the ophthalmologist, she reports no improvement in her symptoms. Blood work was

## 2025-05-29 NOTE — TELEPHONE ENCOUNTER
Hub to relay        I called patient to reschedule her cancelled appointment with Nicole. I patient calls back please reschedule.

## 2025-06-06 ENCOUNTER — TELEPHONE (OUTPATIENT)
Dept: NEUROLOGY | Facility: CLINIC | Age: 55
End: 2025-06-06
Payer: MEDICARE

## 2025-06-06 NOTE — TELEPHONE ENCOUNTER
Called pt to remind them of their appointment on 6/9/25 at 3:00. Pt confirmed they would be here   [Maximal Pain Intensity: 8/10] : 8/10 [Least Pain Intensity: 8/10] : 8/10 [Pain Description/Quality: ___] : Pain description/quality: [unfilled] [Pain Duration: ___] : Pain duration: [unfilled] [Pain Location: ___] : Pain Location: [unfilled] [Pain Interferes with ADLs] : Pain interferes with activities of daily living. [Adjuvant (neuroleptics)] : adjuvant (neuroleptics) [70: Cares for self; unalbe to carry on normal activity or do active work.] : 70: Cares for self; unable to carry on normal activity or do active work.

## 2025-06-09 ENCOUNTER — OFFICE VISIT (OUTPATIENT)
Dept: NEUROLOGY | Facility: CLINIC | Age: 55
End: 2025-06-09
Payer: MEDICARE

## 2025-06-09 VITALS
WEIGHT: 250 LBS | HEIGHT: 67 IN | HEART RATE: 92 BPM | DIASTOLIC BLOOD PRESSURE: 76 MMHG | OXYGEN SATURATION: 97 % | BODY MASS INDEX: 39.24 KG/M2 | SYSTOLIC BLOOD PRESSURE: 136 MMHG

## 2025-06-09 DIAGNOSIS — H53.9 CHANGES IN VISION: Primary | ICD-10-CM

## 2025-06-09 PROCEDURE — 1159F MED LIST DOCD IN RCRD: CPT

## 2025-06-09 PROCEDURE — 99204 OFFICE O/P NEW MOD 45 MIN: CPT

## 2025-06-09 PROCEDURE — 1160F RVW MEDS BY RX/DR IN RCRD: CPT

## 2025-06-09 PROCEDURE — 3078F DIAST BP <80 MM HG: CPT

## 2025-06-09 PROCEDURE — 3075F SYST BP GE 130 - 139MM HG: CPT

## 2025-06-09 RX ORDER — AMITRIPTYLINE HYDROCHLORIDE 10 MG/1
10 TABLET ORAL NIGHTLY
COMMUNITY

## 2025-06-09 NOTE — PROGRESS NOTES
Neurology Consult Note    [facility]  [address], [address]  [place] [address]  Phone: [phone]  Fax: [phone]    Referring Provider:   No ref. provider found  Primary Care Provider:  Tricia Stokes DO    Reason for Consult:  Papilledema  Subjective      History of Present Illness  55-year-old female referred to our clinic by the ophthalmology group for evaluation of papilledema.  Patient saw them on 3/28/2025 for a retina evaluation per Dr. Pearl.  Patient was noting a black spot in her central vision OS.  Review of the exam shows 2+ edema of the right eye and 3+ edema of the left eye.  It was felt to represent possible diabetic papillitis only very mild retinopathy otherwise.  He felt optic neuritis or increased ICP was more likely.    Review of records show she has seen numerous other providers for the same complaints.  She specifically saw Dr. Norm Stevens [facility] eye specialist on 5/6/2025.  Review that record shows blurry vision started March 23 which involve the left eye only.  She noted on a steroid the right eye was involved.  He diagnosed her sequential nonarteritic anterior ischemic optic neuropathy.  She noted no worsening of vision.  He noted no giant cell arteritis symptoms.  He did order a ESR and a CRP.  No mention of further workup.  She will start on oral prednisone starting at 60 mg.  She return to clinic in 1 month.    She also saw IVETTE Higgins with [facility] neurology here in [place].  Initial encounter was on 5/15/2025.  Patient reported the papilledema as well as headaches.  These were described as near daily accompanied by neck pain, frontal and lateral head pain as well as temporal tenderness.  She reported photophobia.  No vomiting.  She does experience dizziness with bending down or lying flat.  Patient also reported memory issues.  Patient did undergo serum and cerebrospinal fluid testing.  A MRV was also  performed.  Lumbar puncture was negative for oligoclonal bands.  Meningitis panel negative.  Full labs will be attached below.    She did see Ms. Burton again on 5/29/2025.  Was noted her opening pressure was normal on the lumbar puncture.  CSF studies negative for etiology of papilledema.  MRV normal.  Blood work unremarkable.  Recommend for patient to seek a second opinion from [facility] due to unclear etiology of papilledema.  She was started on Elavil for headaches.    Today patient presents by herself.  She endorses to be in onset of vision changes that started in March.  She notes she is ophthalmology who saw she had papilledema.  She noted the blurred vision then progressed into her right eye as well.  She also notes that she has had painful eye movements which hurt in the lateral aspects of her ocular muscles however this has been present for several years.  Since then she tells me she has had a large workup that has been overall normal.  No but is able to give her answers.  She is set to see her neuro-ophthalmologist tomorrow for follow-up.  She also tells me she was referred to [place] [facility] for second opinion.  She asked me today if I can send in a urgent referral for her.  Patient Active Problem List   Diagnosis    Type 2 diabetes mellitus with hyperglycemia, with long-term current use of insulin    Essential hypertension    Class 3 severe obesity with body mass index (BMI) of 40.0 to 44.9 in adult    Hyperadrenalism    DDD (degenerative disc disease), cervical    Cervical spondylosis with radiculopathy    Cervicalgia    Cervical radiculopathy due to degenerative joint disease of spine        Past Medical History:   Diagnosis Date    Arthritis     Cervical spondylosis with radiculopathy 08/17/2022    Cervicalgia 08/17/2022    Delayed emergence from general anesthesia     GERD (gastroesophageal reflux disease)     History of hyperadrenalism     Hyperlipidemia     Hypertension      Shoulder pain, bilateral 05/2023    Sleep apnea     Does Not use CPAP    Strep throat 05/02/2023    Type 2 diabetes mellitus with hyperglycemia, with long-term current use of insulin 10/25/2018        Social History     Socioeconomic History    Marital status: Single   Tobacco Use    Smoking status: Never    Smokeless tobacco: Never   Vaping Use    Vaping status: Never Used   Substance and Sexual Activity    Alcohol use: No    Drug use: No    Sexual activity: Defer     Birth control/protection: Hysterectomy        Allergies   Allergen Reactions    Onion Anaphylaxis and Angioedema    Ultram [Tramadol Hcl] Shortness Of Breath          Current Outpatient Medications:     ALPRAZolam (XANAX) 1 MG tablet, Take 1 tablet by mouth 2 (Two) Times a Day As Needed for Anxiety., Disp: , Rfl:     amitriptyline (ELAVIL) 10 MG tablet, Take 1 tablet by mouth Every Night., Disp: , Rfl:     bumetanide (BUMEX) 1 MG tablet, Take 1 tablet by mouth Daily As Needed (SWELLING)., Disp: , Rfl:     Continuous Blood Gluc Sensor (FreeStyle Taiwo 2 Sensor) misc, 1 each Every 14 (Fourteen) Days., Disp: 1 each, Rfl: 11    Continuous Blood Gluc Sensor (FreeStyle Taiwo 2 Sensor) misc, 1 each Every 14 (Fourteen) Days., Disp: 2 each, Rfl: 11    fluconazole (DIFLUCAN) 150 MG tablet, TAKE 1 TABLET BY MOUTH ONE TIME PER WEEK, Disp: 4 tablet, Rfl: 8    HumaLOG 100 UNIT/ML injection, INJECT 90 UNITS DAILY THROUGH PUMP, Disp: 30 mL, Rfl: 3    Insulin Disposable Pump (Omnipod 5 G6 Pod, Gen 5,) misc, 1 each Every 72 (Seventy-Two) Hours., Disp: 10 each, Rfl: 11    lisinopril (PRINIVIL,ZESTRIL) 10 MG tablet, Take 1 tablet by mouth Daily., Disp: , Rfl:     naloxone (NARCAN) 4 MG/0.1ML nasal spray, Call 911. Don't prime. [place] in 1 nostril for overdose. Repeat in 2-3 minutes in other nostril if no or minimal breathing/responsiveness., Disp: 2 each, Rfl: 0    omeprazole (priLOSEC) 40 MG capsule, Take 1 capsule by mouth Daily., Disp: , Rfl:      "oxyCODONE-acetaminophen (PERCOCET)  MG per tablet, Take 1 tablet by mouth Every 6 (Six) Hours As Needed for Severe Pain ., Disp: 60 tablet, Rfl: 0    pregabalin (LYRICA) 150 MG capsule, Take 1 capsule by mouth 2 (Two) Times a Day., Disp: , Rfl:     rosuvastatin (CRESTOR) 10 MG tablet, TAKE ONE TABLET BY MOUTH AT BEDTIME, Disp: 30 tablet, Rfl: 11    Synjardy XR 5-1000 MG tablet sustained-release 24 hour, TAKE TWO TABLETS BY MOUTH DAILY WITH BREAKFAST, Disp: 60 tablet, Rfl: 11    Tirzepatide 15 MG/0.5ML solution pen-injector, Inject 15 mg (1 pen) under the skin into the appropriate area as directed 1 (One) Time Per Week., Disp: 2 mL, Rfl: 11    Continuous Blood Gluc  (SocialPicksyle Taiwo 2 [place]) device, 1 each Continuous. Use as indicated for glucose monitoring, Disp: 1 each, Rfl: 1    HYDROcodone-acetaminophen (NORCO) 5-325 MG per tablet, Take 1 tablet by mouth Every 6 (Six) Hours As Needed for Severe Pain. (Patient not taking: Reported on 6/9/2025), Disp: 28 tablet, Rfl: 0    melatonin 5 MG tablet tablet, Take 1 tablet by mouth Every Night. (Patient not taking: Reported on 6/9/2025), Disp: , Rfl:     ondansetron (ZOFRAN) 4 MG tablet, Take 1 tablet by mouth Every 8 (Eight) Hours As Needed for Nausea or Vomiting. (Patient not taking: Reported on 6/9/2025), Disp: 20 tablet, Rfl: 0    Tirzepatide (Mounjaro) 12.5 MG/0.5ML solution pen-injector, Inject 12.5 mg (1 pen) under the skin into the appropriate area as directed 1 (One) Time Per Week. (Patient not taking: Reported on 6/9/2025), Disp: 2 mL, Rfl: 6       Objective   Vital Signs:   /76   Pulse 92   Ht 170.2 cm (67\")   Wt 113 kg (250 lb)   SpO2 97%   BMI 39.16 kg/m²       Physical Exam   Neurological Exam    Result Review :   The following data was reviewed by: IVETTE Gutierrez on 06/09/2025:         MRI Brain With & Without Contrast (04/01/2025 11:12)  Study Result    Narrative & Impression   MRI BRAIN W WO CONTRAST-, MRI ORBIT FACE NECK " W WO CONTRAST- 4/1/2025  9:20 AM     HISTORY: H47.10; H47.10-Unspecified papilledema     TECHNIQUE:   1. Multisequence, multiplanar MRI of the orbits before and after the  intravenous administration of Vueway contrast.  2. Multisequence, multiplanar MRI of the brain before and after the  intravenous administration of Vueway contrast.     COMPARISON: None     FINDINGS:     No diffusion signal abnormality. No intra-axial or extra-axial  hemorrhage. No intracranial mass lesion or mass effect. Minimal chronic  small vessel ischemic change in the supratentorial white matter.  Ventricles are nondilated. Posterior fossa structures are unremarkable.  Pituitary gland and sella are unremarkable. Central arterial flow voids  are well preserved. Dural sinuses are patent. Pituitary gland is normal  for age.     Globes are symmetric. Bilateral crystalline lenses which are in good  position. No proptosis. Optic nerves appear symmetric in caliber.  Assessment of the nerve signal is somewhat limited by motion artifact. I  do not see any obvious nerve edema or pathologic enhancement. Expected  fluid signal within the optic nerve sheaths. There is no horizontal or  vertical tortuosity of the nerves.     Intraconal and extraconal fat signal is normal. No enhancing orbital  masses or orbital inflammatory changes/pseudotumor. Extraocular muscles  are normal in size, appear symmetric and enhance normally. Lacrimal  glands are unremarkable. Superior ophthalmic veins are patent. Normal  fat signal at the orbital apices. Cisternal segments of the optic nerves  and the optic chiasm are grossly unremarkable. Again the nerve and  chiasm signal assessment is somewhat limited by motion artifact.     Paranasal sinuses are clear. Normal fat signal in the pterygopalatine  fossa. Normal fat planes in the  spaces.     IMPRESSION:  1.  No intracranial mass lesion, pathologic enhancement or  hydrocephalus. Minimal chronic small vessel ischemic  change. Otherwise  normal brain MRI. No strong evidence for underlying intracranial  pseudotumor in terms of MRI findings (transverse sinus stenosis, empty  sella, slitlike ventricles etc.). No dural sinus thrombus.  2.  No orbital masses or inflammatory changes. Orbital fat signal is  normal. Optic nerves appear symmetric in caliber. Nerve signal appears  grossly normal, assessment slightly limited by motion artifact.           This report was signed and finalized on 4/1/2025 12:23 PM by Dr Mark Yee.               MRI Orbit Face Neck With & Without Contrast (04/01/2025 11:12)  Study Result    Narrative & Impression   MRI BRAIN W WO CONTRAST-, MRI ORBIT FACE NECK W WO CONTRAST- 4/1/2025  9:20 AM     HISTORY: H47.10; H47.10-Unspecified papilledema     TECHNIQUE:   1. Multisequence, multiplanar MRI of the orbits before and after the  intravenous administration of Vueway contrast.  2. Multisequence, multiplanar MRI of the brain before and after the  intravenous administration of Vueway contrast.     COMPARISON: None     FINDINGS:     No diffusion signal abnormality. No intra-axial or extra-axial  hemorrhage. No intracranial mass lesion or mass effect. Minimal chronic  small vessel ischemic change in the supratentorial white matter.  Ventricles are nondilated. Posterior fossa structures are unremarkable.  Pituitary gland and sella are unremarkable. Central arterial flow voids  are well preserved. Dural sinuses are patent. Pituitary gland is normal  for age.     Globes are symmetric. Bilateral crystalline lenses which are in good  position. No proptosis. Optic nerves appear symmetric in caliber.  Assessment of the nerve signal is somewhat limited by motion artifact. I  do not see any obvious nerve edema or pathologic enhancement. Expected  fluid signal within the optic nerve sheaths. There is no horizontal or  vertical tortuosity of the nerves.     Intraconal and extraconal fat signal is normal. No enhancing  orbital  masses or orbital inflammatory changes/pseudotumor. Extraocular muscles  are normal in size, appear symmetric and enhance normally. Lacrimal  glands are unremarkable. Superior ophthalmic veins are patent. Normal  fat signal at the orbital apices. Cisternal segments of the optic nerves  and the optic chiasm are grossly unremarkable. Again the nerve and  chiasm signal assessment is somewhat limited by motion artifact.     Paranasal sinuses are clear. Normal fat signal in the pterygopalatine  fossa. Normal fat planes in the  spaces.     IMPRESSION:  1.  No intracranial mass lesion, pathologic enhancement or  hydrocephalus. Minimal chronic small vessel ischemic change. Otherwise  normal brain MRI. No strong evidence for underlying intracranial  pseudotumor in terms of MRI findings (transverse sinus stenosis, empty  sella, slitlike ventricles etc.). No dural sinus thrombus.  2.  No orbital masses or inflammatory changes. Orbital fat signal is  normal. Optic nerves appear symmetric in caliber. Nerve signal appears  grossly normal, assessment slightly limited by motion artifact.           This report was signed and finalized on 4/1/2025 12:23 PM by Dr Mark Yee.     MRI Angiogram Head Without Contrast (05/19/2025 16:01 EDT)  MRI Angiogram Head Without Contrast  Order: [id number]  Impression    Impression:  Normal MRV head.      ______________________________________  Electronically signed by: BERT WILLS D.O.  Date:     05/20/2025  Time:    08:24  Narrative    EXAMINATION: MRI VENOGRAM BRAIN WITHOUT IV CONTRAST.    History: Memory loss, papilledema    Technique: MRI venogram of the brain without IV contrast. 3D/MIP/VR images were provided.    Comparison: Brain MRI 4/1/2025    Findings:  Dural venous sinsuses and major cortical/deep veins are patent. Cavernous sinuses are clear. Ventricles appropriate in size. No mass lesion or midline shift.  Exam End: 05/19/25 16:01 Last Resulted: 05/20/25  09:26   Received From: [facility]  Result Received: 06/09/25 16:00     FL LUMBAR PUNCTURE DIAG (05/22/2025 16:04 EDT)  FL LUMBAR PUNCTURE DIAG  Order: [id number]  Impression    Satisfactory fluoroscopic guided lumbar puncture.      ______________________________________  Electronically signed by: LACI PRECIADO M.D.  Date:     05/22/2025  Time:    15:15  Narrative    EXAM: FLUOROSCOPIC GUIDED LUMBAR PUNCTURE    HISTORY: Papilledema, memory loss    Fluoroscopy time 17 seconds    Radiation dose 0.168 mGy meter square    COMPARISON: None.    FINDINGS: Time out 1445 hours.    Written and verbal informed consent was obtained. Complications of bleeding, infection and headache were discussed.    The patient was placed prone on the fluoroscopy table. Fluoroscopy was performed and the optimal site for percutaneous approach was determined and the skin was marked.    The back was prepped and draped in a sterile manner.    5 ml of 1% lidocaine was injected for local anesthesia.    A 20 gauge spinal needle was advanced in the lumbar subarachnoid space using fluoroscopic guidance at the L4-L5 level.    The patient was rolled into the left lateral decubitus    The stylet was removed. Opening pressure was 10 cm.    Initially 2.5 ml of spinal fluid was collected.  Next 2.5 ml was collected into the special Alzheimer test container. This container was immediately placed on ice. Then three additional tubes for a total of 15 cc of clear cerebrospinal fluid was  collected and sent to the laboratory.    The stylet was replaced and the needle was removed.    There are no immediate complaints or complications.  Exam End: 05/22/25 16:04 Last Resulted: 05/22/25 16:20   Received From: [facility]  Result Received: 06/09/25 16:00       OFFICE NOTES FROM OTHER PROVIDERS ASSESSING PAPILLEDEMA     PROGRESS NOTES - SCAN - PROG NOTE_THE OPTHALMOLOGY GRP_03/28/25 (03/28/2025)         Progress Notes  -  documented in this encounter   Jarred Stevens MD - 05/06/2025 10:10 AM EDT  Formatting of this note is different from the original.  Subjective  Patient ID: Lucila Kim is a 54 y.o. female.    HPI  Pt in office for papilledema referral, pt c/o of increase in blurry vision OS starting march 23rd,it was a level 3 pt stated her right eye followed on [facility] and when she was last seen the right is a level 2, pt had a MRI done was told by the eye institute in [place] that it improved. Pt states her new medications are meloxicam and that she was on steroids in jan/february  Last edited by ARIAS Guadarrama on 5/6/2025 11:23 AM.      No current outpatient medications on file. (Ophthalmic Drugs)    No current facility-administered medications for this visit. (Ophthalmic Drugs)    Current Outpatient Medications (Other)  Medication Sig  albuterol 108 (90 Base) MCG/ACT inhaler INHALE 2 PUFFS EVERY 4 HOURS BY INHALATION ROUTE AS NEEDED, FOR WHEEZE AND TIGHT COUGH.  ALPRAZolam (Xanax) 1 MG tablet TAKE 1 TABLET BY MOUTH TWICE A DAY AS NEEDED. 01/29/2025  azithromycin (Zithromax) 250 MG tablet TAKE 2 TABLETS BY MOUTH TODAY, THEN TAKE 1 TABLET DAILY FOR 4 DAYS AS DIRECTED  bumetanide (Bumex) 1 MG tablet BUMETANIDE 1 MG TABLET TAKE 1 TABLET BY MOUTH EVERY DAY  cephalexin (Keflex) 500 MG capsule TAKE 1 CAPSULE BY MOUTH EVERY 6 HOURS FOR 7 DAYS  cholecalciferol (Vitamin D-3) 1.25 MG (12091 UT) capsule TAKE 1 CAPSULE BY MOUTH ONE TIME PER WEEK  clobetasol (Temovate) 0.05 % ointment  clobetasol (Temovate) 0.05 % ointment APPLY A THIN LAYER TO THE AFFECTED AREAS ON THE HANDS TWICE DAILY WHEN AND WHERE RASH IS PRESENT.  Continuous Glucose Sensor (Dexcom G6 Sensor) misc APPLY TO THE SKIN EVERY 10 DAYS  Continuous Glucose Transmitter (Dexcom G6 transmitter) misc 1 each.  Continuous Glucose Transmitter (Dexcom G6 transmitter) misc 1 EACH BY DOES NOT APPLY ROUTE FOLLOW INSTRUCTIONS PROVIDED BY YOUR PHYSICIAN  CIMETIDINE 200 MG  tablet Take 1 tablet (200 mg total) by mouth 2 (two) times a day.  predniSONE (Deltasone) 10 MG tablet 60 mgs for 5 days, then 40 mgs for 5 days then 20 mgs for 5 days    No current facility-administered medications for this visit. (Other)    Objective  Base Eye Exam    Visual Acuity (Snellen - Linear)    Right Left  Dist cc 20/20 -1 20/40  Dist ph cc NI    Correction: Glasses          Tonometry (Tonopen, 11:29 AM)    Right Left  Pressure 8 10          Pupils    Pupils Dark Light Shape React  Right PERRLA 3 2 Round Brisk  Left PERRLA 3 2 Round Sluggish          Visual Fields    Left Right  Full Full          Extraocular Movement    Right Left  Full Full          Neuro/Psych    Oriented x3: Yes  Mood/Affect: Normal          Dilation    Both eyes: 1.0% Mydriacyl, 2.5% Phenylephrine @ 12:22 PM              Additional Tests    Color    Right Left  Ishihara 10 / 13 4 / 13              Slit Lamp and Fundus Exam    Slit Lamp Exam    Right Left  Lids/Lashes Normal Normal  Conjunctiva/Sclera White and quiet White and quiet  Cornea Clear Clear  Anterior Chamber Deep and quiet Deep and quiet  Iris Round and reactive Round and reactive  Lens NS +2 NS +2  Anterior Vitreous Normal Normal          Fundus Exam    Right Left  Disc 360 swelling tr swelling  Macula Normal Normal  Vessels Normal Normal              Assessment/Plan  Sequential NAION OU (OS then OD)  Patient started to notice a smudge-like vision when was in the [employer] OS 3/25/25  No worsening of vision since then  No GCA symptoms  Outside [facility] 4/2024 with inferior hemifield defect OS - inferior nasal defect OD  RNFL today 145/105 um    MRI brain and orbits w and wo contrast 4/1/25  1. No intracranial mass lesion, pathologic enhancement or hydrocephalus. Minimal chronic small vessel ischemic change. Otherwise  normal brain MRI. No strong evidence for underlying intracranial pseudotumor in terms of MRI findings (transverse sinus stenosis, empty sella, slitlike ventricles  etc.). No dural sinus thrombus.  2. No orbital masses or inflammatory changes. Orbital fat signal is normal. Optic nerves appear symmetric in caliber. Nerve signal appears grossly normal, assessment slightly limited by motion artifact.    PMH  DM  HTN    Plan  Will order ESR and CRP  Start PO pred (60 mg x5, 40 mg x5, 20 mg x5)  RTC in 1 month. Repeat RNFL and HVF 30-2    2. 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 Dry Eye syndrome  Recommend AT    JARRED KIM MD  [facility]  5/6/2025      Electronically signed by Jarred Kim MD at 05/12/2025 11:10 AM EDT      Reason for Visit    Reason for Visit -  Reason Comments   New Patient     Papilledema C/O vision issues that started back in March. MRI in April, BHP, in PACS.   Headache C/O headaches that started after the vision issues. Pt states having headache almost every day.        Eval and Treat (Urgent) - Pending Review  Reason for Visit - Eval and Treat (Urgent) - Pending Review  Specialty Diagnoses / Procedures Referred By Contact Referred To Contact   Neurology Diagnoses   Papilledema   Headache, unspecified    Anibal Pantoja MD   [address] [place] [address]   Phone: tel:[phone]   fax:[phone]  Keyona Burton APRN - CNP   [address] [place] [address]   Phone: tel:[phone]   fax:[phone]      Reason for Visit - Eval and Treat (Urgent) - Pending Review  Referral ID Status Reason Start Date Expiration Date Visits Requested Visits Authorized   [id number] Pending Review   4/18/2025 4/18/2026 1 1        Encounter Details    Encounter Details  Date Type Department Care Team (Latest Contact Info) Description   05/15/2025 11:00 AM CDT Office Visit [facility] Neurology   [address] [place] [address]   [phone]  Keyona Burton APRN - CNP   [address] [place], [place] [id number] [phone]  "(Work)   [phone] (Fax)  Memory loss (Primary Dx);  Papilledema;  Vision changes;  Vitamin D deficiency;  Other fatigue;  Altered mental status, unspecified altered mental status type     Social History  - documented as of this encounter   Social History  Tobacco Use Types Packs/Day Years Used Date   Smoking Tobacco: Never           Smokeless Tobacco: Never             Social History  Alcohol Use Standard Drinks/Week Comments   Never 0 (1 standard drink = 0.6 oz pure alcohol)       Social History  AUDIT-C Answer Date Recorded   Frequency of Alcohol Consumption Never 05/13/2019   Average Number of Drinks Not on file 05/13/2019   Frequency of Binge Drinking Not on file 05/13/2019     Social History  PHQ-2 Answer Date Recorded   PHQ-2 Score 0 05/13/2019     Social History  Pregnant Comments   No       Social History  Sex and Gender Information Value Date Recorded   Sex Assigned at Birth Female 03/23/2025 11:05 AM EDT   Legal Sex Female [birth year]10/2014 6:11 AM EST   Gender Identity Not on file     Sexual Orientation Not on file       Last Filed Vital Signs  - documented in this encounter   Last Filed Vital Signs  Vital Sign Reading Time Taken Comments   Blood Pressure 114/69 05/15/2025 11:00 AM CDT     Pulse 68 05/15/2025 11:00 AM CDT     Temperature - -     Respiratory Rate - -     Oxygen Saturation 98% 05/15/2025 11:00 AM CDT     Inhaled Oxygen Concentration - -     Weight 114.3 kg (252 lb) 05/15/2025 11:00 AM CDT     Height 170.2 cm (5' 7\") 05/15/2025 11:00 AM CDT     Body Mass Index 39.47 05/15/2025 11:00 AM CDT       Progress Notes  - documented in this encounter   Table of Contents for Progress Notes   Keyona Burton APRN - CNP - 05/15/2025 11:28 AM CDT   Natalia Solis - 05/15/2025 11:00 AM CDT      Keyona Burton APRN - CNP - 05/15/2025 11:28 AM CDT  Formatting of this note is different from the original.  Images from the original note were not included.      [facility]:    Patient: Lucila LOPEZ " Julio  : 1970  Age: 54 y.o.  MRN: [id number]  Today: 5/15/25    Provider: IVETTE Duque    Chief Complaint:  Chief Complaint  Patient presents with  New Patient  Papilledema  C/O vision issues that started back in March. MRI in April, BHP, in PACS.  Headache  C/O headaches that started after the vision issues. Pt states having headache almost every day.    History of Present Illness  Lucila Kim is a 54 y.o. year old female here for evaluation of headaches and papilledema.    She initially perceived a smudge on her glasses on 2025, which she later realized was a visual disturbance in her eye. An immediate consultation with her optometrist led to a suspicion of macular nerve swelling, prompting a referral to an ophthalmologist. The ophthalmologist identified the issue as optic nerve swelling and recommended an MRI, followed by a consultation with a neuro-ophthalmologist. The neuro-ophthalmologist suggested a spinal tap. Concurrently, she began experiencing headaches, which later also affected her right eye on 2025. Despite being prescribed a high dose of steroids by Dr. Fabian and an antibiotic by the ophthalmologist, she reports no improvement in her symptoms. Blood work was ordered by Dr. Fabian, but she is unsure of the results.    She experiences daily headaches, accompanied by neck pain, frontal and lateral head pain, and temple tenderness. She also reports photophobia but no vomiting. She has no prior history of headaches or head injuries. She reports no specific triggers or alleviating factors for her headaches. She experiences dizziness when bending down or lying flat but reports no Valsalva maneuver-related symptoms. She reports no family history of aneurysms or brain bleeds. She also reports memory issues, such as forgetting how to cook a dish she has prepared multiple times, and difficulty tolerating heat. She continues to experience vision changes in both eyes.    PAST  MEDICAL HISTORY: She is a type 2 diabetic with generally well-controlled blood sugar levels, however she was on steroids recently. Her last A1c was 9.4 in the previous month.    SOCIAL HISTORY  Occupation: Disabled    FAMILY HISTORY  - Negative for aneurysms or brain bleeds.    PAST MEDICAL HISTORY:    Medical History:    Diagnosis Date  Anxiety  Type 2 diabetes mellitus without complication (HCC)    Surgical History:    Procedure Laterality Date   SECTION   GASTRIC BAND   HYSTERECTOMY, TOTAL ABDOMINAL (CERVIX REMOVED)  Still has tubes  SHOULDER SURGERY Left   rotator cuff repair    Current Medications:  Current Outpatient Medications  Medication Sig Dispense Refill  cyanocobalamin 1000 MCG/ML injection INJECT 1 ML (1,000 MCG) INTO THE MUSCLE EVERY 30 DAYS  MOUNJARO 15 MG/0.5ML SOAJ pen  sertraline (ZOLOFT) 100 MG tablet Take 1 tablet by mouth nightly  rosuvastatin (CRESTOR) 10 MG tablet Take 1 tablet by mouth nightly  insulin lispro, 1 Unit Dial, (HUMALOG/ADMELOG) 100 UNIT/ML SOPN Indications: Type 2 Diabetes Up to 20 units qac TID as follows : 1unit for every 5 grams of carbohydrate you consume plus 1 unit per 20 mg per dl above 140  meloxicam (MOBIC) 7.5 MG tablet Take 1 tablet by mouth daily  ALPRAZolam (XANAX) 1 MG tablet Take 1 tablet by mouth 2 times daily as needed.  Continuous Glucose Transmitter (DEXCOM G6 TRANSMITTER) MISC 1 EACH BY DOES NOT APPLY ROUTE FOLLOW INSTRUCTIONS PROVIDED BY YOUR PHYSICIAN  docusate sodium (COLACE) 100 MG capsule Take 1 capsule by mouth daily  SYNJARDY XR 5-1000 MG TB24 Take 2 tablets by mouth daily  enalapril (VASOTEC) 10 MG tablet Take 1 tablet by mouth daily  vitamin D (ERGOCALCIFEROL) 1.25 MG (27092 UT) CAPS capsule TAKE ONE CAPSULE BY MOUTH EVERY WEEK  fluconazole (DIFLUCAN) 150 MG tablet TAKE 1 TABLET BY MOUTH ONE TIME PER WEEK  ibuprofen (ADVIL;MOTRIN) 800 MG tablet Take 1 tablet by mouth 3 times daily as needed  Insulin Disposable Pump (OMNIPOD 5  [id number] PODS GEN 5) MISC 1 EACH BY DOES NOT APPLY ROUTE EVERY 72 HOURS  lisinopril (PRINIVIL;ZESTRIL) 10 MG tablet Take 1 tablet by mouth nightly  metFORMIN (GLUCOPHAGE) 1000 MG tablet Take 1 tablet by mouth 2 times daily (with meals)  Insulin Glargine (TOUJEO SOLOSTAR SC) Inject 100 mg into the skin daily  bumetanide (BUMEX) 1 MG tablet Take 1 tablet by mouth as needed  omeprazole (PRILOSEC) 40 MG delayed release capsule Take 1 capsule by mouth daily  Melatonin 10 MG TABS Take 10 mg by mouth nightly  oxyCODONE-acetaminophen (PERCOCET)  MG per tablet Take 1 tablet by mouth every 8 hours as needed for Pain (Pain management- [facility]).  pregabalin (LYRICA) 75 MG capsule Take 1 capsule by mouth in the morning and 1 capsule in the evening.    No current facility-administered medications for this visit.    Allergies:  Onion, Tramadol, and Ultram [tramadol hcl]    SOCIAL HISTORY:  Social History    Socioeconomic History  Marital status: Single  Spouse name: Not on file  Number of children: Not on file  Years of education: Not on file  Highest education level: Not on file  Occupational History  Not on file  Tobacco Use  Smoking status: Never  Smokeless tobacco: Never  Vaping Use  Vaping status: Never Used  Substance and Sexual Activity  Alcohol use: Never  Drug use: Never  Sexual activity: Not on file  Other Topics Concern  Not on file  Social History Narrative  Not on file    Social Drivers of Health    Financial Resource Strain: Not on file  Food Insecurity: Not on file  Transportation Needs: Not on file  Physical Activity: Not on file  Stress: Not on file  Social Connections: Unknown (10/10/2023)  Received from [facility]  Family and Community Support  Help with Day-to-Day Activities: Not on file  Lonely or Isolated: Not on file  Intimate Partner Violence: Unknown (5/25/2024)  Received from [facility]  Abuse Screen  Unsafe at Home or Work/School: Not on file  Feels Threatened by Someone?:  Not on file  Does Anyone Keep You from Contacting Others or Doint Things Outside the Home?: Not on file  Physical Sign of Abuse Present: Not on file  Housing Stability: Unknown (10/10/2023)  Received from [facility]  Housing Stability  Current Living Arrangements: Not on file  Potentially Unsafe Housing Conditions: Not on file    FAMILY HISTORY:    Problem Relation Age of Onset  Diabetes Father  Cancer Father  Lung cancer  Cancer Paternal Grandfather  unsure  Stomach Cancer Paternal Aunt  Colon Cancer Neg Hx  Colon Polyps Neg Hx  Esophageal Cancer Neg Hx  Liver Cancer Neg Hx    REVIEW OF SYSTEMS:  Constitutional: []Fever []Sweats []Chills [] Recent Injury [x] Denies all unless marked  HEENT:[]Headache [] Head Injury [] Hearing Loss [] Sore Throat [] Ear Ache [x] Denies all unless marked  Spine: [] Neck pain [] Back pain [] Sciaticia [x] Denies all unless marked  Cardiovascular:[]Heart Disease []Palpitations [] Chest Pain [x] Denies all unless marked  Pulmonary: []Shortness of Breath []Cough [x] Denies all unless marked  Psychiatric/Behavioral:[] Depression [] Anxiety [x] Denies all unless marked  Gastrointestinal: []Nausea []Vomiting []Abdominal Pain []Constipation []Diarrhea [x] Denies all unless marked  Genitourinary: [] Frequency [] Urgency [] Dysuria [] Incontinence [x] Denies all unless marked  Extremities: []Pain []Swelling [x] Denies all unless marked  Musculoskeletal: [] Myalgias [] Joint Pain [] Arthritis [] Muscle Cramps [] Muscle Twitches [x] Denies all unless marked  Sleep: []Insomnia[]Snoring []Restless Legs []Sleep Apnea []Daytime Sleepiness [x] Denies all unless marked  Skin:[] Rash [] Color Change [x] Denies all unless marked  Neurological:[]Visual Disturbance [] Memory Loss []Loss of Balance []Slurred Speech []Weakness []Seizures [] Dizziness [x] Denies all unless marked    The MA has completed the ROS with the patient. I have reviewed it in its' entirety with the patient and agree  "with the documentation.    PHYSICAL EXAMINATION:  Vitals: /69  Pulse 68  Ht 1.702 m (5' 7\")  Wt 114.3 kg (252 lb)  SpO2 98%  BMI 39.47 kg/m²  Constitutional - No acute distress  HEENT- Conjunctiva normal. No scars, masses, or lesions over external nose or ears, no neck masses noted, no jugular vein distension, no bruit  Cardiac- Regular rate and rhythm  Pulmonary- Clear to auscultation, good expansion, normal effort without use of accessory muscles  Musculoskeletal - No significant wasting of muscles noted, no bony deformities  Extremities - No clubbing, cyanosis or edema  Skin - Warm, dry, and intact. No rash, erythema, or pallor  Psychiatric - Mood, affect, and behavior appear normal    NEUROLOGIC EXAMINATION:    Mental status  [x]Awake, alert, oriented  [x]Affect attention and concentration appear appropriate  [x]Recent and remote memory appears unremarkable  [x]Speech normal without dysarthria or aphasia, comprehension and repetition intact.  COMMENTS:  Cranial Nerves [x]No VF deficit to confrontation  []PERRLA, EOMI, no nystagmus, conjugate eye movements, no ptosis  [x]Face symmetric  [x]Facial sensation intact  [x]Tongue midline no atrophy or fasciculations present  [x]Palate midline, hearing to finger rub normal bilaterally  [x]Shoulder shrug and SCM testing normal bilaterally  COMMENTS:  Motor [x]5/5 strength x 4 extremities  [x]Normal bulk and tone  [x]No tremor present  [x]No rigidity or bradykinesia noted  COMMENTS:  Sensory [x]Sensation intact to light touch, pin prick, vibration, and proprioception BLE  [x]Sensation intact to light touch, pin prick, vibration, and proprioception BUE  COMMENTS:  Coordination [x]FTN normal bilaterally  []HTS normal bilaterally  [x]WILIAN normal bilaterally.  COMMENTS:  Reflexes [x]Symmetric and non-pathological  []Toes down going bilaterally  [x]No clonus present  COMMENTS:  Gait [x]Normal steady " gait  []Ataxic  []Spastic  []Magnetic  []Shuffling  COMMENTS:    ADDITIONAL REVIEW:    Ophthalmology notes reviewed, right eye with 2+ disc edema, left eye with 3+ disc edema. Questioned possible diabetic papillitis, optic neuritis, or increased ICP. MRI brain completed 2025 with no acute pathology, minimal ,. MRI orbit with no mass, inflammatory changes, optic nerves unremarkable.    Reviewed referral records.    Assessment & Plan  Lucila JESSICA Kim is a 54 y.o. female here today for evaluation and treatment of headaches and papilledema. Exam nonfocal.    1. Papilledema.  Her symptoms are indicative of papilledema, characterized by optic nerve swelling. However, her brain MRI results were unremarkable. The possibility of idiopathic intracranial hypertension (IIH) can not be ruled out at this stage. A comprehensive panel of blood tests will be ordered to investigate potential underlying factors. A lumbar puncture will be scheduled to measure opening pressure and conduct fluid studies to check other possible sources of symptoms. An MRV will also be ordered to examine the veins in her head as venous insufficiencies could be source of increased pressure. She has been advised to communicate any significant worsening of her symptoms via MyChart.    2. Headaches.  She reports daily headaches that are frontal and extend to the sides and back of her head, with associated pain in the back of her neck. The headaches are not alleviated by any specific actions and are accompanied by dizziness when her head is in a dependent position. There is no history of headaches or head injuries. The possibility of starting Diamox will be considered if the lumbar puncture reveals elevated opening pressure. Medications will be prescribed pending studies. She also notes changes to her cognitive functioning as well.    3. Type II Diabetes Mellitus.  Her last A1c was 9.4 last month, indicating poor control. She has not been checking her  blood sugar levels regularly due to the effects of steroids. She is advised to resume regular monitoring of her blood sugar levels and adjust her insulin dosage accordingly.    ICD-10-CM  1. Memory loss R41.3 EDENILSON Screen with Reflex  B. Burgdorferi Antibodies by WB  CBC with Auto Differential  Comprehensive Metabolic Panel  C-Reactive Protein  Heavy metals screen  HIV Screen  Methylmalonic Acid, Serum  Vitamin D 25 Hydroxy  Vitamin B12  Vitamin B1, Whole Blood  Sedimentation Rate  RPR  Aquaporin-4 Receptor Antibody  Angiotensin Converting Enzyme, CSF  Arborvirus Panel Ab, CSF  CBC  Cryptococcal Antigen, CSF  Cell Count with Differential, CSF  CSF IgG  CSF IgG index  Culture, CSF (with Gram Stain)  Cytology, Non-Gyn  Flow cytometry leukemia/lymphoma nodes or fluids  Glucose, CSF  Gram Stain  Herpes Simplex 1 & 2, Molecular, With Reflex  HSV PCR  FL LUMBAR PUNCTURE DIAG  Lyme Disease AB, CSF  Meningitis Encephalitis Panel CSF, Molecular  Myelin Basic Protein, CSF  Oligoclonal Banding  Protein, CSF  Protein Electrophoresis, CSF  Varicella Zoster by PCR  VDRL, CSF  MRV HEAD WO CONTRAST    2. Papilledema H47.10 EDENILSON Screen with Reflex  B. Burgdorferi Antibodies by WB  CBC with Auto Differential  Comprehensive Metabolic Panel  C-Reactive Protein  Heavy metals screen  HIV Screen  Methylmalonic Acid, Serum  Vitamin D 25 Hydroxy  Vitamin B12  Vitamin B1, Whole Blood  Sedimentation Rate  RPR  Aquaporin-4 Receptor Antibody  Angiotensin Converting Enzyme, CSF  Arborvirus Panel Ab, CSF  CBC  Cryptococcal Antigen, CSF  Cell Count with Differential, CSF  CSF IgG  CSF IgG index  Culture, CSF (with Gram Stain)  Cytology, Non-Gyn  Flow cytometry leukemia/lymphoma nodes or fluids  Glucose, CSF  Gram Stain  Herpes Simplex 1 & 2, Molecular, With Reflex  HSV PCR  FL LUMBAR PUNCTURE DIAG  Lyme Disease AB, CSF  Meningitis Encephalitis Panel CSF, Molecular  Myelin Basic Protein, CSF  Oligoclonal Banding  Protein, CSF  Protein Electrophoresis,  CSF  Varicella Zoster by PCR  VDRL, CSF  MRV HEAD WO CONTRAST    3. Vision changes H53.9 EDENILSON Screen with Reflex  B. Burgdorferi Antibodies by WB  CBC with Auto Differential  Comprehensive Metabolic Panel  C-Reactive Protein  Heavy metals screen  HIV Screen  Methylmalonic Acid, Serum  Vitamin D 25 Hydroxy  Vitamin B12  Vitamin B1, Whole Blood  Sedimentation Rate  RPR  Aquaporin-4 Receptor Antibody  Angiotensin Converting Enzyme, CSF  Arborvirus Panel Ab, CSF  CBC  Cryptococcal Antigen, CSF  Cell Count with Differential, CSF  CSF IgG  CSF IgG index  Culture, CSF (with Gram Stain)  Cytology, Non-Gyn  Flow cytometry leukemia/lymphoma nodes or fluids  Glucose, CSF  Gram Stain  Herpes Simplex 1 & 2, Molecular, With Reflex  HSV PCR  FL LUMBAR PUNCTURE DIAG  Lyme Disease AB, CSF  Meningitis Encephalitis Panel CSF, Molecular  Myelin Basic Protein, CSF  Oligoclonal Banding  Protein, CSF  Protein Electrophoresis, CSF  Varicella Zoster by PCR  VDRL, CSF  MRV HEAD WO CONTRAST    4. Vitamin D deficiency E55.9 EDENILSON Screen with Reflex  B. Burgdorferi Antibodies by WB  CBC with Auto Differential  Comprehensive Metabolic Panel  C-Reactive Protein  Heavy metals screen  HIV Screen  Methylmalonic Acid, Serum  Vitamin D 25 Hydroxy  Vitamin B12  Vitamin B1, Whole Blood  Sedimentation Rate  RPR  Aquaporin-4 Receptor Antibody  Angiotensin Converting Enzyme, CSF  Arborvirus Panel Ab, CSF  CBC  Cryptococcal Antigen, CSF  Cell Count with Differential, CSF  CSF IgG  CSF IgG index  Culture, CSF (with Gram Stain)  Cytology, Non-Gyn  Flow cytometry leukemia/lymphoma nodes or fluids  Glucose, CSF  Gram Stain  Herpes Simplex 1 & 2, Molecular, With Reflex  HSV PCR  FL LUMBAR PUNCTURE DIAG  Lyme Disease AB, CSF  Meningitis Encephalitis Panel CSF, Molecular  Myelin Basic Protein, CSF  Oligoclonal Banding  Protein, CSF  Protein Electrophoresis, CSF  Varicella Zoster by PCR  VDRL, CSF  MRV HEAD WO CONTRAST    5. Other fatigue R53.83 EDENILSON Screen with  Reflex  B. Burgdorferi Antibodies by WB  CBC with Auto Differential  Comprehensive Metabolic Panel  C-Reactive Protein  Heavy metals screen  HIV Screen  Methylmalonic Acid, Serum  Vitamin D 25 Hydroxy  Vitamin B12  Vitamin B1, Whole Blood  Sedimentation Rate  RPR  Aquaporin-4 Receptor Antibody  Angiotensin Converting Enzyme, CSF  Arborvirus Panel Ab, CSF  CBC  Cryptococcal Antigen, CSF  Cell Count with Differential, CSF  CSF IgG  CSF IgG index  Culture, CSF (with Gram Stain)  Cytology, Non-Gyn  Flow cytometry leukemia/lymphoma nodes or fluids  Glucose, CSF  Gram Stain  Herpes Simplex 1 & 2, Molecular, With Reflex  HSV PCR  FL LUMBAR PUNCTURE DIAG  Lyme Disease AB, CSF  Meningitis Encephalitis Panel CSF, Molecular  Myelin Basic Protein, CSF  Oligoclonal Banding  Protein, CSF  Protein Electrophoresis, CSF  Varicella Zoster by PCR  VDRL, CSF  MRV HEAD WO CONTRAST    6. Altered mental status, unspecified altered mental status type R41.82 Meningitis Encephalitis Panel CSF, Molecular      PLAN:  Labs today as listed  2. LP with CSF studies  3. MRV head  4. Treatment pending care plan  5. For worsening of headaches or vision loss go to ER and alert office.  6. Continue follow-up with neuro-ophthalmologist as scheduled.  7. Return in about 4 weeks (around 6/12/2025) for Follow up, sooner with worsening symptoms.    IVETTE Duque CNP    I spent 34 minutes caring for Lucila Kim on this date of service. This time includes time spent by me in the following activities: preparing for the visit, reviewing tests, performing a medically appropriate examination and/or evaluation , counseling and educating the patient/family/caregiver, ordering medications, tests, or procedures, documenting information in the medical record and care coordination.    This dictation was generated by voice recognition computer software. Although all attempts are made to edit the dictation for accuracy, there may be errors in the  transcription that are not intended.      Electronically signed by Keyona Burton APRN - CNP at 05/16/2025 1:16 PM EDT   Back to top of Progress Notes  Natalia Solis - 05/15/2025 11:00 AM CDT  Formatting of this note might be different from the original.  REVIEW OF SYSTEMS    Constitutional: []Fever []Sweats []Chills [] Recent Injury [x] Denies all unless marked  HEENT:[]Headache [] Head Injury [] Hearing Loss [] Sore Throat [] Ear Ache [x] Denies all unless marked  Spine: [] Neck pain [] Back pain [] Sciaticia [x] Denies all unless marked  Cardiovascular:[]Heart Disease []Palpitations [] Chest Pain [x] Denies all unless marked  Pulmonary: []Shortness of Breath []Cough [x] Denies all unless marked  Psychiatric/Behavioral:[] Depression [] Anxiety [x] Denies all unless marked  Gastrointestinal: []Nausea []Vomiting []Abdominal Pain []Constipation []Diarrhea [x] Denies all unless marked  Genitourinary: [] Frequency [] Urgency [] Dysuria [] Incontinence [x] Denies all unless marked  Extremities: []Pain []Swelling [x] Denies all unless marked  Musculoskeletal: [] Myalgias [] Joint Pain [] Arthritis [] Muscle Cramps [] Muscle Twitches [x] Denies all unless marked  Sleep: []Insomnia[]Snoring []Restless Legs []Sleep Apnea []Daytime Sleepiness [x] Denies all unless marked  Skin:[] Rash [] Color Change [x] Denies all unless marked  Neurological:[]Visual Disturbance [] Memory Loss []Loss of Balance []Slurred Speech []Weakness []Seizures [] Dizziness [x] Denies all unless marked      Electronically signed by Natalia Solis at 05/16/2025 1:16 PM EDT   Back to top of Progress Notes  Plan of Treatment  - documented as of this encounter   Plan of Treatment - Upcoming Encounters  Upcoming Encounters  Date Type Department Care Team (Latest Contact Info) Description   08/08/2025 10:30 AM CDT Office Visit [facility]   [address] [place] [address]   [phone]  Keyona Burton APRN -  CNP   [address] [facility] [address]   [place] [address]   [phone] (Work)   [phone] (Fax)  Return in about 4 weeks (around 6/12/2025) for Follow up, sooner with worsening symptoms 30 min FU   10/13/2025 8:45 AM CDT Office Visit [facility]   [address] [facility] [address]   [place], [place] [id number] [phone]  Keyona Burton APRN - CNP   [address] [facility] [address]   [place] [address]   [phone] (Work)   [phone] (Fax)  Return in about 2 months (around 7/29/2025).     Plan of Treatment - Scheduled Orders  Scheduled Orders  Name Type Priority Associated Diagnoses Order Schedule   HIV Screen Lab Routine Memory loss   Papilledema   Vision changes   Vitamin D deficiency   Other fatigue  Expected: 05/15/2025, Expires: 05/15/2026   CSF IgG Lab Routine Memory loss   Papilledema   Vision changes   Vitamin D deficiency   Other fatigue  Expected: 05/15/2025, Expires: 11/15/2025   Gram Stain Microbiology Routine Memory loss   Papilledema   Vision changes   Vitamin D deficiency   Other fatigue  Expected: 05/15/2025, Expires: 05/15/2026     Lab Results  - documented in this encounter   Table of Contents for Lab Results   VDRL, CSF (05/22/2025 2:35 PM CDT)   (ABNORMAL) Protein Electrophoresis, CSF (05/22/2025 2:35 PM CDT)   (ABNORMAL) Protein, CSF (05/22/2025 2:35 PM CDT)   (ABNORMAL) Oligoclonal Banding (05/22/2025 2:35 PM CDT)   Myelin Basic Protein, CSF (05/22/2025 2:35 PM CDT)   Meningitis Encephalitis Panel CSF, Molecular (05/22/2025 2:35 PM CDT)   Lyme Disease AB, CSF (05/22/2025 2:35 PM CDT)   (ABNORMAL) Glucose, CSF (05/22/2025 2:35 PM CDT)   Culture, CSF (with Gram Stain) (05/22/2025 2:35 PM CDT)   (ABNORMAL) Cell Count with Differential, CSF (05/22/2025 2:35 PM CDT)   Arborvirus Panel Ab, CSF (05/22/2025 2:35 PM CDT)   (ABNORMAL) CBC (05/22/2025 1:36 PM CDT)   Flow cytometry leukemia/lymphoma nodes or fluids (05/22/2025 1:24 PM CDT)   Angiotensin Converting Enzyme, CSF (05/22/2025  1:24 PM CDT)   Aquaporin-4 Receptor Antibody (05/15/2025 12:02 PM CDT)   RPR (05/15/2025 12:02 PM CDT)   Sedimentation Rate (05/15/2025 12:02 PM CDT)   (ABNORMAL) Vitamin B1, Whole Blood (05/15/2025 12:02 PM CDT)   Vitamin B12 (05/15/2025 12:02 PM CDT)   Vitamin D 25 Hydroxy (05/15/2025 12:02 PM CDT)   Methylmalonic Acid, Serum (05/15/2025 12:02 PM CDT)   Heavy metals screen (05/15/2025 12:02 PM CDT)   (ABNORMAL) C-Reactive Protein (05/15/2025 12:02 PM CDT)   (ABNORMAL) Comprehensive Metabolic Panel (05/15/2025 12:02 PM CDT)   (ABNORMAL) CBC with Auto Differential (05/15/2025 12:02 PM CDT)   B. Burgdorferi Antibodies by WB (05/15/2025 12:02 PM CDT)   EDENILSON Screen with Reflex (05/15/2025 12:02 PM CDT)      VDRL, CSF (05/22/2025 2:35 PM CDT)  Lab Results - VDRL, CSF (05/22/2025 2:35 PM CDT)  Component Value Ref Range Test Method Analysis Time Performed At [facility]   VDRL, CSF Screen Non Reactive Non Reactive   05/25/2025 12:29 PM CDT [facility]     Comment:  Because the VDRL was Non Reactive, the VDRL titer was not performed.  Performed By: [facility]  [address] [place] [id number]  : Steve Daugherty MD, PhD  CLIA Number: [id number]       Lab Results - VDRL, CSF (05/22/2025 2:35 PM CDT)  Specimen (Source) Anatomical Location / Laterality Collection Method / Volume Collection Time Received Time   Cerebrospinal Fluid CEREBROSPINAL FLUID SPECIMEN / Unknown   05/22/2025 2:35 PM CDT 05/22/2025 3:12 PM CDT     Lab Results - VDRL, CSF (05/22/2025 2:35 PM CDT)  Narrative         Lab Results - VDRL, CSF (05/22/2025 2:35 PM CDT)  Authorizing Provider Result Type Result Status   Keyona Burton APRN - CNP MICROBIOLOGY - GENERAL ORDERABLES Final Result     Lab Results - VDRL, CSF (05/22/2025 2:35 PM CDT)  Performing Organization Address City/State/ZIP Code Phone Number   [facility]  [address] [id number], [facility]  [phone]    [facility]  [address]  [phone]       Back to top of Lab Results       (ABNORMAL) Protein Electrophoresis, CSF (05/22/2025 2:35 PM CDT)  Lab Results - (ABNORMAL) Protein Electrophoresis, CSF (05/22/2025 2:35 PM CDT)  Component Value Ref Range Test Method Analysis Time Performed At Pathologist Signature   Albumin, CSF 15.3 8.4 - 34.2 mg/dL   05/26/2025 6:20 PM CDT [facility]     Alpha-1,CSF 0.9 0.0 - 3.1 mg/dL   05/26/2025 6:20 PM CDT [facility]     Alpha-2,CSF 2.8 0.0 - 5.4 mg/dL   05/26/2025 6:20 PM CDT [facility]     Beta Globulin (CSF) 10.5 (H) 0.0 - 8.1 mg/dL   05/26/2025 6:20 PM CDT [facility]     Comment:  CSF contaminated with blood. Blood contamination may falsely elevate  the  total protein concentration and the Beta fraction of the  electrophoresis  pattern. Interpret results with caution.     Gamma Globulin (CSF) 2.0 0.0 - 5.4 mg/dL   05/26/2025 6:20 PM CDT [facility]     Comment:  Performed By: [facility]  [address] [place] [address]  : Steve Daugherty MD, PhD  CLIA Number: [id number]     Prealbumin Csf 1.0 0.0 - 3.1 mg/dL   05/26/2025 6:20 PM CDT [facility]     Protein, CSF 32.5 15.0 - 45.0 mg/dL   05/26/2025 6:20 PM CDT [facility]       Lab Results - (ABNORMAL) Protein Electrophoresis, CSF (05/22/2025 2:35 PM CDT)  Specimen (Source) Anatomical Location / Laterality Collection Method / Volume Collection Time Received Time     CEREBROSPINAL FLUID SPECIMEN / Unknown   05/22/2025 2:35 PM CDT 05/22/2025 3:12 PM CDT     Lab Results - (ABNORMAL) Protein Electrophoresis, CSF (05/22/2025 2:35 PM CDT)  Narrative         Lab Results - (ABNORMAL) Protein Electrophoresis, CSF (05/22/2025 2:35 PM CDT)  Authorizing Provider Result Type Result Status   Keyona STEELE - CNP BODY FLUIDS AND STOOLS ORDERABLES Final Result     Lab Results - (ABNORMAL) Protein Electrophoresis, CSF (05/22/2025 2:35 PM  CDT)  Performing Organization Address City/[facility]/ZIP Code Phone Number   [facility]  [address] [place] [address], [facility]  [phone]    [facility]  [address]  [phone]       Back to top of Lab Results       (ABNORMAL) Protein, CSF (05/22/2025 2:35 PM CDT)  Lab Results - (ABNORMAL) Protein, CSF (05/22/2025 2:35 PM CDT)  Component Value Ref Range Test Method Analysis Time Performed At [facility]   Protein, CSF 53 (H) 15 - 45 mg/dL   05/22/2025 2:51 PM CDT [facility]       Lab Results - (ABNORMAL) Protein, CSF (05/22/2025 2:35 PM CDT)  Specimen (Source) Anatomical Location / Laterality Collection Method / Volume Collection Time Received Time     CEREBROSPINAL FLUID SPECIMEN / Unknown   05/22/2025 2:35 PM CDT 05/22/2025 3:12 PM CDT     Lab Results - (ABNORMAL) Protein, CSF (05/22/2025 2:35 PM CDT)  Narrative         Lab Results - (ABNORMAL) Protein, CSF (05/22/2025 2:35 PM CDT)  Authorizing Provider Result Type Result Status   Keyona STEELE - CNP BODY FLUIDS AND STOOLS ORDERABLES Final Result     Lab Results - (ABNORMAL) Protein, CSF (05/22/2025 2:35 PM CDT)  Performing Organization Address City/[facility]/ZIP Code Phone Number   [facility]  [address] [place] [address], [facility]  [phone]       Back to top of Lab Results       (ABNORMAL) Oligoclonal Banding (05/22/2025 2:35 PM CDT)  Lab Results - (ABNORMAL) Oligoclonal Banding (05/22/2025 2:35 PM CDT)  Component Value Ref Range Test Method Analysis Time Performed At [facility]   Albumin, CSF 26 0 - 35 mg/dL   05/25/2025 12:50 PM CDT [facility]     IgG, CSF 3.6 0.0 - 6.0 mg/dL   05/25/2025 12:50 PM CDT [facility]     ALBUMIN, SERUM BY NEPHELOMETRY 3281 (L) 3500 - 5200 mg/dL   05/25/2025 12:50 PM CDT [facility]     IgG 647 (L) 768 - 1632 mg/dL   05/25/2025 12:50 PM CDT [facility]     IgG Index 0.70 (H) 0.28 -  0.66 ratio   05/25/2025 12:50 PM CDT [facility]     IgG Synthesis Rate, CSF 4.3 <=8.0 mg/d   05/25/2025 12:50 PM CDT [facility]     IgG/Albumin CSF 0.14 0.09 - 0.25 ratio   05/25/2025 12:50 PM CDT [facility]     Albumin Index 7.9 0.0 - 9.0 ratio   05/25/2025 12:50 PM CDT [facility]     Oligoclonal Bands, CSF Negative Negative   05/25/2025 12:50 PM CDT [facility]     Multiple Sclerosis Panel See Note     05/25/2025 12:50 PM CDT [facility]     Comment:  Isoelectric focusing/immunofixation revealed matching or identical  oligoclonal bands in the CSF and the serum. This result is consistent  with a  systemic, not an intrathecal immune reaction, and is considered to be a  negative result for oligoclonal bands. Approximately 5 percent of  patients  with clinically definitive multiple sclerosis will have a negative  result.  Increased concentrations of IgG in the CSF is an important indicator  for MS  but may also be associated with increased permeability of the blood-CSF  barrier, or increased local production of IgG, or both. Increased IgG  production is demonstrated by an increased CSF IgG/Albumin ratio, IgG  Index  and IgG synthesis rate.  INTERPRETIVE INFORMATION: Oligoclonal Band Profile  To ensure accurate result interpretation, it is recommended that both  CSF and  serum specimens be collected on the same day. If specimens are not  collected  within this specified timeframe, it is advised to exercise caution when  interpreting the results.  Performed By: [facility]  [address] [place] [id number]  : Steve Daugherty MD, PhD  CLIA Number: [id number]   Oligoclonal Bands Number Matching 0 - 1 Bands   05/25/2025 12:50 PM CDT [facility]       Lab Results - (ABNORMAL) Oligoclonal Banding (05/22/2025 2:35 PM CDT)  Specimen (Source) Anatomical Location / Laterality Collection Method / Volume Collection Time Received Time   Blood and CSF      05/22/2025 2:35 PM CDT 05/22/2025 3:12 PM CDT     Lab Results - (ABNORMAL) Oligoclonal Banding (05/22/2025 2:35 PM CDT)  Narrative         Lab Results - (ABNORMAL) Oligoclonal Banding (05/22/2025 2:35 PM CDT)  Authorizing Provider Result Type Result Status   Keyona STEELE - CNP BODY FLUIDS AND STOOLS ORDERABLES Final Result     Lab Results - (ABNORMAL) Oligoclonal Banding (05/22/2025 2:35 PM CDT)  Performing Organization Address City/State/ZIP Code Phone Number   [facility]  [address] [place] [address], [facility]  [phone]    [facility]  [address] [place] [address]  [phone]       Back to top of Lab Results       Myelin Basic Protein, CSF (05/22/2025 2:35 PM CDT)  Lab Results - Myelin Basic Protein, CSF (05/22/2025 2:35 PM CDT)  Component Value Ref Range Test Method Analysis Time Performed At [facility]   Myelin Basic Protein, CSF 3.82 0.00 - 5.50 ng/mL   05/25/2025 4:24 PM CDT [facility]     Comment:  INTERPRETIVE INFORMATION:  Myelin Basic Protein  This test was developed and its performance characteristics determined  by  [facility]. It has not been cleared or approved by the US Food  and  Drug Administration. This test was performed in a CLIA certified  laboratory  and is intended for clinical purposes.  Performed By: [facility]  [address] [place] [address]  : Steve Daugherty MD, PhD  CLIA Number: [id number]       Lab Results - Myelin Basic Protein, CSF (05/22/2025 2:35 PM CDT)  Specimen (Source) Anatomical Location / Laterality Collection Method / Volume Collection Time Received Time     CEREBROSPINAL FLUID SPECIMEN / Unknown   05/22/2025 2:35 PM CDT 05/22/2025 3:12 PM CDT     Lab Results - Myelin Basic Protein, CSF (05/22/2025 2:35 PM CDT)  Narrative         Lab Results - Myelin Basic Protein, CSF (05/22/2025 2:35 PM CDT)  Authorizing Provider Result Type Result Status   Keyona STEELE  - CNP BODY FLUIDS AND STOOLS ORDERABLES Final Result     Lab Results - Myelin Basic Protein, CSF (05/22/2025 2:35 PM CDT)  Performing Organization Address City/State/ZIP Code Phone Number   [facility]  [address] [place] [address], [facility]  [phone]    [facility]  [address] [place], [place] [address] [place] [address]  [phone]       Back to top of Lab Results       Meningitis Encephalitis Panel CSF, Molecular (05/22/2025 2:35 PM CDT)  Lab Results - Meningitis Encephalitis Panel CSF, Molecular (05/22/2025 2:35 PM CDT)  Component Value Ref Range Test Method Analysis Time Performed At [facility]   Cryptococcus neoformans/gattii CSF by PCR Not Detected Not Detected   05/22/2025 2:47 PM CDT [facility]     Cytomegalovirus CSF by PCR Not Detected Not Detected   05/22/2025 2:47 PM CDT [facility]     Enterovirus CSF by PCR Not Detected Not Detected   05/22/2025 2:47 PM CDT [facility]     Escherichia coli K1 CSF by PCR Not Detected Not Detected   05/22/2025 2:47 PM CDT [facility]     Haemophilus influenzae CSF by PCR Not Detected Not Detected   05/22/2025 2:47 PM CDT [facility]     Herpes simplex virus 1 CSF by PCR Not Detected Not Detected   05/22/2025 2:47 PM CDT [facility]     Herpes simplex virus 2 CSF by PCR Not Detected Not Detected   05/22/2025 2:47 PM CDT [facility]     Human herpesvirus 6 CSF by PCR Not Detected Not Detected   05/22/2025 2:47 PM CDT [facility]     Human parechovirus CSF by PCR Not Detected Not Detected   05/22/2025 2:47 PM CDT [facility]     Listeria monocytogenes CSF by PCR Not Detected Not Detected   05/22/2025 2:47 PM [facility]     Neisseria meningitidis CSF by PCR Not Detected Not Detected   05/22/2025 2:47 PM CDT [facility]OS[facility]     Streptococcus agalactiae CSF by PCR Not Detected Not Detected   05/22/2025 2:47 PM CDT [facility]     Streptococcus pneumoniae CSF by PCR Not Detected Not Detected   05/22/2025 2:47 PM CDT [facility]     Varicella zoster virus CSF by PCR Not Detected Not Detected   05/22/2025 2:47 PM CDT [facility]       Lab Results - Meningitis Encephalitis Panel CSF, Molecular (05/22/2025 2:35 PM CDT)  Specimen (Source) Anatomical Location / Laterality Collection Method / Volume Collection Time Received Time   CSF CEREBROSPINAL FLUID SPECIMEN / Unknown   05/22/2025 2:35 PM CDT 05/22/2025 3:09 PM CDT     Lab Results - Meningitis Encephalitis Panel CSF, Molecular (05/22/2025 2:35 PM CDT)  Narrative         Lab Results - Meningitis Encephalitis Panel CSF, Molecular (05/22/2025 2:35 PM CDT)  Authorizing Provider Result Type Result Status   Keyona Burton APRN - CNP MICROBIOLOGY - GENERAL ORDERABLES Final Result     Lab Results - Meningitis Encephalitis Panel CSF, Molecular (05/22/2025 2:35 PM CDT)  Performing Organization Address City/State/ZIP Code Phone Number   [facility]  [address] [place] [address], [facility]  [phone]       Back to top of Lab Results       Lyme Disease AB, CSF (05/22/2025 2:35 PM CDT)  Lab Results - Lyme Disease AB, CSF (05/22/2025 2:35 PM CDT)  Component Value Ref Range Test Method Analysis Time Performed At [facility]   B. burgdorferi VlsE1/pepC10 Abs, CSF 0.10 <=0.90 IV   05/24/2025 11:29 AM CDT [facility]     Comment:  The CSF specimen shows evidence of blood contamination and is,  therefore,  likely contaminated with serum antibodies. Antibody testing from this  specimen is not recommended as the blood may interfere, causing a  false-positive result that does not represent intrathecally produced  antibodies. False-negative results are also possible. It is highly  recommended  that serum testing for the same analyte also be performed  in  order to aid in interpreting the CSF test result.  When Borrelia burgdorferi VlsE1/pepC10 assay is negative further  testing is  not recommended and will not be performed.  REFERENCE INTERVAL: B. burgdorferi VlsE1/pepC10 Abs, CSF   0.90 IV or less ....... Negative - VlsE1 and pepC10                           antibodies to B. burgdorferi not                           detected.   0.91-1.09 IV .......... Equivocal - Repeat testing in                           10-14 days may be helpful.   1.10 IV or greater .... Positive - VlsE1 and pepC10                           antibodies to B. burgdorferi                           detected.  The detection of antibodies to Borrelia burgdorferi in cerebrospinal  fluid  may indicate central nervous system infection. However, consideration  must be  given to possible contamination by blood or transfer of serum  antibodies  across the blood-brain barrier. Lyme disease diagnosis in serum is  recommended prior to any CSF studies.  This test was developed and its performance characteristics determined  by  [facility]. It has not been cleared or approved by the US Food  and  Drug Administration. This test was performed in a CLIA certified  laboratory  and is intended for clinical purposes.  Performed By: [facility]  [address] [place] [id number]  : Steve Daugherty MD, PhD  CLIA Number: [id number]     Lab Results - Lyme Disease AB, CSF (05/22/2025 2:35 PM CDT)  Specimen (Source) Anatomical Location / Laterality Collection Method / Volume Collection Time Received Time   Cerebrospinal Fluid CEREBROSPINAL FLUID SPECIMEN / Unknown   05/22/2025 2:35 PM CDT 05/22/2025 3:12 PM CDT     Lab Results - Lyme Disease AB, CSF (05/22/2025 2:35 PM CDT)  Narrative         Lab Results - Lyme Disease AB, CSF (05/22/2025 2:35 PM CDT)  Authorizing Provider Result Type Result Status   Keyona Burton  APRN - CNP MICROBIOLOGY - GENERAL ORDERABLES Final Result     Lab Results - Lyme Disease AB, CSF (05/22/2025 2:35 PM CDT)  Performing Organization Address City/[facility]/ZIP Code Phone Number   [facility]  [address] [place] [id number], [facility]  [phone]    [facility]  [address] [place], [place] [address] [place] [address]  [phone]       Back to top of Lab Results       (ABNORMAL) Glucose, CSF (05/22/2025 2:35 PM CDT)  Lab Results - (ABNORMAL) Glucose, CSF (05/22/2025 2:35 PM CDT)  Component Value Ref Range Test Method Analysis Time Performed At [facility]   Glucose,  (H) 40 - 70 mg/dL   05/22/2025 2:51 PM CDT [facility]       Lab Results - (ABNORMAL) Glucose, CSF (05/22/2025 2:35 PM CDT)  Specimen (Source) Anatomical Location / Laterality Collection Method / Volume Collection Time Received Time     CEREBROSPINAL FLUID SPECIMEN / Unknown   05/22/2025 2:35 PM CDT 05/22/2025 3:12 PM CDT     Lab Results - (ABNORMAL) Glucose, CSF (05/22/2025 2:35 PM CDT)  Narrative         Lab Results - (ABNORMAL) Glucose, CSF (05/22/2025 2:35 PM CDT)  Authorizing Provider Result Type Result Status   Keyona Burton APRN - CNP BODY FLUIDS AND STOOLS ORDERABLES Final Result     Lab Results - (ABNORMAL) Glucose, CSF (05/22/2025 2:35 PM CDT)  Performing Organization Address City/[facility]/ZIP Code Phone Number   [facility]  [address] [place] [address], [facility]  [phone]       Back to top of Lab Results       Culture, CSF (with Gram Stain) (05/22/2025 2:35 PM CDT)  Lab Results - Culture, CSF (with Gram Stain) (05/22/2025 2:35 PM CDT)  Component Value Ref Range Test Method Analysis Time Performed At [facility]   CSF Culture No growth after 7 days of incubation.       [facility]     Gram Stain Result Few WBC's (Polymorphonuclear)  Moderate RBC's  No organisms seen       [facility]     Anaerobic Culture  No anaerobes isolated 7 days       [facility]       Lab Results - Culture, CSF (with Gram Stain) (05/22/2025 2:35 PM CDT)  Specimen (Source) Anatomical Location / Laterality Collection Method / Volume Collection Time Received Time   Cerebrospinal Fluid CEREBROSPINAL FLUID SPECIMEN / Unknown   05/22/2025 2:35 PM CDT 05/22/2025 3:13 PM CDT     Lab Results - Culture, CSF (with Gram Stain) (05/22/2025 2:35 PM CDT)  Narrative   [facility] - 05/29/2025 5:03 PM CDT   ORDER#: [id number]                          ORDERED BY: SHE RAJPUT  SOURCE: CSF (Spinal Fluid)                 COLLECTED:  05/22/25 14:35  ANTIBIOTICS AT GAUDENCIO.:                      RECEIVED :  05/22/25 15:13      Lab Results - Culture, CSF (with Gram Stain) (05/22/2025 2:35 PM CDT)  Authorizing Provider Result Type Result Status   She Rajput APRN - CNP MICROBIOLOGY - GENERAL ORDERABLES Edited Result - Final     Lab Results - Culture, CSF (with Gram Stain) (05/22/2025 2:35 PM CDT)  Performing Organization Address City/State/ZIP Code Phone Number   [facility]  [address]  [phone]       Back to top of Lab Results       (ABNORMAL) Cell Count with Differential, CSF (05/22/2025 2:35 PM CDT)  Lab Results - (ABNORMAL) Cell Count with Differential, CSF (05/22/2025 2:35 PM CDT)  Component Value Ref Range Test Method Analysis Time Performed At [facility]   Color, CSF Colorless Colorless   05/22/2025 4:28 PM CDT [facility]     Appearance, CSF Clear Clear   05/22/2025 4:28 PM CDT [facility]     Tube Number + CELL CT + DIFF-CSF Tube 4     05/22/2025 4:28 PM CDT [facility]     Clot Evaluation CSF see below     05/22/2025 4:28 PM CDT [facility]     Comment: No Clots Seen   WBC, CSF 2 0 - 8 /cumm   05/22/2025 4:28 PM CDT [facility]     RBC,   (H) 0 - 5 /cumm   05/22/2025 4:28 PM CDT [facility]     No differential CSF see below     05/22/2025 4:28 PM CDT [facility]     Comment: Differential not performed - Total Nucleated cells </= 10     Lab Results - (ABNORMAL) Cell Count with Differential, CSF (05/22/2025 2:35 PM CDT)  Specimen (Source) Anatomical Location / Laterality Collection Method / Volume Collection Time Received Time   CSF CEREBROSPINAL FLUID SPECIMEN / Unknown   05/22/2025 2:35 PM CDT 05/22/2025 3:10 PM CDT     Lab Results - (ABNORMAL) Cell Count with Differential, CSF (05/22/2025 2:35 PM CDT)  Narrative         Lab Results - (ABNORMAL) Cell Count with Differential, CSF (05/22/2025 2:35 PM CDT)  Authorizing Provider Result Type Result Status   Keyona Burton APRN - CNP BODY FLUIDS AND STOOLS ORDERABLES Final Result     Lab Results - (ABNORMAL) Cell Count with Differential, CSF (05/22/2025 2:35 PM CDT)  Performing Organization Address City/State/ZIP Code Phone Number   [facility]  [address] [place] [address], [facility]  [phone]       Back to top of Lab Results       Arborvirus Panel Ab, CSF (05/22/2025 2:35 PM CDT)  Lab Results - Arborvirus Panel Ab, CSF (05/22/2025 2:35 PM CDT)  Component Value Ref Range Test Method Analysis Time Performed At [facility]   WEST NILE AB IGG CSF 0.06 <=1.29 IV   05/28/2025 2:27 AM CDT [facility]     Comment:  INTERPRETIVE INFORMATION: West Nile Virus Ab IgG by SAMMIE, CSF   1.29 IV or less ....... Negative: No significant                           level of West Nile virus                           IgG antibody detected.   1.30 - 1.49 IV ........ Equivocal: Questionable                           presence of West Nile                           virus IgG antibody detected.                           Repeat testing in 10-14 days                           may be helpful.   1.50 IV or greater .... Positive: Presence of  IgG                           antibody to West Nile virus                           detected, suggestive of                           current or past infection.  This test is intended to be used as a semi-quantitative means of  detecting  West Nile virus-specific IgG in CSF samples in which there is a  clinical  suspicion of West Nile Virus infection. This test should not be used  solely  for quantitative purposes, nor should the results be used without  correlation  to clinical history or other data. Because other members of the  Flaviviridae  family, such as Lewis encephalitis virus, show extensive  cross-reactivity  with West Nile virus, serologic testing specific for these species  should be  considered.  The detection of antibodies to West Nile virus in cerebrospinal fluid  may  indicate central nervous system infection. However, consideration must  be  given to possible contamination by blood or transfer of serum  antibodies  across the blood-brain barrier.  This test was developed and its performance characteristics determined  by  [facility]. It has not been cleared or approved by the [place] Food  and  Drug Administration. This test was performed in a CLIA certified  laboratory  and is intended for clinical purposes.  Performed By: [facility]  [address] [place] [address]  : Steve Daugherty MD, PhD  CLIA Number: [id number]   [facility] IgG, CSF <1:1 <1:1   05/28/2025 2:27 AM CDT [facility]     Comment:  INTERPRETIVE DATA: [place] IgG, CSF    This test is intended to be used as a semi-quantitative means of  detecting  [place] Encephalitis Group virus-specific IgG in CSF samples in  which  there is a clinical suspicion of [place] Encephalitis Group virus  infection.  A positive result for IgG may suggest current or past  infection.  This test should not be used solely for quantitative purposes, nor  should the  results be used without  correlation to clinical history or other data.  [place] virus is related within the [place] Encephalitis Group and  generally is reactive with antibody to other viruses within this group.  This test was developed and its performance characteristics determined  by  [employer]. It has not been cleared or approved by the US Food  and  Drug Administration. This test was performed in a CLIA certified  laboratory  and is intended for clinical purposes.     Garvin Enceph IgG ,CSF <1:1 <1:1   05/28/2025 2:27 AM CDT [facility]     Comment:  INTERPRETIVE DATA: Garvin IgG, CSF  This test is intended to be used as a semi-quantitative means of  detecting  Garvin virus-specific IgG in CSF samples in which there is a  clinical  suspicion of Garvin virus infection.  A positive result for IgG may  suggest current or past infection. This test should not be used solely  for  quantitative purposes, nor should the results be used without  correlation to  clinical history or other data.  Because other members of the  Flaviviridae  family, such as West Nile virus, show extensive cross-reactivity with  St.  Mikael virus, serologic testing specific for these species should also  be  performed.  This test was developed and its performance characteristics determined  by  [facility]. It has not been cleared or approved by the US Food  and  Drug Administration. This test was performed in a CLIA certified  laboratory  and is intended for clinical purposes.     Eastern Eq Enceph IgG, CSF <1:1 <1:1   05/28/2025 2:27 AM CDT [facility]     Comment:  INTERPRETIVE INFORMATION: Eastern Equine Enceph Ab, IgG, CSF  This test is intended to be used as a semiquantitative means of  detecting  eastern equine virus-specific IgG in CSF samples when there is a  clinical  suspicion of eastern equine encephalitis virus infection. A negative  result  for IgG does not rule out eastern equine encephalitis virus  infection  and  additional testing is recommended if warranted by clinical history and  symptoms. A positive result for IgG may  suggest current or past infection. This test should not be used solely  for  quantitative purposes, nor should the results be used without  correlation to  clinical history or other data. Because other members of the Alphavirus  family, such as western equine encephalitis virus, show extensive  cross-reactivity with eastern equine encephalitis virus, serologic  testing  specific for these species should also be performed.  This test was developed and its performance characteristics determined  by  [facility]. It has not been cleared or approved by the US Food  and  Drug Administration. This test was performed in a CLIA certified  laboratory  and is intended for clinical purposes.   West Eq Enceph IgG, CSF <1:1 <1:1   05/28/2025 2:27 AM CDT [facility]     Comment:  INTERPRETIVE DATA: Western IgG, CSF  This test is intended to be used as a semi-quantitative means of  detecting  western equine virus-specific IgG in CSF samples in which there is a  clinical  suspicion of western equine virus infection. A positive result for IgG  may  suggest current or past infection.This test should not be used solely  for  quantitative purposes, nor should the results be used without  correlation to  clinical history or other data.  Because other members of the  Alphavirus  family, such as eastern equine encephalitis virus, show extensive  cross-reactivity with western equine encephalitis virus, serologic  testing  specific for these species should also be performed.  This test was developed and its performance characteristics determined  by  [facility]. It has not been cleared or approved by the US Food  and  Drug Administration. This test was performed in a CLIA certified  laboratory  and is intended for clinical purposes.       Lab Results - Arborvirus Panel Ab, CSF (05/22/2025 2:35 PM  CDT)  Specimen (Source) Anatomical Location / Laterality Collection Method / Volume Collection Time Received Time     CEREBROSPINAL FLUID SPECIMEN / Unknown   05/22/2025 2:35 PM CDT 05/22/2025 3:12 PM CDT     Lab Results - Arborvirus Panel Ab, CSF (05/22/2025 2:35 PM CDT)  Narrative         Lab Results - Arborvirus Panel Ab, CSF (05/22/2025 2:35 PM CDT)  Authorizing Provider Result Type Result Status   Keyona STEELE - CNP BODY FLUIDS AND STOOLS ORDERABLES Final Result     Lab Results - Arborvirus Panel Ab, CSF (05/22/2025 2:35 PM CDT)  Performing Organization Address City/State/ZIP Code Phone Number   [facility]  [address]  [phone]    [facility]  [address] [place], [place] [address] [place] [address]  [phone]       Back to top of Lab Results       (ABNORMAL) CBC (05/22/2025 1:36 PM CDT)  Lab Results - (ABNORMAL) CBC (05/22/2025 1:36 PM CDT)  Component Value Ref Range Test Method Analysis Time Performed At Pathologist Signature   WBC 12.1 (H) 4.8 - 10.8 K/uL   05/22/2025 1:44 PM CDT [facility]     RBC 4.71 4.20 - 5.40 M/uL   05/22/2025 1:44 PM CDT [facility]     Hemoglobin 12.3 12.0 - 16.0 g/dL   05/22/2025 1:44 PM CDT [facility]     Hematocrit 39.3 37.0 - 47.0 %   05/22/2025 1:44 PM CDT [facility]     MCV 83.4 81.0 - 99.0 fL   05/22/2025 1:44 PM CDT [facility]     MCH 26.1 (L) 27.0 - 31.0 pg   05/22/2025 1:44 PM CDT [facility]     MCHC 31.3 (L) 33.0 - 37.0 g/dL   05/22/2025 1:44 PM CDT [facility]     RDW 16.3 (H) 11.5 - 14.5 %   05/22/2025 1:44 PM [facility]     Platelets 167 130 - 400 K/uL   05/22/2025 1:44 PM [facility]     MPV 12.6 (H) 9.4 - 12.3 fL   05/22/2025 1:44 PM [facility]       Lab Results - (ABNORMAL) CBC  (05/22/2025 1:36 PM CDT)  Specimen (Source) Anatomical Location / Laterality Collection Method / Volume Collection Time Received Time   Blood BLOOD SPECIMEN / Unknown   05/22/2025 1:36 PM CDT 05/22/2025 1:36 PM CDT     Lab Results - (ABNORMAL) CBC (05/22/2025 1:36 PM CDT)  Narrative         Lab Results - (ABNORMAL) CBC (05/22/2025 1:36 PM CDT)  Authorizing Provider Result Type Result Status   Keyona Burton APRN - CNP HEMATOLOGY ORDERABLES Final Result     Lab Results - (ABNORMAL) CBC (05/22/2025 1:36 PM CDT)  Performing Organization Address City/State/ZIP Code Phone Number   [facility]  [id number], [facility]  [phone]       Back to top of Lab Results       Flow cytometry leukemia/lymphoma nodes or fluids (05/22/2025 1:24 PM CDT)  Lab Results - Flow cytometry leukemia/lymphoma nodes or fluids (05/22/2025 1:24 PM CDT)  Component Value Ref Range Test Method Analysis Time Performed At Pathologist Signature   Source Blood     05/25/2025 9:31 AM CDT [facility]     Comment:  Corrected result; previously reported as whl bld on 05/22/2025 at 13:35 by  V/AUT     Number of markers 26 markers   05/25/2025 9:31 AM CDT [facility]     Interpretation See Note     05/25/2025 9:31 AM CDT [facility]     Comment:      SAMPLE: Peripheral Blood    IMPRESSION:  1. Low viability specimen with relative increase of granulocytes  without  immunophenotypic aberrancy.  No abnormal B cell, T cell, or NK cell  population identified. See comment 1.    COMMENT:  1. There is a decreased of CD33 expression among myeloid lineage cells  of  unclear significance. Clinical and morphologic correlation will be  required  to make a definitive diagnosis.    POPULATION PHENOTYPE:  1. Positive: CD10, CD11b, CD13, CD16, low CD33, CD64  Negative: HLA-DR, CD34, CD56,     The reduced viability raises the possibility that some of the antigen  expression patterns may be adversely affected, and  should be correlated  with  other stains for a more definitive phenotype.    ANALYSIS  Differential:  Lymphocytes: 11%  Monocytes: 5.4%  Granulocytes: 82%    Blasts: 0.040%    Lymphocytes:  B-cells: 7.4%  T-cells: 84%  NK-cells: 8.6%    Kappa:Lambda Ratio: approx. 2:1  CD4:CD8 Ratio: approx. 2:1    Viability: 70%    Markers run: HLA-DR, Kappa, Lambda, CD2, CD3, CD4, CD5, CD7, CD8, CD10,  CD11b, CD13, CD14, CD16, CD19, CD20, CD23, CD33, CD34, CD38, CD45,  CD56,  CD57, CD64, ,   Num of Markers Run: 26    This result has been reviewed and approved by Lincoln Darby M.D.  05/25/2025  INTERPRETIVE INFORMATION: Leuk/Lymph Phenotyping, Flow Cytometry  This test was developed and its performance characteristics determined  by  [facility]. It has not been cleared or approved by the [place] Food  and  Drug Administration. This test was performed in a CLIA certified  laboratory  and is intended for clinical purposes.  Performed By: [facility]  [address] [place] [address]  : Steve Daugherty MD, PhD  CLIA Number: [id number]   Prof Leuk/Lymph Pheno 16 or more Markes Billed     05/25/2025 9:31 AM CDT [facility]     Comment:  Performed By: [facility]  [address] [place] [address]  : Steve Daugherty MD, PhD  CLIA Number: [id number]     Prof Leuk/Lymph Pheno 26 Markers Billed     05/25/2025 9:31 AM CDT [facility]     Comment:  Performed By: [facility]  [address] [place] [address]  : Steve Daugherty MD, PhD  CLIA Number: [id number]       Lab Results - Flow cytometry leukemia/lymphoma nodes or fluids (05/22/2025 1:24 PM CDT)  Specimen (Source) Anatomical Location / Laterality Collection Method / Volume Collection Time Received Time     CEREBROSPINAL FLUID SPECIMEN / Unknown   05/22/2025 1:24 PM CDT 05/22/2025 1:33 PM CDT     Lab Results - Flow cytometry leukemia/lymphoma nodes or fluids  (05/22/2025 1:24 PM CDT)  Narrative         Lab Results - Flow cytometry leukemia/lymphoma nodes or fluids (05/22/2025 1:24 PM CDT)  Authorizing Provider Result Type Result Status   Keyona STEELE - CNP PATHOLOGY/CYTOLOGY ORDERABLES Final Result     Lab Results - Flow cytometry leukemia/lymphoma nodes or fluids (05/22/2025 1:24 PM CDT)  Performing Organization Address City/State/ZIP Code Phone Number   [facility]  [address] [place] [address], [facility]  [phone]    [facility]  [address]  [phone]       Back to top of Lab Results       Angiotensin Converting Enzyme, CSF (05/22/2025 1:24 PM CDT)  Lab Results - Angiotensin Converting Enzyme, CSF (05/22/2025 1:24 PM CDT)  Component Value Ref Range Test Method Analysis Time Performed At Pathologist [facility]   Angio Convert Enzyme, CSF 1.0 0.0 - 2.5 U/L   05/26/2025 4:38 PM CDT [facility]     Comment:  This test was developed and its performance characteristics determined  by  [email or web address]. It has not been cleared or approved by the [place] Food  and  Drug Administration. This test was performed in a CLIA certified  laboratory  and is intended for clinical purposes.  Performed By: [email or web address]  [address] [place] [address]  : Steve Daugherty MD, PhD  CLIA Number: [id number]       Lab Results - Angiotensin Converting Enzyme, CSF (05/22/2025 1:24 PM CDT)  Specimen (Source) Anatomical Location / Laterality Collection Method / Volume Collection Time Received Time   CSF CEREBROSPINAL FLUID SPECIMEN / Unknown   05/22/2025 1:24 PM CDT 05/22/2025 1:33 PM CDT     Lab Results - Angiotensin Converting Enzyme, CSF (05/22/2025 1:24 PM CDT)  Narrative         Lab Results - Angiotensin Converting Enzyme, CSF (05/22/2025 1:24 PM CDT)  Authorizing Provider Result Type Result Status   Keyona STEELE [facility] BODY FLUIDS AND STOOLS ORDERABLES Final Result     Lab Results -  Angiotensin Converting Enzyme, CSF (05/22/2025 1:24 PM CDT)  Performing Organization Address City/State/ZIP Code Phone Number   [facility]  [address] [place] [address], [facility]  [phone]    [facility]  [address] [place] [address]  [phone]       Back to top of Lab Results       Aquaporin-4 Receptor Antibody (05/15/2025 12:02 PM CDT)  Lab Results - Aquaporin-4 Receptor Antibody (05/15/2025 12:02 PM CDT)  Component Value Ref Range Test Method Analysis Time Performed At Pathologist Signature   Aquaporin 4 Antibody <1:10 <1:10   05/19/2025 3:32 PM CDT [facility]     Comment:  Aquaporin-4 Receptor Antibody, IgG is not detected. No further testing  will  be performed.  INTERPRETIVE INFORMATION: NMO/AQP4 Ab IgG CBA-IFA Screen,                           Serum  Neuromyelitis optic (NMO) commonly presents with optic neuritis or  longitudinally extensive transverse myelitis. Approximately 75 percent  of  patients with NMO have antibodies to the aquaporin-4 (AQP4) receptor.  While  the absence of AQP4 receptor antibodies does not rule out a diagnosis  of NMO,  presence of this antibody is diagnostic for NMO.  This indirect fluorescent antibody assay utilizes AQP4 receptor  transfected  cell lines for the detection and semiquantification of AQP4 IgG  antibody.  This test was developed and its performance characteristics determined  by  [employer]. It has not been cleared or approved by the [place] Food  and  Drug Administration. This test was performed in a CLIA certified  laboratory  and is intended for clinical purposes.  Performed By: [facility]  [address] [place] [address]  : Steve Daugherty MD, PhD  CLIA Number: [id number]     Lab Results - Aquaporin-4 Receptor Antibody (05/15/2025 12:02 PM CDT)  Specimen (Source) Anatomical Location / Laterality Collection Method / Volume Collection Time Received Time   Blood BLOOD SPECIMEN /  Unknown   05/15/2025 12:02 PM CDT 05/15/2025 12:16 PM CDT     Lab Results - Aquaporin-4 Receptor Antibody (05/15/2025 12:02 PM CDT)  Narrative         Lab Results - Aquaporin-4 Receptor Antibody (05/15/2025 12:02 PM CDT)  Authorizing Provider Result Type Result Status   Keyona [facility] HEMATOLOGY ORDERABLES Final Result     Lab Results - Aquaporin-4 Receptor Antibody (05/15/2025 12:02 PM CDT)  Performing Organization Address City/State/ZIP Code Phone Number   [facility]  [address] [place] [address], [facility]  [phone]    [facility]  [address] [place], [place] [address] [place] [address]  [phone]       Back to top of Lab Results       RPR (05/15/2025 12:02 PM CDT)  Lab Results - RPR (05/15/2025 12:02 PM CDT)  Component Value Ref Range Test Method Analysis Time Performed At Pathologist Signature   RPR Non-reactive Non-reactive   05/19/2025 9:53 AM CDT [facility]       Lab Results - RPR (05/15/2025 12:02 PM CDT)  Specimen (Source) Anatomical Location / Laterality Collection Method / Volume Collection Time Received Time   Blood BLOOD SPECIMEN / Unknown   05/15/2025 12:02 PM CDT 05/15/2025 12:16 PM CDT     Lab Results - RPR (05/15/2025 12:02 PM CDT)  Narrative         Lab Results - RPR (05/15/2025 12:02 PM CDT)  Authorizing Provider Result Type Result Status   Keyona STEELE [facility] IMMUNOLOGY ORDERABLES Final Result     Lab Results - RPR (05/15/2025 12:02 PM CDT)  Performing Organization Address City/State/ZIP Code Phone Number   [facility]  [address] [place] [address], [facility]  [phone]       Back to top of Lab Results       Sedimentation Rate (05/15/2025 12:02 PM CDT)  Lab Results - Sedimentation Rate (05/15/2025 12:02 PM CDT)  Component Value Ref Range Test Method Analysis Time Performed At Pathologist Signature   Sed Rate, Automated 22 0 - 25 mm/Hr   05/15/2025 1:17 PM CDT [facility]       Lab Results -  Sedimentation Rate (05/15/2025 12:02 PM CDT)  Specimen (Source) Anatomical Location / Laterality Collection Method / Volume Collection Time Received Time   Blood BLOOD SPECIMEN / Unknown   05/15/2025 12:02 PM CDT 05/15/2025 12:16 PM CDT     Lab Results - Sedimentation Rate (05/15/2025 12:02 PM CDT)  Narrative         Lab Results - Sedimentation Rate (05/15/2025 12:02 PM CDT)  Authorizing Provider Result Type Result Status   Keyona Burton APRN - CNP HEMATOLOGY ORDERABLES Final Result     Lab Results - Sedimentation Rate (05/15/2025 12:02 PM CDT)  Performing Organization Address City/State/ZIP Code Phone Number   [facility]  [address] [id number], [facility]  [phone]       Back to top of Lab Results       (ABNORMAL) Vitamin B1, Whole Blood (05/15/2025 12:02 PM CDT)  Lab Results - (ABNORMAL) Vitamin B1, Whole Blood (05/15/2025 12:02 PM CDT)  Component Value Ref Range Test Method Analysis Time Performed At [facility]   Vitamin B1,Whole Blood 193 (H) 70 - 180 nmol/L   05/19/2025 7:55 PM CDT [facility]     Comment:  INTERPRETIVE INFORMATION: Vitamin B1, Whole Blood  This assay measures the concentration of thiamine diphosphate (TDP),  the  primary active form of vitamin B1. Approximately 90 percent of vitamin  B1  present in whole blood is TDP. Thiamine and thiamine monophosphate,  which  comprise the remaining 10 percent, are not measured.  This test was developed and its performance characteristics determined  by  [employer]. It has not been cleared or approved by the [place] Food  and  Drug Administration. This test was performed in a CLIA certified  laboratory  and is intended for clinical purposes.  Performed By: [facility]  [address] [place] [id number]  : Steve Daugherty MD, PhD  CLIA Number: [id number]       Lab Results - (ABNORMAL) Vitamin B1, Whole Blood (05/15/2025 12:02 PM CDT)  Specimen (Source) Anatomical  Location / Laterality Collection Method / Volume Collection Time Received Time     BLOOD SPECIMEN / Unknown   05/15/2025 12:02 PM CDT 05/15/2025 12:24 PM CDT     Lab Results - (ABNORMAL) Vitamin B1, Whole Blood (05/15/2025 12:02 PM CDT)  Narrative         Lab Results - (ABNORMAL) Vitamin B1, Whole Blood (05/15/2025 12:02 PM CDT)  Authorizing Provider Result Type Result Status   Keyona Burton APRN - CNP CHEMISTRY ORDERABLES Final Result     Lab Results - (ABNORMAL) Vitamin B1, Whole Blood (05/15/2025 12:02 PM CDT)  Performing Organization Address City/State/ZIP Code Phone Number   [facility]  [address] [place] [address], [facility]  [phone]    [place] [address], [facility]  [phone]       Back to top of Lab Results       Vitamin B12 (05/15/2025 12:02 PM CDT)  Lab Results - Vitamin B12 (05/15/2025 12:02 PM CDT)  Component Value Ref Range Test Method Analysis Time Performed At [facility]   Vitamin B-12 533 232 - 1245 pg/mL   05/15/2025 12:01 PM CDT [facility]       Lab Results - Vitamin B12 (05/15/2025 12:02 PM CDT)  Specimen (Source) Anatomical Location / Laterality Collection Method / Volume Collection Time Received Time   Blood BLOOD SPECIMEN / Unknown   05/15/2025 12:02 PM CDT 05/15/2025 12:16 PM CDT     Lab Results - Vitamin B12 (05/15/2025 12:02 PM CDT)  Narrative         Lab Results - Vitamin B12 (05/15/2025 12:02 PM CDT)  Authorizing Provider Result Type Result Status   Keyona Bruton APRN - CNP CHEMISTRY ORDERABLES Final Result     Lab Results - Vitamin B12 (05/15/2025 12:02 PM CDT)  Performing Organization Address City/State/ZIP Code Phone Number   [facility]  [address] [place] [place] [address], [facility]  [phone]       Back to top of Lab Results       Vitamin D 25 Hydroxy (05/15/2025 12:02 PM CDT)  Lab Results - Vitamin D 25 Hydroxy (05/15/2025 12:02 PM CDT)  Component Value Ref Range Test  Method Analysis Time Performed At Pathologist [facility]   Vit D, 25-Hydroxy 63.6 >=30 ng/mL   05/15/2025 12:02 PM CDT [facility]     Comment:  <=20 ng/mL.............Deficient  21-29 ng/mL...........Insufficient  >=30 ng/mL..........Sufficient       Lab Results - Vitamin D 25 Hydroxy (05/15/2025 12:02 PM CDT)  Specimen (Source) Anatomical Location / Laterality Collection Method / Volume Collection Time Received Time   Blood BLOOD SPECIMEN / Unknown   05/15/2025 12:02 PM CDT 05/15/2025 12:16 PM CDT     Lab Results - Vitamin D 25 Hydroxy (05/15/2025 12:02 PM CDT)  Narrative         Lab Results - Vitamin D 25 Hydroxy (05/15/2025 12:02 PM CDT)  Authorizing Provider Result Type Result Status   Keyona Burton APRN - CNP CHEMISTRY ORDERABLES Final Result     Lab Results - Vitamin D 25 Hydroxy (05/15/2025 12:02 PM CDT)  Performing Organization Address City/State/ZIP Code Phone Number   [facility]  [address] [place] [address], [facility]  [phone]       Back to top of Lab Results       Methylmalonic Acid, Serum (05/15/2025 12:02 PM CDT)  Lab Results - Methylmalonic Acid, Serum (05/15/2025 12:02 PM CDT)  Component Value Ref Range Test Method Analysis Time Performed At Pathologist [facility]   Methylmalonic Acid 0.13 0.00 - 0.40 umol/L   05/19/2025 3:48 AM CDT [facility]     Comment:  INTERPRETIVE INFORMATION: MMA Serum/Plasma,                           Vitamin B12 Status  This test was developed and its performance characteristics determined  by  "[employer] It has not been cleared or approved by the [place] Food  and  Drug Administration. This test was performed in a CLIA certified  laboratory  and is intended for clinical purposes.  Performed By: "[employer]"  [address] [place] [address]  : Steve Daugherty MD, PhD  CLIA Number: [id number]       Lab Results - Methylmalonic Acid, Serum (05/15/2025 12:02 PM CDT)  Specimen  "(Source) Anatomical Location / Laterality Collection Method / Volume Collection Time Received Time     BLOOD SPECIMEN / Unknown   05/15/2025 12:02 PM CDT 05/15/2025 12:16 PM CDT     Lab Results - Methylmalonic Acid, Serum (05/15/2025 12:02 PM CDT)  Narrative         Lab Results - Methylmalonic Acid, Serum (05/15/2025 12:02 PM CDT)  Authorizing Provider Result Type Result Status   Keyona Burton APRN - CNP CHEMISTRY ORDERABLES Final Result     Lab Results - Methylmalonic Acid, Serum (05/15/2025 12:02 PM CDT)  Performing Organization Address City/State/ZIP Code Phone Number   [facility]  [address] [place] [address], [facility]  [phone]    [facility]  [address] [place] [address], [facility]  [phone]       Back to top of Lab Results       Heavy metals screen (05/15/2025 12:02 PM CDT)  Lab Results - Heavy metals screen (05/15/2025 12:02 PM CDT)  Component Value Ref Range Test Method Analysis Time Performed At [facility]   Lead <2.0 <=4.9 ug/dL   05/18/2025 5:11 AM CDT [facility]     Comment:  INTERPRETIVE INFORMATION: Lead, Blood (Venous)  Analysis performed by Inductively Coupled Plasma-Mass Spectrometry  (ICP-MS).  Elevated results may be due to skin or collection-related  contamination,  including the use of a noncertified lead-free tube. If contamination  concerns  exist due to elevated levels of blood lead, confirmation with a second  specimen collected in a certified lead-free tube is recommended.  Information sources for blood lead reference intervals and interpretive  comments include the CDC's \"Childhood Lead Poisoning Prevention:  Recommended  Actions Based on Blood Lead Level\" and the \"Adult Blood Lead  Epidemiology and  Surveillance: Reference Blood Lead Levels (BLLs) for Adults in the  [place].\"  Thresholds and time intervals for retesting, medical evaluation, and  response  vary by state and regulatory body. Contact your State Department of  Health  and/or " applicable regulatory agency for specific guidance on medical  management recommendations.  This test was developed and its performance characteristics determined  by  [employer]. It has not been cleared or approved by the [place]. Food  and  Drug Administration. This test was performed in a CLIA-certified  laboratory  and is intended for clinical purposes.  Group          Concentration   Comment  Children       3.5-19.9 ug/dL  Children under the age of 6                                years are the most vulnerable                                to the harmful effects of                                lead exposure. Environmental                                investigation and exposure                                history to identify potential                                sources of lead. Biological                                and nutritional monitoring                                are recommended. Follow-up                                blood lead monitoring is                                recommended.                20-44.9 ug/dL   Lead hazard reduction and                                prompt medical evaluation are                                recommended. Contact a                                Pediatric Environmental                                Health Specialty Unit or                                poison control center for                                guidance.                Greater than    Critical. Immediate medical                44.9 ug/dL      evaluation, including                                detailed neurological exam is                                recommended. Consider                                chelation therapy when                                symptoms of lead toxicity are                                present. Contact a Pediatric                                Environmental Health                                Specialty Unit or poison                                 control center for                                assistance.  Adult          5-19.9 ug/dL    Medical removal is                                recommended for pregnant                                women or those who are trying                                or may become pregnant.                                Adverse health effects are                                possible. Reduced lead                                exposure and increased blood                                lead monitoring are                                recommended.                  20-69.9 ug/dL   Adverse health effects are                                indicated. Medical removal                                from lead exposure is                                required by OSHA if blood                                lead level exceeds 50 ug/dL.                                Prompt medical evaluation is                                recommended.                Greater than    Critical. Immediate medical                69.9 ug/dL      evaluation is recommended.                                Consider chelation therapy                                when symptoms of lead                                toxicity are present.   Arsenic <10.0 <=12.0 ug/L   05/18/2025 5:11 AM CDT [facility]     Comment:  INTERPRETIVE INFORMATION: Arsenic, Blood  Elevated results may be due to skin or collection-related  contamination,  including the use of a noncertified metal-free collection/transport  tube. If  contamination concerns exist due to elevated levels of blood arsenic,  confirmation with a second specimen collected in a certified metal-free  tube  is recommended.  Potentially toxic ranges for blood arsenic: Greater than or equal to  600 ug/L.  Blood arsenic is for the detection of recent exposure poisoning only.  Blood  arsenic levels in healthy subjects vary considerably with exposure to  arsenic  in the diet and the environment.  A 24-hour urine arsenic is useful for  the  detection of chronic exposure.  This test was developed and its performance characteristics determined  by  [employer]. It has not been cleared or approved by the [place] Food  and  Drug Administration. This test was performed in a CLIA certified  laboratory  and is intended for clinical purposes.     Mercury <2.5 <=10.0 ug/L   05/18/2025 5:11 AM CDT [facility] LABORATORY     Comment:  INTERPRETIVE INFORMATION: Mercury, Blood  Elevated results may be due to skin or collection-related  contamination,  including the use of a noncertified metal-free collection/transport  tube. If  contamination concerns exist due to elevated levels of blood mercury,  confirmation with a second specimen collected in a certified metal-free  tube  is recommended.  Blood mercury levels predominantly reflect recent exposure and are most  useful in the diagnosis of acute poisoning as blood mercury  concentrations  rise sharply and fall quickly over several days after ingestion. Blood  concentrations in unexposed individuals rarely exceed 20 ug/L. The  provided  reference interval relates to inorganic mercury concentrations. Dietary  and  non-occupational exposure to organic mercury forms may contribute to an  elevated total mercury result. Clinical presentation after toxic  exposure to  organic mercury may include dysarthria, ataxia and constricted vision  fields  with mercury blood concentrations from 20 to 50 ug/L.  This test was developed and its performance characteristics determined  by  [employer]. It has not been cleared or approved by the [place] Food  and  Drug Administration. This test was performed in a CLIA certified  laboratory  and is intended for clinical purposes.  Performed By: [employer]  [address] [place] [address]  : Steve Daugherty MD, PhD  CLIA Number: [id number]   Cadmium <1.0 <=5.0 ug/L   05/18/2025 5:11 AM CDT [facility]      Comment:  INTERPRETATION INFORMATION: Cadmium, Blood  Elevated results may be due to skin or collection-related  contamination,  including the use of a noncertified metal-free collection/transport  tube. If  contamination concerns exist due to elevated levels of blood cadmium,  confirmation with a second specimen collected in a certified metal-free  tube  is recommended.  Blood cadmium levels can be used to monitor acute toxicity and in  combination  with cadmium urine and B-2 microglobulin is the preferred method for  monitoring occupational exposure. Symptoms associated with cadmium  toxicity  vary based upon route of exposure and may include tubular proteinuria,  fever,  headache, dyspnea, chest pain, conjunctivitis, rhinitis, sore throat  and  cough. Ingestion of cadmium in high concentration may cause vomiting,  diarrhea, salivation, cramps, and abdominal pain.  This test was developed and its performance characteristics determined  by  [employer]. It has not been cleared or approved by the [place] Food  and  Drug Administration. This test was performed in a CLIA certified  laboratory  and is intended for clinical purposes.     Lab Results - Heavy metals screen (05/15/2025 12:02 PM CDT)  Specimen (Source) Anatomical Location / Laterality Collection Method / Volume Collection Time Received Time   Blood BLOOD SPECIMEN / Unknown   05/15/2025 12:02 PM CDT 05/15/2025 12:16 PM CDT     Lab Results - Heavy metals screen (05/15/2025 12:02 PM CDT)  Narrative         Lab Results - Heavy metals screen (05/15/2025 12:02 PM CDT)  Authorizing Provider Result Type Result Status   Keyona STEELE - CNP CHEMISTRY ORDERABLES Final Result     Lab Results - Heavy metals screen (05/15/2025 12:02 PM CDT)  Performing Organization Address City/State/ZIP Code Phone Number   [facility] LAB  [address] [place], [place] [address]  [phone]    [facility] LABORATORY  [address] [place] [address]   [phone]       Back to top of Lab Results       (ABNORMAL) C-Reactive Protein (05/15/2025 12:02 PM CDT)  Lab Results - (ABNORMAL) C-Reactive Protein (05/15/2025 12:02 PM CDT)  Component Value Ref Range Test Method Analysis Time Performed At Pathologist Signature   CRP 17.0 (H) 0.0 - 5.0 mg/L   05/15/2025 11:49 AM CDT [facility] LAB     Comment:  WR-CRP Reference range:  30D-199Y    <5.0  <30D        Not established    CRP is used in the detection and evaluation of infection,  tissue injury, inflammatory disorders, and associated  disease.  Increases in CRP values are non-specific and  should not be interpreted without a complete clinical  evaluation.   reference ranges have not been  established and values should be interpreted within clinical  context and with serial measurements, if clinically  appropriate.       Lab Results - (ABNORMAL) C-Reactive Protein (05/15/2025 12:02 PM CDT)  Specimen (Source) Anatomical Location / Laterality Collection Method / Volume Collection Time Received Time   Blood BLOOD SPECIMEN / Unknown   05/15/2025 12:02 PM CDT 05/15/2025 12:16 PM CDT     Lab Results - (ABNORMAL) C-Reactive Protein (05/15/2025 12:02 PM CDT)  Narrative         Lab Results - (ABNORMAL) C-Reactive Protein (05/15/2025 12:02 PM CDT)  Authorizing Provider Result Type Result Status   Keyona Burton APRN - CNP CHEMISTRY ORDERABLES Final Result     Lab Results - (ABNORMAL) C-Reactive Protein (05/15/2025 12:02 PM CDT)  Performing Organization Address City/State/ZIP Code Phone Number   [facility] LAB  [address] [place] [address], [facility]  [phone]       Back to top of Lab Results       (ABNORMAL) Comprehensive Metabolic Panel (05/15/2025 12:02 PM CDT)  Lab Results - (ABNORMAL) Comprehensive Metabolic Panel (05/15/2025 12:02 PM CDT)  Component Value Ref Range Test Method Analysis Time Performed At [facility]   Sodium 138 136 - 145 mmol/L   05/15/2025  11:36 AM [facility] LAB     Potassium 3.9 3.5 - 5.1 mmol/L   05/15/2025 11:36 AM [facility] LAB     Chloride 102 98 - 107 mmol/L   05/15/2025 11:36 AM [facility] LAB     CO2 24 22 - 29 mmol/L   05/15/2025 11:49 AM [facility] LAB     Anion Gap 12 8 - 16 mmol/L   05/15/2025 1:04 PM [facility] LAB     Glucose 150 (H) 70 - 99 mg/dL   05/15/2025 11:49 AM [facility] LAB     BUN 23 (H) 6 - 20 mg/dL   05/15/2025 11:49 AM [facility] LAB     Creatinine 0.7 0.5 - 0.9 mg/dL   05/15/2025 11:49 AM [facility] LAB     Est, Glom Filt Rate >90 >60   05/15/2025 11:49 AM [facility] LAB     Comment:  Pediatric calculator link  https://www.kidney.org/professionals/kdoqi/gfr_calculatorped  Effective Oct 3, 2022  These results are not intended for use in patients  <18 years of age.  eGFR results are calculated without  a race factor using the 2021 CKD-EPI equation.  Careful  clinical correlation is recommended, particularly when  comparing to results calculated using previous equations.  The CKD-EPI equation is less accurate in patients with  extremes of muscle mass, extra-renal metabolism of  creatinine, excessive creatinine ingestion, or following  therapy that affects renal tubular secretion.     Calcium 9.4 8.6 - 10.0 mg/dL   05/15/2025 11:49 AM [facility] LAB     Total Protein 7.1 6.4 - 8.3 g/dL   05/15/2025 11:49 AM [facility] LAB     Albumin 4.2 3.5 - 5.2 g/dL   05/15/2025 11:49 AM [facility] LAB     Total Bilirubin 0.2 0.2 - 1.2 mg/dL   05/15/2025 11:49 AM [facility] LAB     Alkaline Phosphatase 76 35 - 104 U/L   05/15/2025 11:49 AM [facility] LAB     ALT 17 10 - 35 U/L   05/15/2025 11:49 AM [facility] LAB     AST 13 10 - 35 U/L   05/15/2025 11:49 AM CDT [facility] LAB       Lab Results - (ABNORMAL) Comprehensive Metabolic Panel (05/15/2025 12:02 PM CDT)  Specimen (Source) Anatomical Location / Laterality Collection Method / Volume Collection Time Received Time   Blood BLOOD SPECIMEN / Unknown   05/15/2025 12:02 PM CDT 05/15/2025 12:16 PM CDT     Lab Results - (ABNORMAL) Comprehensive Metabolic Panel (05/15/2025 12:02 PM CDT)  Narrative         Lab Results - (ABNORMAL) Comprehensive Metabolic Panel (05/15/2025 12:02 PM CDT)  Authorizing Provider Result Type Result Status   Keyona Burton APRN - CNP CHEMISTRY ORDERABLES Final Result     Lab Results - (ABNORMAL) Comprehensive Metabolic Panel (05/15/2025 12:02 PM CDT)  Performing Organization Address City/State/ZIP Code Phone Number   [facility] LAB  [address]  [phone]       Back to top of Lab Results       (ABNORMAL) CBC with Auto Differential (05/15/2025 12:02 PM CDT)  Lab Results - (ABNORMAL) CBC with Auto Differential (05/15/2025 12:02 PM CDT)  Component Value Ref Range Test Method Analysis Time Performed At Pathologist Signature   WBC 13.3 (H) 4.8 - 10.8 K/uL   05/15/2025 12:36 PM CDT [facility] LAB     RBC 5.05 4.20 - 5.40 M/uL   05/15/2025 12:36 PM T [facility] LAB     Hemoglobin 13.4 12.0 - 16.0 g/dL   05/15/2025 12:36 PM T [facility] LAB     Hematocrit 42.4 37.0 - 47.0 %   05/15/2025 12:36 PM T [facility] LAB     MCV 84.0 81.0 - 99.0 fL   05/15/2025 12:36 PM T [facility] LAB     MCH 26.5 (L) 27.0 - 31.0 pg   05/15/2025 12:36 PM T [facility] LAB     MCHC 31.6 (L) 33.0 - 37.0 g/dL   05/15/2025 12:36 PM CDT [facility] LAB     RDW 16.2 (H) 11.5 - 14.5 %   05/15/2025 12:36 PM [facility] LAB     Platelets 199 130 - 400  K/uL   05/15/2025 12:36 PM [facility] LAB     MPV 12.9 (H) 9.4 - 12.3 fL   05/15/2025 12:36 PM [facility] LAB     Neutrophils % 83.3 (H) 50.0 - 65.0 %   05/15/2025 12:36 PM [facility] LAB     Lymphocytes % 10.8 (L) 20.0 - 40.0 %   05/15/2025 12:36 PM [facility] LAB     Monocytes % 4.8 0.0 - 10.0 %   05/15/2025 12:36 PM [facility] LAB     Eosinophils % 0.3 0.0 - 5.0 %   05/15/2025 12:36 PM [facility] LAB     Basophils % 0.2 0.0 - 1.0 %   05/15/2025 12:36 PM [facility] LAB     Neutrophils Absolute 11.1 (H) 1.5 - 7.5 K/uL   05/15/2025 12:36 PM [facility] LAB     Immature Granulocytes # 0.1 K/uL   05/15/2025 12:36 PM [facility] LAB     Lymphocytes Absolute 1.4 1.1 - 4.5 K/uL   05/15/2025 12:36 PM [facility] LAB     Monocytes Absolute 0.60 0.00 - 0.90 K/uL   05/15/2025 12:36 PM [facility] LAB     Eosinophils Absolute 0.00 0.00 - 0.60 K/uL   05/15/2025 12:36 PM [facility] LAB     Basophils Absolute 0.00 0.00 - 0.20 K/uL   05/15/2025 12:36 PM [facility] LAB       Lab Results - (ABNORMAL) CBC with Auto Differential (05/15/2025 12:02 PM CDT)  Specimen (Source) Anatomical Location / Laterality Collection Method / Volume Collection Time Received Time   Blood BLOOD SPECIMEN / Unknown   05/15/2025 12:02 PM CDT 05/15/2025 12:16 PM CDT     Lab Results - (ABNORMAL) CBC with Auto Differential (05/15/2025 12:02 PM CDT)  Narrative         Lab Results - (ABNORMAL) CBC with Auto Differential (05/15/2025 12:02 PM CDT)  Authorizing Provider Result Type Result Status   Keyona Burton APRN - CNP HEMATOLOGY ORDERABLES Final Result     Lab Results - (ABNORMAL) CBC with Auto Differential (05/15/2025 12:02 PM CDT)  Performing Organization Address  City/State/ZIP Code Phone Number   [facility] LAB  [address] [place] [address], [facility]  [phone]       Back to top of Lab Results       B. Burgdorferi Antibodies by WB (05/15/2025 12:02 PM CDT)  Lab Results - B. Burgdorferi Antibodies by WB (05/15/2025 12:02 PM CDT)  Component Value Ref Range Test Method Analysis Time Performed At Pathologist [facility]   Borrelia burgdorferi Ab IgG Negative Negative   05/18/2025 3:55 PM CDT [facility] LABORATORY     Comment:  Band(s) present: NONE  (Insufficient number of bands for positive result)  INTERPRETIVE INFORMATION: B. burgdorferi IgG Immunoblot  For this assay, a positive result is reported when any 5 or more of the  following 10 bands are present: 18, 23, 28, 30, 39, 41, 45, 58, 66, or  93  kDa.  All other banding patterns are reported as negative.     Lyme Disease Ab, Quant, IgM Negative Negative   05/18/2025 3:55 PM CDT [facility] LABORATORY     Comment:  Band(s) present: NONE  (Insufficient number of bands for positive result)  INTERPRETIVE INFORMATION: B. burgdorferi Antibody IgM                           Immunoblot  For this assay, a positive result is reported when any 2 or more of the  following bands are present: 23, 39, or 41 kDa.  All other banding  patterns  are reported as negative.  Performed By: [employer]  [address] [place] [id number]  : Steve Daugherty MD, PhD  CLIA Number: [id number]       Lab Results - B. Burgdorferi Antibodies by WB (05/15/2025 12:02 PM CDT)  Specimen (Source) Anatomical Location / Laterality Collection Method / Volume Collection Time Received Time   Blood BLOOD SPECIMEN / Unknown   05/15/2025 12:02 PM CDT 05/15/2025 12:16 PM CDT     Lab Results - B. Burgdorferi Antibodies by WB (05/15/2025 12:02 PM CDT)  Narrative         Lab Results - B. Burgdorferi Antibodies by WB (05/15/2025 12:02 PM CDT)  Authorizing Provider Result Type Result Status   Keyona STEELE - CNP  IMMUNOLOGY ORDERABLES Final Result     Lab Results - B. Burgdorferi Antibodies by WB (05/15/2025 12:02 PM CDT)  Performing Organization Address City/State/ZIP Code Phone Number   [facility] LAB  [address] [address], [facility]  [phone]    [facility] LABORATORY  [address] [place] [address], [facility]  [phone]       Back to top of Lab Results       EDENILSON Screen with Reflex (05/15/2025 12:02 PM CDT)  Lab Results - EDENILSON Screen with Reflex (05/15/2025 12:02 PM CDT)  Component Value Ref Range Test Method Analysis Time Performed At Pathologist Signature   EDENILSON Ab, IgG SAMMIE None Detected None Detected   05/17/2025 10:41 PM CDT [facility] LABORATORY     Comment:  No Anti-Nuclear Antibodies (EDENILSON) detected by SAMMIE. The Extractable  Nuclear  Antigen Antibodies (RNP, Smith, SSA 52, SSA 60, Scleroderma, Georgia-1 and  SSB)  and Double Stranded DNA (dsDNA) Antibody, IgG will not be performed.  If suspicion of connective tissue disease is strong, and EDENILSON is  negative by  SAMMIE, consider testing for EDENILSON by IFA ([id number]).  INTERPRETIVE INFORMATION: Anti-Nuclear Antibodies (EDENILSON), IgG by SAMMIE  Antinuclear Antibodies (EDENILSON), IgG by SAMMIE: EDENILSON specimens are screened  using  enzyme-linked immunosorbent assay (SAMMIE) methodology. All SAMMIE  results  reported as Detected are further tested by indirect fluorescent assay  (IFA)  using HEp-2 substrate with an IgG-specific conjugate. The EDENILSON SAMMIE  screen is  designed to detect antibodies against dsDNA, histones, SS-A (Ro), SS-B  (La),  Poon, Poon/RNP, Scl-70, Georgia-1, centromeric proteins, other antigens  extracted from the HEp-2 cell nucleus. EDENILSON SAMMIE assays have been  reported to  have lower sensitivities than EDENILSON IFA for systemic autoimmune rheumatic  diseases (SARD).  Negative results do not necessarily rule out SARD.  Performed By: [employer] [facility]  [address] [id number]  : Steve Daugherty MD, PhD  CLIA Number: [id number]     Lab  Results - EDENILSON Screen with Reflex (05/15/2025 12:02 PM CDT)  Specimen (Source) Anatomical Location / Laterality Collection Method / Volume Collection Time Received Time     BLOOD SPECIMEN / Unknown   05/15/2025 12:02 PM CDT 05/15/2025 12:16 PM CDT     Lab Results - EDENILSON Screen with Reflex (05/15/2025 12:02 PM CDT)  Narrative         Lab Results - EDENILSON Screen with Reflex (05/15/2025 12:02 PM CDT)  Authorizing Provider Result Type Result Status   Keyona Burton APRN - CNP CHEMISTRY ORDERABLES Final Result     Lab Results - EDENILSON Screen with Reflex (05/15/2025 12:02 PM CDT)  Performing Organization Address City/State/ZIP Code Phone Number   [facility] LAB  [address] [place], [place] [address]  [phone]    [facility]  [address] [place], [place] [address]  [phone]       Back to top of Lab Results  Imaging Results  - documented in this encounter   Table of Contents for Imaging Results   FL LUMBAR PUNCTURE DIAG (05/22/2025 3:04 PM CDT)   MRV HEAD WO CONTRAST (05/19/2025 3:01 PM CDT)      FL LUMBAR PUNCTURE DIAG (05/22/2025 3:04 PM CDT)  Imaging Results - FL LUMBAR PUNCTURE DIAG (05/22/2025 3:04 PM CDT)  Anatomical Region Laterality Modality       Radio Fluoroscopy     Imaging Results - FL LUMBAR PUNCTURE DIAG (05/22/2025 3:04 PM CDT)  Specimen (Source) Anatomical Location / Laterality Collection Method / Volume Collection Time Received Time                 Imaging Results - FL LUMBAR PUNCTURE DIAG (05/22/2025 3:04 PM CDT)  Impressions   05/22/2025 3:20 PM CDT   Satisfactory fluoroscopic guided lumbar puncture.      ______________________________________  Electronically signed by: LACI PRECIADO M.D.  Date:     05/22/2025  Time:    15:15       Imaging Results - FL LUMBAR PUNCTURE DIAG (05/22/2025 3:04 PM CDT)  Narrative   05/22/2025 3:20 PM CDT   EXAM: FLUOROSCOPIC GUIDED LUMBAR PUNCTURE    HISTORY: Papilledema, memory loss    Fluoroscopy time 17 seconds    Radiation dose 0.168 mGy meter  square    COMPARISON: None.    FINDINGS: Time out 1445 hours.    Written and verbal informed consent was obtained. Complications of bleeding, infection and headache were discussed.    The patient was placed prone on the fluoroscopy table. Fluoroscopy was performed and the optimal site for percutaneous approach was determined and the skin was marked.    The back was prepped and draped in a sterile manner.    5 ml of 1% lidocaine was injected for local anesthesia.    A 20 gauge spinal needle was advanced in the lumbar subarachnoid space using fluoroscopic guidance at the L4-L5 level.    The patient was rolled into the left lateral decubitus    The stylet was removed. Opening pressure was 10 cm.    Initially 2.5 ml of spinal fluid was collected.  Next 2.5 ml was collected into the special Azadi test container. This container was immediately placed on ice. Then three additional tubes for a total of 15 cc of clear cerebrospinal fluid was  collected and sent to the laboratory.    The stylet was replaced and the needle was removed.    There are no immediate complaints or complications.        Imaging Results - FL LUMBAR PUNCTURE DIAG (05/22/2025 3:04 PM CDT)  Procedure Note   Bennett Hernandez MD - 05/22/2025   Formatting of this note might be different from the original.  EXAM: FLUOROSCOPIC GUIDED LUMBAR PUNCTURE    HISTORY: Papilledema, memory loss    Fluoroscopy time 17 seconds    Radiation dose 0.168 mGy meter square    COMPARISON: None.    FINDINGS: Time out 1445 hours.    Written and verbal informed consent was obtained. Complications of bleeding, infection and headache were discussed.    The patient was placed prone on the fluoroscopy table. Fluoroscopy was performed and the optimal site for percutaneous approach was determined and the skin was marked.    The back was prepped and draped in a sterile manner.    5 ml of 1% lidocaine was injected for local anesthesia.    A 20 gauge spinal needle was advanced in the  lumbar subarachnoid space using fluoroscopic guidance at the L4-L5 level.    The patient was rolled into the left lateral decubitus    The stylet was removed. Opening pressure was 10 cm.    Initially 2.5 ml of spinal fluid was collected. Next 2.5 ml was collected into the special Alzheimer test container. This container was immediately placed on ice. Then three additional tubes for a total of 15 cc of clear cerebrospinal fluid was  collected and sent to the laboratory.    The stylet was replaced and the needle was removed.    There are no immediate complaints or complications.    IMPRESSION:  Satisfactory fluoroscopic guided lumbar puncture.      ______________________________________  Electronically signed by: LACI PRECIADO M.D.  Date: 05/22/2025  Time: 15:15     Imaging Results - FL LUMBAR PUNCTURE DIAG (05/22/2025 3:04 PM CDT)  Authorizing Provider Result Type Result Status   Keyona STEELE - CNP IM FLUOROSCOPY ORDERABLES Final Result      Back to top of Imaging Results       MRV HEAD WO CONTRAST (05/19/2025 3:01 PM CDT)  Imaging Results - MRV HEAD WO CONTRAST (05/19/2025 3:01 PM CDT)  Anatomical Region Laterality Modality       Magnetic Resonance     Imaging Results - MRV HEAD WO CONTRAST (05/19/2025 3:01 PM CDT)  Specimen (Source) Anatomical Location / Laterality Collection Method / Volume Collection Time Received Time                 Imaging Results - MRV HEAD WO CONTRAST (05/19/2025 3:01 PM CDT)  Impressions   05/20/2025 8:26 AM CDT   Impression:  Normal MRV head.      ______________________________________  Electronically signed by: BERT WILLS D.O.  Date:     05/20/2025  Time:    08:24       Imaging Results - MRV HEAD WO CONTRAST (05/19/2025 3:01 PM CDT)  Narrative   05/20/2025 8:26 AM CDT   EXAMINATION: MRI VENOGRAM BRAIN WITHOUT IV CONTRAST.    History: Memory loss, papilledema    Technique: MRI venogram of the brain without IV contrast. 3D/MIP/VR images were provided.    Comparison: Brain MRI  2025    Findings:  Dural venous sinsuses and major cortical/deep veins are patent. Cavernous sinuses are clear. Ventricles appropriate in size. No mass lesion or midline shift.        Imaging Results - MRV HEAD WO CONTRAST (2025 3:01 PM CDT)  Procedure Note   Bert Castillo DO - 2025   Formatting of this note might be different from the original.  EXAMINATION: MRI VENOGRAM BRAIN WITHOUT IV CONTRAST.    History: Memory loss, papilledema    Technique: MRI venogram of the brain without IV contrast. 3D/MIP/VR images were provided.    Comparison: Brain MRI 2025    Findings:  Dural venous sinsuses and major cortical/deep veins are patent. Cavernous sinuses are clear. Ventricles appropriate in size. No mass lesion or midline shift.    IMPRESSION:  Impression:  Normal MRV head.      ______________________________________  Electronically signed by: BERT CASTILLO D.O.  Date: 2025  Time: 08:24     Imaging Results - MRV HEAD WO CONTRAST (2025 3:01 PM CDT)  Authorizing Provider Result Type Result Status   Keyona Small CNP [facility] [place]ABLES Final Result        Progress Notes  - documented in this encounter   Table of Contents for Progress Notes   Keyona Burtno APRN - CNP - 2025 11:47 AM CDT   Natalia Solis - 2025 11:33 AM CDT      Keyona Burton APRN - CNP - 2025 11:47 AM CDT  Formatting of this note is different from the original.  Images from the original note were not included.      [facility]:    Patient: Lucila Kim  : 1970  Age: 54 y.o.  MRN: [id number]  Today: 5/15/25    Provider: IVETTE Duque    Chief Complaint:  Chief Complaint  Patient presents with  Follow-up  Papilledema  Pt states no improvement, at times she feels it's worse. She states still having headaches.  Memory Loss  Pt states memory is still the same.  Results  MRV and LP    History of Present Illness  Lucila Kim is a 54 y.o. year old female here  for follow-up of headaches and papilledema. She is about the same overall, headaches waxing and waning. She has seen ophthalmology again and reports that swelling has significantly decreased and is virtually resolved. Will request these records. She has neuro-ophthalmology follow-up Letty 10. At prior visit lumbar puncture was completed with normal opening pressure, CSF studies with no clear etiology for papilledema. CSF labs were possibly skewed by high amount of RBCs, there were several mild abnormalities but nothing that correlated into a clear clinical picture. MRV normal. Blood work unremarkable. Prior MRI brain unremarkable.    As previously discussed her initial symptoms or changes to vision. She initially perceived a smudge on her glasses on 03/23/2025, which she later realized was a visual disturbance in her eye. An immediate consultation with her optometrist led to a suspicion of macular nerve swelling, prompting a referral to an ophthalmologist. The ophthalmologist identified the issue as optic nerve swelling and recommended an MRI, followed by a consultation with a neuro-ophthalmologist. The neuro-ophthalmologist suggested a spinal tap. Concurrently, she began experiencing headaches, which later also affected her right eye on 04/21/2025. Despite being prescribed a high dose of steroids by Dr. Fabian and an antibiotic by the ophthalmologist, she reports no improvement in her symptoms. Blood work was ordered by Dr. Fabian, but she is unsure of the results.    She experiences daily headaches, accompanied by neck pain, frontal and lateral head pain, and temple tenderness. She also reports photophobia but no vomiting. She has no prior history of headaches or head injuries. She reports no specific triggers or alleviating factors for her headaches. She experiences dizziness when bending down or lying flat but reports no Valsalva maneuver-related symptoms. She reports no family history of aneurysms or brain  bleeds. She also reports memory issues, such as forgetting how to cook a dish she has prepared multiple times, and difficulty tolerating heat. She continues to experience vision changes in both eyes.    PAST MEDICAL HISTORY: She is a type 2 diabetic with generally well-controlled blood sugar levels, however she was on steroids recently. Her last A1c was 9.4.    SOCIAL HISTORY  Occupation: Disabled    FAMILY HISTORY  - Negative for aneurysms or brain bleeds.    PAST MEDICAL HISTORY:    Medical History:    Diagnosis Date  Anxiety  Type 2 diabetes mellitus without complication (HCC)    Surgical History:    Procedure Laterality Date   SECTION   GASTRIC BAND   HYSTERECTOMY, TOTAL ABDOMINAL (CERVIX REMOVED)  Still has tubes  SHOULDER SURGERY Left   rotator cuff repair    Current Medications:  Current Outpatient Medications  Medication Sig Dispense Refill  amitriptyline (ELAVIL) 10 MG tablet Take 1 tablet by mouth nightly 90 tablet 0  cyanocobalamin 1000 MCG/ML injection INJECT 1 ML (1,000 MCG) INTO THE MUSCLE EVERY 30 DAYS  MOUNJARO 15 MG/0.5ML SOAJ pen  sertraline (ZOLOFT) 100 MG tablet Take 1 tablet by mouth nightly  rosuvastatin (CRESTOR) 10 MG tablet Take 1 tablet by mouth nightly  insulin lispro, 1 Unit Dial, (HUMALOG/ADMELOG) 100 UNIT/ML SOPN Indications: Type 2 Diabetes Up to 20 units qac TID as follows : 1unit for every 5 grams of carbohydrate you consume plus 1 unit per 20 mg per dl above 140  meloxicam (MOBIC) 7.5 MG tablet Take 1 tablet by mouth daily  ALPRAZolam (XANAX) 1 MG tablet Take 1 tablet by mouth 2 times daily as needed.  Continuous Glucose Transmitter (DEXCOM G6 TRANSMITTER) MISC 1 EACH BY DOES NOT APPLY ROUTE FOLLOW INSTRUCTIONS PROVIDED BY YOUR PHYSICIAN  docusate sodium (COLACE) 100 MG capsule Take 1 capsule by mouth daily  SYNJARDY XR 5-1000 MG TB24 Take 2 tablets by mouth daily  enalapril (VASOTEC) 10 MG tablet Take 1 tablet by mouth daily  vitamin D (ERGOCALCIFEROL) 1.25 MG  (91643 UT) CAPS capsule TAKE ONE CAPSULE BY MOUTH EVERY WEEK  fluconazole (DIFLUCAN) 150 MG tablet TAKE 1 TABLET BY MOUTH ONE TIME PER WEEK  ibuprofen (ADVIL;MOTRIN) 800 MG tablet Take 1 tablet by mouth 3 times daily as needed  Insulin Disposable Pump (OMNIPOD 5 SLDW0H6 PODS GEN 5) MISC 1 EACH BY DOES NOT APPLY ROUTE EVERY 72 HOURS  lisinopril (PRINIVIL;ZESTRIL) 10 MG tablet Take 1 tablet by mouth nightly  metFORMIN (GLUCOPHAGE) 1000 MG tablet Take 1 tablet by mouth 2 times daily (with meals)  Insulin Glargine (TOUJEO SOLOSTAR SC) Inject 100 mg into the skin daily  bumetanide (BUMEX) 1 MG tablet Take 1 tablet by mouth as needed  omeprazole (PRILOSEC) 40 MG delayed release capsule Take 1 capsule by mouth daily  Melatonin 10 MG TABS Take 10 mg by mouth nightly  oxyCODONE-acetaminophen (PERCOCET)  MG per tablet Take 1 tablet by mouth every 8 hours as needed for Pain (Pain management- [facility]).  pregabalin (LYRICA) 75 MG capsule Take 1 capsule by mouth in the morning and 1 capsule in the evening.    No current facility-administered medications for this visit.    Allergies:  Onion, Tramadol, and Ultram [tramadol hcl]    SOCIAL HISTORY:  Social History    Socioeconomic History  Marital status: Single  Spouse name: Not on file  Number of children: Not on file  Years of education: Not on file  Highest education level: Not on file  Occupational History  Not on file  Tobacco Use  Smoking status: Never  Smokeless tobacco: Never  Vaping Use  Vaping status: Never Used  Substance and Sexual Activity  Alcohol use: Never  Drug use: Never  Sexual activity: Not on file  Other Topics Concern  Not on file  Social History Narrative  Not on file    Social Drivers of Health    Financial Resource Strain: Not on file  Food Insecurity: Not on file  Transportation Needs: Not on file  Physical Activity: Not on file  Stress: Not on file  Social Connections: Unknown (10/10/2023)  Received from [facility]  Family and [facility]  Support  Help with Day-to-Day Activities: Not on file  Lonely or Isolated: Not on file  Intimate Partner Violence: Unknown (5/25/2024)  Received from [facility]  Abuse Screen  Unsafe at Home or Work/School: Not on file  Feels Threatened by Someone?: Not on file  Does Anyone Keep You from Contacting Others or Doint Things Outside the Home?: Not on file  Physical Sign of Abuse Present: Not on file  Housing Stability: Unknown (10/10/2023)  [facility]  Housing Stability  Current Living Arrangements: Not on file  Potentially Unsafe Housing Conditions: Not on file    FAMILY HISTORY:    Problem Relation Age of Onset  Diabetes Father  Cancer Father  Lung cancer  Cancer Paternal Grandfather  unsure  Stomach Cancer Paternal Aunt  Colon Cancer Neg Hx  Colon Polyps Neg Hx  Esophageal Cancer Neg Hx  Liver Cancer Neg Hx    REVIEW OF SYSTEMS:  Constitutional: []Fever []Sweats []Chills [] Recent Injury [x] Denies all unless marked  HEENT:[]Headache [] Head Injury [] Hearing Loss [] Sore Throat [] Ear Ache [x] Denies all unless marked  Spine: [] Neck pain [] Back pain [] Sciaticia [x] Denies all unless marked  Cardiovascular:[]Heart Disease []Palpitations [] Chest Pain [x] Denies all unless marked  Pulmonary: []Shortness of Breath []Cough [x] Denies all unless marked  Psychiatric/Behavioral:[] Depression [] Anxiety [x] Denies all unless marked  Gastrointestinal: []Nausea []Vomiting []Abdominal Pain []Constipation []Diarrhea [x] Denies all unless marked  Genitourinary: [] Frequency [] Urgency [] Dysuria [] Incontinence [x] Denies all unless marked  Extremities: []Pain []Swelling [x] Denies all unless marked  Musculoskeletal: [] Myalgias [] Joint Pain [] Arthritis [] Muscle Cramps [] Muscle Twitches [x] Denies all unless marked  Sleep: []Insomnia[]Snoring []Restless Legs []Sleep Apnea []Daytime Sleepiness [x] Denies all unless marked  Skin:[] Rash [] Color Change [x] Denies all unless  "marked  Neurological:[]Visual Disturbance [] Memory Loss []Loss of Balance []Slurred Speech []Weakness []Seizures [] Dizziness [x] Denies all unless marked    The MA has completed the ROS with the patient. I have reviewed it in its' entirety with the patient and agree with the documentation.    PHYSICAL EXAMINATION:  Vitals: /68  Pulse 78  Ht 1.702 m (5' 7\")  Wt 114.3 kg (252 lb)  SpO2 98%  BMI 39.47 kg/m²  Constitutional - No acute distress  HEENT- Conjunctiva normal. No scars, masses, or lesions over external nose or ears, no neck masses noted, no jugular vein distension, no bruit  Cardiac- Regular rate and rhythm  Pulmonary- Clear to auscultation, good expansion, normal effort without use of accessory muscles  Musculoskeletal - No significant wasting of muscles noted, no bony deformities  Extremities - No clubbing, cyanosis or edema  Skin - Warm, dry, and intact. No rash, erythema, or pallor  Psychiatric - Mood, affect, and behavior appear normal    NEUROLOGIC EXAMINATION:    Mental status  [x]Awake, alert, oriented  [x]Affect attention and concentration appear appropriate  [x]Recent and remote memory appears unremarkable  [x]Speech normal without dysarthria or aphasia, comprehension and repetition intact.  COMMENTS:  Cranial Nerves [x]No VF deficit to confrontation  []PERRLA, EOMI, no nystagmus, conjugate eye movements, no ptosis  [x]Face symmetric  [x]Facial sensation intact  [x]Tongue midline no atrophy or fasciculations present  [x]Palate midline, hearing to finger rub normal bilaterally  [x]Shoulder shrug and SCM testing normal bilaterally  COMMENTS:  Motor [x]5/5 strength x 4 extremities  [x]Normal bulk and tone  [x]No tremor present  [x]No rigidity or bradykinesia noted  COMMENTS:  Sensory [x]Sensation intact to light touch, pin prick, vibration, and proprioception BLE  [x]Sensation intact to light touch, pin prick, vibration, and proprioception BUE  COMMENTS:  Coordination [x]FTN normal " bilaterally  []HTS normal bilaterally  [x]WILIAN normal bilaterally.  COMMENTS:  Reflexes [x]Symmetric and non-pathological  []Toes down going bilaterally  [x]No clonus present  COMMENTS:  Gait [x]Normal steady gait  []Ataxic  []Spastic  []Magnetic  []Shuffling  COMMENTS:    ADDITIONAL REVIEW:    Ophthalmology notes reviewed, right eye with 2+ disc edema, left eye with 3+ disc edema. Questioned possible diabetic papillitis, optic neuritis, or increased ICP. MRI brain completed 2025 with no acute pathology, minimal ,. MRI orbit with no mass, inflammatory changes, optic nerves unremarkable.    MRV 25  Impression:  Normal MRV head.      ______________________________________  Electronically signed by: BERT WILLS D.O.  Date: 2025  Time: 08:24    MRI Brain W WO 25  Impression    1. No intracranial mass lesion, pathologic enhancement or  hydrocephalus. Minimal chronic small vessel ischemic change. Otherwise  normal brain MRI. No strong evidence for underlying intracranial  pseudotumor in terms of MRI findings (transverse sinus stenosis, empty  sella, slitlike ventricles etc.). No dural sinus thrombus.  2. No orbital masses or inflammatory changes. Orbital fat signal is  normal. Optic nerves appear symmetric in caliber. Nerve signal appears  grossly normal, assessment slightly limited by motion artifact.    This report was signed and finalized on 2025 12:23 PM by Dr Mark Yee.    Reviewed records.    Assessment & Plan  Lucila LOPEZ Julio is a 54 y.o. female here today for follow-up of headaches and papilledema along with results review. Exam nonfocal.    1. Papilledema.  From prior visit has had additional follow-up with ophthalmology, reports papilledema has virtually resolved. Records will be requested for further review. No worsening of vision. She has had a normal MRI brain, MRV head. Blood work was unrevealing. Has completed LP with opening pressure of 10 which rules out IIH. CSF  studies with some mild abnormalities that are felt to be secondary to traumatic tap. RBCs greater than 800, glucose 128 (felt likely related to underlying hyperglycemia secondary to diabetes), several mild abnormalities to oligoclonal banding panel, however not consistent with MS, protein 53, beta globulin 10.5. CSF studies as a whole do not create a clear clinical picture or diagnosis. Discussed this today at length. Recommend second opinion from [place] [facility] due to unclear etiology of papilledema and continued follow-up with neuro-ophthalmology.    2. Headaches.  She reports daily headaches that are frontal and extend to the sides and back of her head, with associated pain in the back of her neck. The headaches are not alleviated by any specific actions and are accompanied by dizziness when her head is in a dependent position. There is no history of headaches or head injuries. Will plan to start Elavil 10 mg nightly to try to decrease frequency and severity of headaches. Not felt to be migrainous in nature.    3. Type II Diabetes Mellitus.  Her last A1c was 9.4, indicating poor control. Continue to work on diligent glucose control with primary care and endocrinology.    ICD-10-CM  1. Nonintractable headache, unspecified chronicity pattern, unspecified headache type R51.9 amitriptyline (ELAVIL) 10 MG tablet      PLAN:  Request records from ophthalmology, Dr. Barrios.  Follow-up with neuro-ophthalmology Letty 10 as scheduled.  Referral to [facility] for second opinion.  Start Elavil 10 mg nightly, after 1 month can titrate up to 20 mg nightly if tolerating well. Side effects discussed.  Fax notes to PCP, ophthalmology, neuro-ophthalmology once chart is completed.  Patient will require follow up to evaluate benefit of medication, side effects, and disease progression.  7. Return in about 2 months (around 7/29/2025) for Follow up, sooner with worsening symptoms.    IVETTE Duque - CNP    I spent 32 minutes  caring for Lucila Kim on this date of service. This time includes time spent by me in the following activities: preparing for the visit, reviewing tests, performing a medically appropriate examination and/or evaluation , counseling and educating the patient/family/caregiver, ordering medications, tests, or procedures, documenting information in the medical record and care coordination.    This dictation was generated by voice recognition computer software. Although all attempts are made to edit the dictation for accuracy, there may be errors in the transcription that are not intended.      Electronically signed by Keyona Burton APRN - CNP at 05/29/2025 5:02 PM EDT   Back to top of Progress Notes  Natalia Solis - 05/29/2025 11:33 AM CDT  Formatting of this note might be different from the original.  REVIEW OF SYSTEMS    Constitutional: []Fever []Sweats []Chills [] Recent Injury [x] Denies all unless marked  HEENT:[]Headache [] Head Injury [] Hearing Loss [] Sore Throat [] Ear Ache [x] Denies all unless marked  Spine: [] Neck pain [] Back pain [] Sciaticia [x] Denies all unless marked  Cardiovascular:[]Heart Disease []Palpitations [] Chest Pain [x] Denies all unless marked  Pulmonary: []Shortness of Breath []Cough [x] Denies all unless marked  Psychiatric/Behavioral:[] Depression [] Anxiety [x] Denies all unless marked  Gastrointestinal: []Nausea []Vomiting []Abdominal Pain []Constipation []Diarrhea [x] Denies all unless marked  Genitourinary: [] Frequency [] Urgency [] Dysuria [] Incontinence [x] Denies all unless marked  Extremities: []Pain []Swelling [x] Denies all unless marked  Musculoskeletal: [] Myalgias [] Joint Pain [] Arthritis [] Muscle Cramps [] Muscle Twitches [x] Denies all unless marked  Sleep: []Insomnia[]Snoring []Restless Legs []Sleep Apnea []Daytime Sleepiness [x] Denies all unless marked  Skin:[] Rash [] Color Change [x] Denies all unless marked  Neurological:[]Visual Disturbance []  Memory Loss []Loss of Balance []Slurred Speech []Weakness []Seizures [] Dizziness [x] Denies all unless marked      Electronically signed by Natalia Solis at 05/29/2025 5:02 PM EDT   Back to top of Progress Notes  Plan of Treatment  - documented as of this encounter   Plan of Treatment - Upcoming Encounters  Upcoming Encounters  Date Type Department Care Team (Latest Contact Info) Description   08/08/2025 10:30 AM CDT Office Visit [facility]   [address] [place] [id number]   [phone]  Keyona Burton, APRN - CNP   [address] [place] [address] [address]   [place] [address]   [phone] (Work)   [phone] (Fax)  Return in about 4 weeks (around 6/12/2025) for Follow up, sooner with worsening symptoms 30 min FU   10/13/2025 8:45 AM CDT Office Visit [facility]   [address] [place] [address], [place] [address]   [phone]  Keyona Burton, APRN - CNP   [address] [place] [id number]   [phone] (Work)   [phone] (Fax)                    Impression:  Lucila Kim is a 55 y.o. female who presents for evaluation of papilledema.  I have reviewed patient's records, her symptoms and workup do not indicate idiopathic cranial hypertension.  Review of her MRI records do not show any lesions within the brain or on the optic nerves.  Addition she has had negative CSF studies as well as negative NMO.  At this time the cause of her symptoms does not appear to be neurologic in nature.  Discussed with patient that I have no further options to offer for her.  She is set up to see neuro-ophthalmology again as well as seeking a second opinion through [facility].  Recommend continuing the care through them.    Diagnoses and all orders for this visit:    1. Changes in vision (Primary)        Plan:  No further workup recommended through our neurology clinic  Recommended continue following with the ophthalmology group here in  [place]  Recommend continuing following with neuro-ophthalmology  Recommend continuing with referral to [place]  Recommend to continue following with [facility] for continued care  Present to the emergency room for any new concerning symptoms  Follow-up with primary care as scheduled  No follow-up with our clinic indicated.    The patient and I have discussed the plan of care and she is in full agreement at this time.     Follow Up   Return if symptoms worsen or fail to improve.            Bonnie Negrete, IVETTE  06/09/25  16:09 CDT

## 2025-07-24 ENCOUNTER — TRANSCRIBE ORDERS (OUTPATIENT)
Dept: ADMINISTRATIVE | Facility: HOSPITAL | Age: 55
End: 2025-07-24
Payer: MEDICARE

## 2025-07-24 DIAGNOSIS — H47.019 ISCHEMIC OPTIC NEUROPATHY, UNSPECIFIED LATERALITY: Primary | ICD-10-CM

## 2025-07-29 ENCOUNTER — HOSPITAL ENCOUNTER (OUTPATIENT)
Dept: ULTRASOUND IMAGING | Facility: HOSPITAL | Age: 55
Discharge: HOME | End: 2025-07-29
Admitting: OPHTHALMOLOGY
Payer: MEDICARE

## 2025-07-29 DIAGNOSIS — H47.019 ISCHEMIC OPTIC NEUROPATHY, UNSPECIFIED LATERALITY: ICD-10-CM

## 2025-07-29 PROCEDURE — 93880 EXTRACRANIAL BILAT STUDY: CPT

## 2025-08-20 ENCOUNTER — PATIENT MESSAGE (OUTPATIENT)
Dept: NEUROLOGY | Age: 55
End: 2025-08-20

## (undated) DEVICE — SURGICAL SUCTION CONNECTING TUBE WITH MALE CONNECTOR AND SUCTION CLAMP, 2 FT. LONG (.6 M), 5 MM I.D.: Brand: CONMED

## (undated) DEVICE — BNDG ELAS CO-FLEX SLF ADHR 6IN 5YD LF STRL

## (undated) DEVICE — PENCL ES MEGADINE EZ/CLEAN BUTN W/HOLSTR 10FT

## (undated) DEVICE — PK SPINE CERV ANT 30

## (undated) DEVICE — TRAP FLD MINIVAC MEGADYNE 100ML

## (undated) DEVICE — DRAPE,UTILITY,TAPE,15X26,STERILE: Brand: MEDLINE

## (undated) DEVICE — 3M™ STERI-DRAPE™ INSTRUMENT POUCH 1018: Brand: STERI-DRAPE™

## (undated) DEVICE — PAD MINOR UNIVERSAL: Brand: MEDLINE INDUSTRIES, INC.

## (undated) DEVICE — THE PMT CERVICAL VISUALIZATION HARNESS STRETCHES TO CONFORM TO THE OUTER SHOULDER WHILE HOLDING TRACTION SECURELY.IT IS USED TO ACHIEVE SAFE, EFFECTIVE TRACTION ON THE SHOULDERS FOR INTRAOPERATIVE CERVICAL X-RAYS.: Brand: PMT CERVICAL HARNESS

## (undated) DEVICE — SYR LUERLOK 20CC BX/50

## (undated) DEVICE — SPNG GZ STRL 2S 4X4 12PLY

## (undated) DEVICE — COLR CERV VISTA TX 1SZ ADJ

## (undated) DEVICE — ELECTRD NDL EZ CLN MOD 2.75IN

## (undated) DEVICE — GLV SURG DERMASSURE GRN LF PF 8.0

## (undated) DEVICE — HALTR TRACT HD CERV STD UNIV

## (undated) DEVICE — NEEDLE, QUINCKE, 18GX3.5": Brand: MEDLINE

## (undated) DEVICE — 4-PORT MANIFOLD: Brand: NEPTUNE 2

## (undated) DEVICE — UTILITY MARKER W/MED LABELS: Brand: MEDLINE

## (undated) DEVICE — YANKAUER SUCTION INSTRUMENT WITHOUT CONTROL VENT, OPEN TIP, CLEAR: Brand: YANKAUER

## (undated) DEVICE — 3.0MM PRECISION NEURO (MATCH HEAD)

## (undated) DEVICE — GLV SURG SENSICARE W/ALOE PF LF 7.5 STRL

## (undated) DEVICE — BAPTIST TURNOVER KIT: Brand: MEDLINE INDUSTRIES, INC.

## (undated) DEVICE — GLV SURG BIOGEL LTX PF 6 1/2

## (undated) DEVICE — 3M™ IOBAN™ 2 ANTIMICROBIAL INCISE DRAPE 6650EZ: Brand: IOBAN™ 2

## (undated) DEVICE — TBG PENCL TELESCP MEGADYNE SMOKE EVAC 10FT

## (undated) DEVICE — PIN DISTRACT TI 14MM STRL

## (undated) DEVICE — PACK,UNIVERSAL,NO GOWNS: Brand: MEDLINE

## (undated) DEVICE — 1010 S-DRAPE TOWEL DRAPE 10/BX: Brand: STERI-DRAPE™

## (undated) DEVICE — ELECTRD BLD EZ CLN MOD XLNG 2.75IN

## (undated) DEVICE — CVR UNIV C/ARM

## (undated) DEVICE — GLV SURG BIOGEL LTX PF 7 1/2

## (undated) DEVICE — 3M™ STERI-STRIP™ REINFORCED ADHESIVE SKIN CLOSURES, R1547, 1/2 IN X 4 IN (12 MM X 100 MM), 6 STRIPS/ENVELOPE: Brand: 3M™ STERI-STRIP™

## (undated) DEVICE — SPNG DISSCT CHRRY 3/8IN STRL PK/5

## (undated) DEVICE — GLV SURG GRN DERMASSURE LF PF 7.5

## (undated) DEVICE — INTENDED FOR TISSUE SEPARATION, AND OTHER PROCEDURES THAT REQUIRE A SHARP SURGICAL BLADE TO PUNCTURE OR CUT.: Brand: BARD-PARKER ® STAINLESS STEEL BLADES

## (undated) DEVICE — APPL CHLORAPREP HI/LITE 26ML ORNG

## (undated) DEVICE — ADHS LIQ MASTISOL 2/3ML

## (undated) DEVICE — HDRST INTUB GENTLETOUCH SLOT 7IN RT

## (undated) DEVICE — TP SILK DURAPORE 3IN